# Patient Record
Sex: FEMALE | Race: WHITE | Employment: FULL TIME | URBAN - METROPOLITAN AREA
[De-identification: names, ages, dates, MRNs, and addresses within clinical notes are randomized per-mention and may not be internally consistent; named-entity substitution may affect disease eponyms.]

---

## 2017-01-20 ENCOUNTER — OFFICE VISIT (OUTPATIENT)
Dept: CARDIOLOGY CLINIC | Age: 62
End: 2017-01-20

## 2017-01-20 VITALS
HEART RATE: 60 BPM | SYSTOLIC BLOOD PRESSURE: 116 MMHG | WEIGHT: 169.4 LBS | DIASTOLIC BLOOD PRESSURE: 66 MMHG | BODY MASS INDEX: 34.21 KG/M2

## 2017-01-20 DIAGNOSIS — I10 ESSENTIAL HYPERTENSION, BENIGN: ICD-10-CM

## 2017-01-20 DIAGNOSIS — I42.9 CARDIOMYOPATHY (HCC): Primary | ICD-10-CM

## 2017-01-20 PROCEDURE — 99214 OFFICE O/P EST MOD 30 MIN: CPT | Performed by: INTERNAL MEDICINE

## 2017-07-06 RX ORDER — ATORVASTATIN CALCIUM 10 MG/1
TABLET, FILM COATED ORAL
Qty: 30 TABLET | Refills: 4 | Status: SHIPPED | OUTPATIENT
Start: 2017-07-06 | End: 2018-01-05 | Stop reason: SDUPTHER

## 2017-07-19 ENCOUNTER — OFFICE VISIT (OUTPATIENT)
Dept: CARDIOLOGY CLINIC | Age: 62
End: 2017-07-19

## 2017-07-19 VITALS
HEART RATE: 72 BPM | BODY MASS INDEX: 34.26 KG/M2 | SYSTOLIC BLOOD PRESSURE: 118 MMHG | WEIGHT: 169.6 LBS | DIASTOLIC BLOOD PRESSURE: 66 MMHG

## 2017-07-19 DIAGNOSIS — I73.9 CLAUDICATION (HCC): Primary | ICD-10-CM

## 2017-07-19 DIAGNOSIS — I42.8 NON-ISCHEMIC CARDIOMYOPATHY (HCC): ICD-10-CM

## 2017-07-19 DIAGNOSIS — R00.2 HEART PALPITATIONS: ICD-10-CM

## 2017-07-19 DIAGNOSIS — I10 ESSENTIAL HYPERTENSION, BENIGN: ICD-10-CM

## 2017-07-19 PROCEDURE — 99214 OFFICE O/P EST MOD 30 MIN: CPT | Performed by: NURSE PRACTITIONER

## 2017-07-28 ENCOUNTER — HOSPITAL ENCOUNTER (OUTPATIENT)
Dept: VASCULAR LAB | Age: 62
Discharge: HOME | End: 2017-07-28
Attending: NURSE PRACTITIONER | Admitting: NURSE PRACTITIONER

## 2017-07-28 DIAGNOSIS — I73.9 CLAUDICATION OF BOTH LOWER EXTREMITIES (HCC): Primary | ICD-10-CM

## 2017-08-02 ENCOUNTER — TELEPHONE (OUTPATIENT)
Dept: CARDIOLOGY CLINIC | Age: 62
End: 2017-08-02

## 2017-08-11 ENCOUNTER — TELEPHONE (OUTPATIENT)
Dept: CARDIOLOGY CLINIC | Age: 62
End: 2017-08-11

## 2017-10-21 ENCOUNTER — HOSPITAL ENCOUNTER (OUTPATIENT)
Dept: MAMMOGRAPHY | Age: 62
Discharge: HOME | End: 2017-10-21
Attending: FAMILY MEDICINE | Admitting: FAMILY MEDICINE

## 2017-10-21 DIAGNOSIS — Z12.31 VISIT FOR SCREENING MAMMOGRAM: ICD-10-CM

## 2017-11-16 ENCOUNTER — TELEPHONE (OUTPATIENT)
Dept: CARDIOLOGY CLINIC | Age: 62
End: 2017-11-16

## 2017-11-16 NOTE — TELEPHONE ENCOUNTER
Spoke to pt, she stated that she is having chest pain and feels nauseous. Pt denies SOB but does feel back pain. Instructed pt to go to the ER. Pt stated she would rather try OTC medication. Informed pt that OTC antiacid per Chayito Horton NP would not have an effect on her medications. Pt verbalized understanding on all instructions.

## 2018-01-05 RX ORDER — ATORVASTATIN CALCIUM 10 MG/1
TABLET, FILM COATED ORAL
Qty: 30 TABLET | Refills: 5 | Status: SHIPPED | OUTPATIENT
Start: 2018-01-05 | End: 2018-09-16 | Stop reason: SDUPTHER

## 2018-01-05 NOTE — TELEPHONE ENCOUNTER
Requested Prescriptions     Pending Prescriptions Disp Refills    atorvastatin (LIPITOR) 10 MG tablet [Pharmacy Med Name: ATORVASTATIN 10 MG TABLET] 30 tablet 5     Sig: TAKE ONE TABLET BY MOUTH DAILY         Last ov:7/19/17    Next ov:1/24/18    Last fill:7/6/17

## 2018-01-22 ENCOUNTER — TELEPHONE (OUTPATIENT)
Dept: CARDIOLOGY CLINIC | Age: 63
End: 2018-01-22

## 2018-01-25 ENCOUNTER — OFFICE VISIT (OUTPATIENT)
Dept: CARDIOLOGY CLINIC | Age: 63
End: 2018-01-25

## 2018-01-25 VITALS
SYSTOLIC BLOOD PRESSURE: 120 MMHG | DIASTOLIC BLOOD PRESSURE: 82 MMHG | WEIGHT: 169.4 LBS | HEART RATE: 56 BPM | BODY MASS INDEX: 34.21 KG/M2

## 2018-01-25 DIAGNOSIS — I73.9 CLAUDICATION OF BOTH LOWER EXTREMITIES (HCC): ICD-10-CM

## 2018-01-25 DIAGNOSIS — I10 ESSENTIAL HYPERTENSION, BENIGN: ICD-10-CM

## 2018-01-25 DIAGNOSIS — I42.8 NON-ISCHEMIC CARDIOMYOPATHY (HCC): Primary | ICD-10-CM

## 2018-01-25 PROCEDURE — 99214 OFFICE O/P EST MOD 30 MIN: CPT | Performed by: INTERNAL MEDICINE

## 2018-01-25 RX ORDER — PHENOL 1.4 %
1 AEROSOL, SPRAY (ML) MUCOUS MEMBRANE DAILY
COMMUNITY

## 2018-01-27 LAB
A/G RATIO: 1.9 (ref 1.1–2.2)
ALBUMIN SERPL-MCNC: 4.1 G/DL (ref 3.4–5)
ALP BLD-CCNC: 75 U/L (ref 40–129)
ALT SERPL-CCNC: 21 U/L (ref 10–40)
ANION GAP SERPL CALCULATED.3IONS-SCNC: 11 MMOL/L (ref 3–16)
AST SERPL-CCNC: 21 U/L (ref 15–37)
BASOPHILS ABSOLUTE: 0 K/UL (ref 0–0.2)
BASOPHILS RELATIVE PERCENT: 0.4 %
BILIRUB SERPL-MCNC: 0.7 MG/DL (ref 0–1)
BUN BLDV-MCNC: 23 MG/DL (ref 7–20)
CALCIUM SERPL-MCNC: 9.4 MG/DL (ref 8.3–10.6)
CHLORIDE BLD-SCNC: 100 MMOL/L (ref 99–110)
CHOLESTEROL, TOTAL: 162 MG/DL (ref 0–199)
CO2: 29 MMOL/L (ref 21–32)
CREAT SERPL-MCNC: 0.7 MG/DL (ref 0.6–1.2)
EOSINOPHILS ABSOLUTE: 0.1 K/UL (ref 0–0.6)
EOSINOPHILS RELATIVE PERCENT: 2 %
GFR AFRICAN AMERICAN: >60
GFR NON-AFRICAN AMERICAN: >60
GLOBULIN: 2.2 G/DL
GLUCOSE BLD-MCNC: 84 MG/DL (ref 70–99)
HCT VFR BLD CALC: 39.8 % (ref 36–48)
HDLC SERPL-MCNC: 62 MG/DL (ref 40–60)
HEMOGLOBIN: 13.7 G/DL (ref 12–16)
LDL CHOLESTEROL CALCULATED: 80 MG/DL
LYMPHOCYTES ABSOLUTE: 2 K/UL (ref 1–5.1)
LYMPHOCYTES RELATIVE PERCENT: 30.5 %
MCH RBC QN AUTO: 31.8 PG (ref 26–34)
MCHC RBC AUTO-ENTMCNC: 34.3 G/DL (ref 31–36)
MCV RBC AUTO: 92.7 FL (ref 80–100)
MONOCYTES ABSOLUTE: 0.5 K/UL (ref 0–1.3)
MONOCYTES RELATIVE PERCENT: 7.3 %
NEUTROPHILS ABSOLUTE: 3.8 K/UL (ref 1.7–7.7)
NEUTROPHILS RELATIVE PERCENT: 59.8 %
PDW BLD-RTO: 12.8 % (ref 12.4–15.4)
PLATELET # BLD: 196 K/UL (ref 135–450)
PMV BLD AUTO: 9.4 FL (ref 5–10.5)
POTASSIUM SERPL-SCNC: 4.5 MMOL/L (ref 3.5–5.1)
RBC # BLD: 4.3 M/UL (ref 4–5.2)
SODIUM BLD-SCNC: 140 MMOL/L (ref 136–145)
TOTAL PROTEIN: 6.3 G/DL (ref 6.4–8.2)
TRIGL SERPL-MCNC: 100 MG/DL (ref 0–150)
VLDLC SERPL CALC-MCNC: 20 MG/DL
WBC # BLD: 6.4 K/UL (ref 4–11)

## 2018-01-30 ENCOUNTER — TELEPHONE (OUTPATIENT)
Dept: CARDIOLOGY CLINIC | Age: 63
End: 2018-01-30

## 2018-02-01 ENCOUNTER — TELEPHONE (OUTPATIENT)
Dept: CARDIOLOGY CLINIC | Age: 63
End: 2018-02-01

## 2018-02-21 ENCOUNTER — HOSPITAL ENCOUNTER (OUTPATIENT)
Dept: NON INVASIVE DIAGNOSTICS | Age: 63
Discharge: HOME | End: 2018-01-31
Admitting: INTERNAL MEDICINE

## 2018-02-21 DIAGNOSIS — I42.8 OTHER CARDIOMYOPATHIES (CODE): ICD-10-CM

## 2018-02-21 DIAGNOSIS — I73.9 CLAUDICATION (HCC): Primary | ICD-10-CM

## 2018-03-08 ENCOUNTER — TELEPHONE (OUTPATIENT)
Dept: CARDIOLOGY CLINIC | Age: 63
End: 2018-03-08

## 2018-03-08 NOTE — TELEPHONE ENCOUNTER
[facility] scheduling calling to sched a ECHO and a Stress test for this pt    Appears to have missed several past tests    And OV on 3/1    Does she need to be seen again before those orders can be put in Epic?     Please call pt to notify

## 2018-03-22 NOTE — TELEPHONE ENCOUNTER
Requested Prescriptions     Pending Prescriptions Disp Refills    metoprolol tartrate (LOPRESSOR) 25 MG tablet [Pharmacy Med Name: METOPROLOL TARTRATE 25 MG TAB] 45 tablet 5     Sig: TAKE ONE TABLET BY MOUTH EVERY MORNING AND TAKE ONE-HALF TABLET BY MOUTH EVERY EVENING         Last ov:1/25/18    Next ov:none scheduled    Last fill:10/27/17

## 2018-05-18 ENCOUNTER — TELEPHONE (OUTPATIENT)
Dept: CARDIOLOGY CLINIC | Age: 63
End: 2018-05-18

## 2018-05-19 ENCOUNTER — HOSPITAL ENCOUNTER (OUTPATIENT)
Dept: NON INVASIVE DIAGNOSTICS | Age: 63
Discharge: HOME | End: 2018-05-19
Attending: INTERNAL MEDICINE | Admitting: INTERNAL MEDICINE

## 2018-05-19 DIAGNOSIS — I10 ESSENTIAL HYPERTENSION: ICD-10-CM

## 2018-05-19 DIAGNOSIS — I42.8 OTHER CARDIOMYOPATHIES (CODE): ICD-10-CM

## 2018-05-19 DIAGNOSIS — I42.8 NON-ISCHEMIC CARDIOMYOPATHY (HCC): ICD-10-CM

## 2018-05-19 LAB
LV EF: 75 %
LVEF MODALITY: NORMAL

## 2018-05-19 RX ORDER — SODIUM CHLORIDE 0.9 % (FLUSH) 0.9 %
10 SYRINGE (ML) INJECTION PRN
Status: DISCONTINUED | OUTPATIENT
Start: 2018-05-19 | End: 2018-05-20 | Stop reason: HOSPADM

## 2018-05-19 RX ADMIN — Medication 10 ML: at 08:59

## 2018-05-19 RX ADMIN — Medication 10 ML: at 11:49

## 2018-06-25 ENCOUNTER — OFFICE VISIT (OUTPATIENT)
Dept: INTERNAL MEDICINE | Age: 63
End: 2018-06-25

## 2018-06-25 VITALS
HEIGHT: 59 IN | SYSTOLIC BLOOD PRESSURE: 112 MMHG | WEIGHT: 171.4 LBS | OXYGEN SATURATION: 95 % | HEART RATE: 65 BPM | BODY MASS INDEX: 34.56 KG/M2 | TEMPERATURE: 99 F | RESPIRATION RATE: 16 BRPM | DIASTOLIC BLOOD PRESSURE: 74 MMHG

## 2018-06-25 DIAGNOSIS — I10 ESSENTIAL HYPERTENSION, BENIGN: ICD-10-CM

## 2018-06-25 DIAGNOSIS — F34.1 DYSTHYMIA: ICD-10-CM

## 2018-06-25 DIAGNOSIS — Z23 NEED FOR PROPHYLACTIC VACCINATION AGAINST STREPTOCOCCUS PNEUMONIAE (PNEUMOCOCCUS): ICD-10-CM

## 2018-06-25 DIAGNOSIS — Z00.00 ANNUAL PHYSICAL EXAM: Primary | ICD-10-CM

## 2018-06-25 PROCEDURE — 93000 ELECTROCARDIOGRAM COMPLETE: CPT | Performed by: NURSE PRACTITIONER

## 2018-06-25 PROCEDURE — 90670 PCV13 VACCINE IM: CPT | Performed by: NURSE PRACTITIONER

## 2018-06-25 PROCEDURE — 99387 INIT PM E/M NEW PAT 65+ YRS: CPT | Performed by: NURSE PRACTITIONER

## 2018-06-25 PROCEDURE — 90471 IMMUNIZATION ADMIN: CPT | Performed by: NURSE PRACTITIONER

## 2018-06-25 ASSESSMENT — ENCOUNTER SYMPTOMS
EYE PAIN: 0
SINUS PAIN: 0
HEMOPTYSIS: 0
DIARRHEA: 0
WHEEZING: 0
EYE REDNESS: 0
SORE THROAT: 0
ORTHOPNEA: 0
DOUBLE VISION: 0
STRIDOR: 0
NAUSEA: 0
BLURRED VISION: 0
VOMITING: 0
ABDOMINAL PAIN: 0
SPUTUM PRODUCTION: 0
BACK PAIN: 0
BLOOD IN STOOL: 0
HEARTBURN: 0
CONSTIPATION: 0
PHOTOPHOBIA: 0
SHORTNESS OF BREATH: 0
EYE DISCHARGE: 0
COUGH: 0

## 2018-06-25 ASSESSMENT — PATIENT HEALTH QUESTIONNAIRE - PHQ9
SUM OF ALL RESPONSES TO PHQ9 QUESTIONS 1 & 2: 0
1. LITTLE INTEREST OR PLEASURE IN DOING THINGS: 0
SUM OF ALL RESPONSES TO PHQ QUESTIONS 1-9: 0
2. FEELING DOWN, DEPRESSED OR HOPELESS: 0

## 2018-06-26 DIAGNOSIS — Z00.00 ANNUAL PHYSICAL EXAM: ICD-10-CM

## 2018-06-26 LAB
ANION GAP SERPL CALCULATED.3IONS-SCNC: 10 MMOL/L (ref 3–16)
BUN BLDV-MCNC: 23 MG/DL (ref 7–20)
CALCIUM SERPL-MCNC: 9.5 MG/DL (ref 8.3–10.6)
CHLORIDE BLD-SCNC: 104 MMOL/L (ref 99–110)
CHOLESTEROL, FASTING: 150 MG/DL (ref 0–199)
CO2: 29 MMOL/L (ref 21–32)
CREAT SERPL-MCNC: 0.7 MG/DL (ref 0.6–1.2)
ESTIMATED AVERAGE GLUCOSE: 114 MG/DL
GFR AFRICAN AMERICAN: >60
GFR NON-AFRICAN AMERICAN: >60
GLUCOSE BLD-MCNC: 94 MG/DL (ref 70–99)
HBA1C MFR BLD: 5.6 %
HCT VFR BLD CALC: 37.6 % (ref 36–48)
HDLC SERPL-MCNC: 58 MG/DL (ref 40–60)
HEMOGLOBIN: 13 G/DL (ref 12–16)
HEPATITIS C ANTIBODY INTERPRETATION: NORMAL
LDL CHOLESTEROL CALCULATED: 68 MG/DL
MCH RBC QN AUTO: 30.9 PG (ref 26–34)
MCHC RBC AUTO-ENTMCNC: 34.5 G/DL (ref 31–36)
MCV RBC AUTO: 89.6 FL (ref 80–100)
PDW BLD-RTO: 13.1 % (ref 12.4–15.4)
PLATELET # BLD: 175 K/UL (ref 135–450)
PMV BLD AUTO: 9.4 FL (ref 5–10.5)
POTASSIUM SERPL-SCNC: 5 MMOL/L (ref 3.5–5.1)
RBC # BLD: 4.19 M/UL (ref 4–5.2)
SODIUM BLD-SCNC: 143 MMOL/L (ref 136–145)
T4 FREE: 1.1 NG/DL (ref 0.9–1.8)
TRIGLYCERIDE, FASTING: 118 MG/DL (ref 0–150)
TSH REFLEX: 1.72 UIU/ML (ref 0.27–4.2)
VLDLC SERPL CALC-MCNC: 24 MG/DL
WBC # BLD: 7.4 K/UL (ref 4–11)

## 2018-06-27 LAB
HIV AG/AB: NORMAL
HIV ANTIGEN: NORMAL
HIV-1 ANTIBODY: NORMAL
HIV-2 AB: NORMAL

## 2018-06-28 LAB — VITAMIN D 1,25-DIHYDROXY: 36.3 PG/ML (ref 19.9–79.3)

## 2018-07-31 DIAGNOSIS — F32.A DEPRESSION, UNSPECIFIED DEPRESSION TYPE: ICD-10-CM

## 2018-07-31 RX ORDER — CITALOPRAM 20 MG/1
20 TABLET ORAL DAILY
Qty: 30 TABLET | Refills: 0 | Status: SHIPPED | OUTPATIENT
Start: 2018-07-31 | End: 2019-09-03 | Stop reason: SDUPTHER

## 2018-09-10 ENCOUNTER — OFFICE VISIT (OUTPATIENT)
Dept: INTERNAL MEDICINE | Age: 63
End: 2018-09-10

## 2018-09-10 VITALS
BODY MASS INDEX: 34.68 KG/M2 | HEART RATE: 75 BPM | OXYGEN SATURATION: 98 % | DIASTOLIC BLOOD PRESSURE: 60 MMHG | WEIGHT: 172 LBS | HEIGHT: 59 IN | SYSTOLIC BLOOD PRESSURE: 100 MMHG

## 2018-09-10 DIAGNOSIS — Z12.11 SCREENING FOR COLON CANCER: ICD-10-CM

## 2018-09-10 DIAGNOSIS — K21.9 GASTROESOPHAGEAL REFLUX DISEASE WITHOUT ESOPHAGITIS: ICD-10-CM

## 2018-09-10 DIAGNOSIS — J01.10 ACUTE NON-RECURRENT FRONTAL SINUSITIS: Primary | ICD-10-CM

## 2018-09-10 PROCEDURE — 99213 OFFICE O/P EST LOW 20 MIN: CPT | Performed by: NURSE PRACTITIONER

## 2018-09-10 ASSESSMENT — ENCOUNTER SYMPTOMS
EYE REDNESS: 0
SORE THROAT: 0
WHEEZING: 0
BLOOD IN STOOL: 0
EYE ITCHING: 0
RHINORRHEA: 1
BACK PAIN: 0
CHEST TIGHTNESS: 0
SINUS PAIN: 1
SHORTNESS OF BREATH: 0
NAUSEA: 0
ABDOMINAL PAIN: 0
CONSTIPATION: 0
SINUS PRESSURE: 1
VOMITING: 0
COUGH: 1
COLOR CHANGE: 0
DIARRHEA: 0

## 2018-09-10 ASSESSMENT — PATIENT HEALTH QUESTIONNAIRE - PHQ9
SUM OF ALL RESPONSES TO PHQ9 QUESTIONS 1 & 2: 0
2. FEELING DOWN, DEPRESSED OR HOPELESS: 0
SUM OF ALL RESPONSES TO PHQ QUESTIONS 1-9: 0
1. LITTLE INTEREST OR PLEASURE IN DOING THINGS: 0
SUM OF ALL RESPONSES TO PHQ QUESTIONS 1-9: 0

## 2018-09-10 NOTE — PROGRESS NOTES
9/10/2018     Tripp Velasquez (:  1955) is a 70 y.o. female, here for evaluation of the following medical concerns:    HPI  Patient comes to the office complaining of cough, congestion, drainage that has been present for the last several days. Her cough is productive of yellow sputum. Patient does not have associated fever or chills. Patient denies gastrointestinal symptoms related to Her present condition. Cough has made her throat hurt. She states that she has a lot of nasal congestion. She stayed home from work today. She has not been taking anything for symptoms. Symptoms started Saturday and are getting worse. Review of Systems   Constitutional: Positive for diaphoresis and fatigue. Negative for chills and fever. HENT: Positive for postnasal drip, rhinorrhea, sinus pain and sinus pressure. Negative for congestion, ear pain, nosebleeds, sneezing and sore throat. Eyes: Negative for redness and itching. Respiratory: Positive for cough. Negative for chest tightness, shortness of breath and wheezing. Cardiovascular: Negative for chest pain and palpitations. Gastrointestinal: Negative for abdominal pain, blood in stool, constipation, diarrhea, nausea and vomiting. Heartburn     Endocrine: Negative for cold intolerance and heat intolerance. Genitourinary: Negative for difficulty urinating, dysuria, flank pain, frequency, hematuria and urgency. Musculoskeletal: Negative for arthralgias, back pain, joint swelling and myalgias. Skin: Negative for color change, pallor, rash and wound. Allergic/Immunologic: Negative for environmental allergies and food allergies. Neurological: Negative for dizziness, seizures, syncope, weakness, light-headedness, numbness and headaches. Hematological: Negative for adenopathy. Does not bruise/bleed easily. Psychiatric/Behavioral: Negative for confusion, sleep disturbance and suicidal ideas.  The patient is not nervous/anxious and is not Ear: Hearing, tympanic membrane, external ear and ear canal normal.   Left Ear: Hearing, tympanic membrane, external ear and ear canal normal.   Nose: No mucosal edema or rhinorrhea. Right sinus exhibits no maxillary sinus tenderness and no frontal sinus tenderness. Left sinus exhibits no maxillary sinus tenderness and no frontal sinus tenderness. Mouth/Throat: No oropharyngeal exudate, posterior oropharyngeal edema, posterior oropharyngeal erythema or tonsillar abscesses. Cardiovascular: Normal rate, regular rhythm and normal heart sounds. Pulmonary/Chest: Effort normal and breath sounds normal.   Lymphadenopathy:        Head (right side): No submental, no submandibular, no tonsillar, no preauricular, no posterior auricular and no occipital adenopathy present. Head (left side): No submental, no submandibular, no tonsillar, no preauricular, no posterior auricular and no occipital adenopathy present. She has no cervical adenopathy. Skin: Skin is warm and dry. ASSESSMENT/PLAN:  1. Acute non-recurrent frontal sinusitis  - Continue with symptomatic management. Recommend increased water intake as well as saline irrigation, mucolytics, and antihistamines as needed. Advised to call back if no improvement over the next 4-7 days. - Antibiotic not indicated, discussed rationale of not giving antibiotics due to, but not limited to, side effects and build up of resistance. Advised if fever develops, symptoms worsen, or fail to improve with symptom management to return to office. 2. Gastroesophageal reflux disease without esophagitis  - prilosec otc  - tums as needed    3. Screening for colon cancer  - Elvira Macedo MD (Colonoscopy)      No Follow-up on file. An electronic signature was used to authenticate this note.     --HEATHER Anderson - CNP on 9/10/2018 at 3:17 PM

## 2018-09-11 ENCOUNTER — TELEPHONE (OUTPATIENT)
Dept: INTERNAL MEDICINE | Age: 63
End: 2018-09-11

## 2018-09-17 RX ORDER — ATORVASTATIN CALCIUM 10 MG/1
TABLET, FILM COATED ORAL
Qty: 90 TABLET | Refills: 0 | Status: SHIPPED | OUTPATIENT
Start: 2018-09-17 | End: 2019-02-08 | Stop reason: SDUPTHER

## 2018-09-17 NOTE — TELEPHONE ENCOUNTER
Requested Prescriptions     Pending Prescriptions Disp Refills    atorvastatin (LIPITOR) 10 MG tablet [Pharmacy Med Name: ATORVASTATIN 10 MG TABLET] 90 tablet 0     Sig: TAKE ONE TABLET BY MOUTH DAILY           Last ov:1/25/18    Next ov:none scheduled    Last fill:1/5/18

## 2019-01-02 ENCOUNTER — HOSPITAL ENCOUNTER (EMERGENCY)
Age: 64
Discharge: HOME | End: 2019-01-02
Payer: COMMERCIAL

## 2019-01-02 ENCOUNTER — APPOINTMENT (OUTPATIENT)
Dept: CT IMAGING | Age: 64
End: 2019-01-02
Payer: COMMERCIAL

## 2019-01-02 VITALS
BODY MASS INDEX: 32.25 KG/M2 | OXYGEN SATURATION: 98 % | RESPIRATION RATE: 16 BRPM | TEMPERATURE: 98.4 F | HEIGHT: 59 IN | HEART RATE: 70 BPM | WEIGHT: 160 LBS | SYSTOLIC BLOOD PRESSURE: 170 MMHG | DIASTOLIC BLOOD PRESSURE: 92 MMHG

## 2019-01-02 DIAGNOSIS — S00.83XA CONTUSION OF FACE, INITIAL ENCOUNTER: ICD-10-CM
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
## 2019-01-02 DIAGNOSIS — S60.229A CONTUSION OF HAND, UNSPECIFIED LATERALITY, INITIAL ENCOUNTER: ICD-10-CM

## 2019-01-02 DIAGNOSIS — S09.90XA INJURY OF HEAD, INITIAL ENCOUNTER: ICD-10-CM

## 2019-01-02 PROCEDURE — 99284 EMERGENCY DEPT VISIT MOD MDM: CPT

## 2019-01-02 PROCEDURE — 6370000000 HC RX 637 (ALT 250 FOR IP): Performed by: PHYSICIAN ASSISTANT

## 2019-01-02 PROCEDURE — 70450 CT HEAD/BRAIN W/O DYE: CPT

## 2019-01-02 RX ORDER — IBUPROFEN 800 MG/1
800 TABLET ORAL ONCE
Status: COMPLETED | OUTPATIENT
Start: 2019-01-02 | End: 2019-01-02

## 2019-01-02 RX ORDER — IBUPROFEN 800 MG/1
800 TABLET ORAL EVERY 8 HOURS PRN
Qty: 20 TABLET | Refills: 0 | Status: SHIPPED | OUTPATIENT
Start: 2019-01-02 | End: 2022-05-09

## 2019-01-02 RX ADMIN — IBUPROFEN 800 MG: 800 TABLET, FILM COATED ORAL at 20:31

## 2019-01-02 ASSESSMENT — PAIN DESCRIPTION - FREQUENCY: FREQUENCY: CONTINUOUS

## 2019-01-02 ASSESSMENT — PAIN DESCRIPTION - ONSET: ONSET: GRADUAL

## 2019-01-02 ASSESSMENT — PAIN SCALES - GENERAL
PAINLEVEL_OUTOF10: 7
PAINLEVEL_OUTOF10: 7

## 2019-01-02 ASSESSMENT — PAIN DESCRIPTION - ORIENTATION: ORIENTATION: RIGHT;LEFT

## 2019-01-02 ASSESSMENT — PAIN DESCRIPTION - PAIN TYPE: TYPE: ACUTE PAIN

## 2019-01-02 ASSESSMENT — PAIN DESCRIPTION - DESCRIPTORS: DESCRIPTORS: CONSTANT

## 2019-01-02 ASSESSMENT — PAIN DESCRIPTION - PROGRESSION: CLINICAL_PROGRESSION: GRADUALLY WORSENING

## 2019-01-03 ENCOUNTER — TELEPHONE (OUTPATIENT)
Dept: INTERNAL MEDICINE CLINIC | Age: 64
End: 2019-01-03

## 2019-01-04 ENCOUNTER — OFFICE VISIT (OUTPATIENT)
Dept: INTERNAL MEDICINE CLINIC | Age: 64
End: 2019-01-04
Payer: COMMERCIAL

## 2019-01-04 VITALS
SYSTOLIC BLOOD PRESSURE: 120 MMHG | HEART RATE: 61 BPM | HEIGHT: 59 IN | DIASTOLIC BLOOD PRESSURE: 76 MMHG | BODY MASS INDEX: 35.68 KG/M2 | WEIGHT: 177 LBS | OXYGEN SATURATION: 96 %
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
## 2019-01-04 DIAGNOSIS — S09.90XD TRAUMATIC INJURY OF HEAD, SUBSEQUENT ENCOUNTER: ICD-10-CM

## 2019-01-04 PROBLEM — S09.90XA HEAD TRAUMA: Status: ACTIVE | Noted: 2019-01-04

## 2019-01-04 PROCEDURE — 99214 OFFICE O/P EST MOD 30 MIN: CPT | Performed by: NURSE PRACTITIONER

## 2019-01-04 ASSESSMENT — ENCOUNTER SYMPTOMS
SHORTNESS OF BREATH: 0
WHEEZING: 0
SINUS PRESSURE: 0
BACK PAIN: 0
ABDOMINAL PAIN: 0
RHINORRHEA: 0
DIARRHEA: 0
EYE REDNESS: 0
VOMITING: 0
CONSTIPATION: 0
SORE THROAT: 0
COUGH: 0
EYE ITCHING: 0
CHEST TIGHTNESS: 0
BLOOD IN STOOL: 0
COLOR CHANGE: 0
NAUSEA: 0

## 2019-01-04 ASSESSMENT — PATIENT HEALTH QUESTIONNAIRE - PHQ9
1. LITTLE INTEREST OR PLEASURE IN DOING THINGS: 0
2. FEELING DOWN, DEPRESSED OR HOPELESS: 0
SUM OF ALL RESPONSES TO PHQ QUESTIONS 1-9: 0
SUM OF ALL RESPONSES TO PHQ QUESTIONS 1-9: 0
SUM OF ALL RESPONSES TO PHQ9 QUESTIONS 1 & 2: 0

## 2019-01-16 ENCOUNTER — TELEPHONE (OUTPATIENT)
Dept: INTERNAL MEDICINE CLINIC | Age: 64
End: 2019-01-16

## 2019-02-11 RX ORDER — ATORVASTATIN CALCIUM 10 MG/1
TABLET, FILM COATED ORAL
Qty: 7 TABLET | Refills: 0 | Status: SHIPPED | OUTPATIENT
Start: 2019-02-11 | End: 2019-03-06 | Stop reason: SDUPTHER

## 2019-02-19 RX ORDER — ATORVASTATIN CALCIUM 10 MG/1
TABLET, FILM COATED ORAL
Qty: 30 TABLET | Refills: 0 | Status: SHIPPED | OUTPATIENT
Start: 2019-02-19 | End: 2019-03-06 | Stop reason: SDUPTHER

## 2019-03-06 ENCOUNTER — OFFICE VISIT (OUTPATIENT)
Dept: CARDIOLOGY CLINIC | Age: 64
End: 2019-03-06
Payer: COMMERCIAL

## 2019-03-06 VITALS
DIASTOLIC BLOOD PRESSURE: 70 MMHG | HEIGHT: 59 IN | OXYGEN SATURATION: 97 % | HEART RATE: 61 BPM | SYSTOLIC BLOOD PRESSURE: 104 MMHG | WEIGHT: 178.2 LBS | BODY MASS INDEX: 35.92 KG/M2

## 2019-03-06 DIAGNOSIS — E78.2 MIXED HYPERLIPIDEMIA: ICD-10-CM

## 2019-03-06 DIAGNOSIS — I10 ESSENTIAL HYPERTENSION, BENIGN: ICD-10-CM

## 2019-03-06 DIAGNOSIS — I42.8 NON-ISCHEMIC CARDIOMYOPATHY (HCC): Primary | ICD-10-CM

## 2019-03-06 PROCEDURE — 99214 OFFICE O/P EST MOD 30 MIN: CPT | Performed by: NURSE PRACTITIONER

## 2019-03-06 RX ORDER — ATORVASTATIN CALCIUM 10 MG/1
TABLET, FILM COATED ORAL
Qty: 30 TABLET | Refills: 5 | Status: SHIPPED | OUTPATIENT
Start: 2019-03-06 | End: 2019-11-12 | Stop reason: SDUPTHER

## 2019-04-29 ENCOUNTER — TELEPHONE (OUTPATIENT)
Dept: INTERNAL MEDICINE CLINIC | Age: 64
End: 2019-04-29

## 2019-04-29 NOTE — TELEPHONE ENCOUNTER
Pt is asking for a handicap placard prescription  Hers expires in May  She would like to be contacted once this is done

## 2019-04-29 NOTE — LETTER
[facility] [address]  [address] [facility] [address]  Phone: [phone]  Fax: [phone]    HEATHER Tubbs CNP        April 29, 2019     Patient: Flory Lemus   YOB: 1955   Date of Visit: 4/29/2019       To Whom It May Concern: It is my medical opinion that Tanja Kevin requires a disability parking placard for the following reasons:  She cannot walk 200 feet without stopping to rest.  Duration of need: 6 years    If you have any questions or concerns, please don't hesitate to call.     Sincerely,        HEATHER Tubbs CNP

## 2019-05-01 ENCOUNTER — TELEPHONE (OUTPATIENT)
Dept: INTERNAL MEDICINE CLINIC | Age: 64
End: 2019-05-01

## 2019-05-06 ENCOUNTER — TELEPHONE (OUTPATIENT)
Dept: CARDIOLOGY CLINIC | Age: 64
End: 2019-05-06

## 2019-05-06 NOTE — TELEPHONE ENCOUNTER
Pt is looking for an anti inflammatory medication that is compatible with her cardiac meds for her plantars fascitis. Pt stated its so severe in the morning she can barely stand. Also, pt asking if yawning is a side effect from her antidepressant medication. Please call and advise. MARGARITA on [phone] as pt is currently at work.

## 2019-05-06 NOTE — TELEPHONE ENCOUNTER
Pt returned BANDAR Keane's call. This recorder advised pt of CG recommendations. Pt verbalized understanding and will f/u w/PCP.

## 2019-05-06 NOTE — TELEPHONE ENCOUNTER
She should follow up with her PCP. She should avoid NSAIDS but tylenol is ok. She should ask her PCP about the antidepressant.

## 2019-05-20 ENCOUNTER — HOSPITAL ENCOUNTER (OUTPATIENT)
Dept: MAMMOGRAPHY | Age: 64
Discharge: HOME | End: 2019-05-20
Payer: COMMERCIAL

## 2019-05-20 ENCOUNTER — OFFICE VISIT (OUTPATIENT)
Dept: INTERNAL MEDICINE CLINIC | Age: 64
End: 2019-05-20
Payer: COMMERCIAL

## 2019-05-20 VITALS
BODY MASS INDEX: 35.14 KG/M2 | HEART RATE: 67 BPM | WEIGHT: 174 LBS | DIASTOLIC BLOOD PRESSURE: 74 MMHG | SYSTOLIC BLOOD PRESSURE: 116 MMHG | OXYGEN SATURATION: 95 %

## 2019-05-20 DIAGNOSIS — M72.2 PLANTAR FASCIITIS: ICD-10-CM

## 2019-05-20 DIAGNOSIS — Z12.31 VISIT FOR SCREENING MAMMOGRAM: ICD-10-CM

## 2019-05-20 DIAGNOSIS — K63.5 POLYP OF COLON, UNSPECIFIED PART OF COLON, UNSPECIFIED TYPE: ICD-10-CM

## 2019-05-20 DIAGNOSIS — Z91.81 AT HIGH RISK FOR FALLS: ICD-10-CM

## 2019-05-20 DIAGNOSIS — Z12.11 COLON CANCER SCREENING: ICD-10-CM

## 2019-05-20 DIAGNOSIS — Z00.00 ROUTINE GENERAL MEDICAL EXAMINATION AT A HEALTH CARE FACILITY: Primary | ICD-10-CM
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
## 2019-05-20 LAB
CONTROL: NORMAL
HEMOCCULT STL QL: NORMAL

## 2019-05-20 PROCEDURE — 99397 PER PM REEVAL EST PAT 65+ YR: CPT | Performed by: NURSE PRACTITIONER

## 2019-05-20 PROCEDURE — 77067 SCR MAMMO BI INCL CAD: CPT

## 2019-05-20 PROCEDURE — 93000 ELECTROCARDIOGRAM COMPLETE: CPT | Performed by: NURSE PRACTITIONER

## 2019-05-20 PROCEDURE — 82274 ASSAY TEST FOR BLOOD FECAL: CPT | Performed by: NURSE PRACTITIONER

## 2019-05-20 ASSESSMENT — ENCOUNTER SYMPTOMS
CHEST TIGHTNESS: 0
EYE ITCHING: 0
SINUS PRESSURE: 0
COUGH: 0
BLOOD IN STOOL: 0
COLOR CHANGE: 0
EYE REDNESS: 0
SHORTNESS OF BREATH: 0
DIARRHEA: 0
NAUSEA: 0
RHINORRHEA: 0
SORE THROAT: 0
VOMITING: 0
WHEEZING: 0
CONSTIPATION: 0
ABDOMINAL PAIN: 0
BACK PAIN: 0

## 2019-05-20 ASSESSMENT — PATIENT HEALTH QUESTIONNAIRE - PHQ9
SUM OF ALL RESPONSES TO PHQ QUESTIONS 1-9: 0
1. LITTLE INTEREST OR PLEASURE IN DOING THINGS: 0
SUM OF ALL RESPONSES TO PHQ9 QUESTIONS 1 & 2: 0
SUM OF ALL RESPONSES TO PHQ QUESTIONS 1-9: 0
2. FEELING DOWN, DEPRESSED OR HOPELESS: 0

## 2019-05-20 NOTE — ASSESSMENT & PLAN NOTE
Multiple varicose veins and spider veins   Will refer to [facility] [place]   Suspect this is th cause of her pain

## 2019-05-21 ENCOUNTER — TELEPHONE (OUTPATIENT)
Dept: INTERNAL MEDICINE CLINIC | Age: 64
End: 2019-05-21

## 2019-05-21 DIAGNOSIS — Z00.00 ROUTINE GENERAL MEDICAL EXAMINATION AT A HEALTH CARE FACILITY: ICD-10-CM

## 2019-05-21 LAB
A/G RATIO: 1.8 (ref 1.1–2.2)
ALBUMIN SERPL-MCNC: 4.4 G/DL (ref 3.4–5)
ALP BLD-CCNC: 94 U/L (ref 40–129)
ALT SERPL-CCNC: 22 U/L (ref 10–40)
ANION GAP SERPL CALCULATED.3IONS-SCNC: 11 MMOL/L (ref 3–16)
AST SERPL-CCNC: 21 U/L (ref 15–37)
BILIRUB SERPL-MCNC: 0.4 MG/DL (ref 0–1)
BUN BLDV-MCNC: 18 MG/DL (ref 7–20)
CALCIUM SERPL-MCNC: 9.6 MG/DL (ref 8.3–10.6)
CHLORIDE BLD-SCNC: 103 MMOL/L (ref 99–110)
CHOLESTEROL, FASTING: 199 MG/DL (ref 0–199)
CO2: 27 MMOL/L (ref 21–32)
CREAT SERPL-MCNC: 0.8 MG/DL (ref 0.6–1.2)
GFR AFRICAN AMERICAN: >60
GFR NON-AFRICAN AMERICAN: >60
GLOBULIN: 2.5 G/DL
GLUCOSE BLD-MCNC: 100 MG/DL (ref 70–99)
HCT VFR BLD CALC: 41.1 % (ref 36–48)
HDLC SERPL-MCNC: 54 MG/DL (ref 40–60)
HEMOGLOBIN: 13.8 G/DL (ref 12–16)
LDL CHOLESTEROL CALCULATED: 108 MG/DL
MCH RBC QN AUTO: 30.5 PG (ref 26–34)
MCHC RBC AUTO-ENTMCNC: 33.5 G/DL (ref 31–36)
MCV RBC AUTO: 91.1 FL (ref 80–100)
PDW BLD-RTO: 13.1 % (ref 12.4–15.4)
PLATELET # BLD: 233 K/UL (ref 135–450)
PMV BLD AUTO: 9 FL (ref 5–10.5)
POTASSIUM SERPL-SCNC: 4.8 MMOL/L (ref 3.5–5.1)
RBC # BLD: 4.52 M/UL (ref 4–5.2)
SODIUM BLD-SCNC: 141 MMOL/L (ref 136–145)
TOTAL PROTEIN: 6.9 G/DL (ref 6.4–8.2)
TRIGLYCERIDE, FASTING: 184 MG/DL (ref 0–150)
TSH REFLEX: 1.47 UIU/ML (ref 0.27–4.2)
VITAMIN D 25-HYDROXY: 28.6 NG/ML
VLDLC SERPL CALC-MCNC: 37 MG/DL
WBC # BLD: 6.8 K/UL (ref 4–11)

## 2019-05-21 NOTE — TELEPHONE ENCOUNTER
An xray is not indicated for initial low back pain. Sciatic issues would not show on xray anyway. Recommendation is stretches, anti inflammatories, and heat. It will take time, but almost all back pain does improve. If she starts to have the inability to control bowels or bladder, or has unsteady gait or inability to walk, then can proceed with emergent imaging.

## 2019-05-22 LAB
ESTIMATED AVERAGE GLUCOSE: 119.8 MG/DL
HBA1C MFR BLD: 5.8 %

## 2019-06-19 PROBLEM — Z00.00 ROUTINE GENERAL MEDICAL EXAMINATION AT A HEALTH CARE FACILITY: Status: RESOLVED | Noted: 2019-05-20 | Resolved: 2019-06-19

## 2019-07-03 ENCOUNTER — HOSPITAL ENCOUNTER (OUTPATIENT)
Age: 64
Discharge: HOME | End: 2019-07-03
Payer: COMMERCIAL

## 2019-07-03 ENCOUNTER — TELEPHONE (OUTPATIENT)
Dept: INTERNAL MEDICINE CLINIC | Age: 64
End: 2019-07-03

## 2019-07-03 ENCOUNTER — OFFICE VISIT (OUTPATIENT)
Dept: INTERNAL MEDICINE CLINIC | Age: 64
End: 2019-07-03
Payer: COMMERCIAL

## 2019-07-03 ENCOUNTER — HOSPITAL ENCOUNTER (OUTPATIENT)
Dept: GENERAL RADIOLOGY | Age: 64
Discharge: HOME | End: 2019-07-03
Payer: COMMERCIAL

## 2019-07-03 VITALS
BODY MASS INDEX: 35.75 KG/M2 | SYSTOLIC BLOOD PRESSURE: 132 MMHG | DIASTOLIC BLOOD PRESSURE: 80 MMHG | WEIGHT: 177 LBS | HEART RATE: 64 BPM | OXYGEN SATURATION: 98 %

## 2019-07-03 DIAGNOSIS — M25.512 ACUTE PAIN OF LEFT SHOULDER: ICD-10-CM
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
## 2019-07-03 DIAGNOSIS — I10 ESSENTIAL HYPERTENSION, BENIGN: Primary | ICD-10-CM

## 2019-07-03 DIAGNOSIS — R29.6 FREQUENT FALLS: Primary | ICD-10-CM

## 2019-07-03 DIAGNOSIS — R29.6 FREQUENT FALLS: ICD-10-CM

## 2019-07-03 LAB
A/G RATIO: 1.5 (ref 1.1–2.2)
ALBUMIN SERPL-MCNC: 4.1 G/DL (ref 3.4–5)
ALP BLD-CCNC: 83 U/L (ref 40–129)
ALT SERPL-CCNC: 30 U/L (ref 10–40)
ANION GAP SERPL CALCULATED.3IONS-SCNC: 13 MMOL/L (ref 3–16)
AST SERPL-CCNC: 25 U/L (ref 15–37)
BILIRUB SERPL-MCNC: 0.4 MG/DL (ref 0–1)
BUN BLDV-MCNC: 25 MG/DL (ref 7–20)
CALCIUM SERPL-MCNC: 9.7 MG/DL (ref 8.3–10.6)
CHLORIDE BLD-SCNC: 102 MMOL/L (ref 99–110)
CO2: 26 MMOL/L (ref 21–32)
CREAT SERPL-MCNC: 0.7 MG/DL (ref 0.6–1.2)
GFR AFRICAN AMERICAN: >60
GFR NON-AFRICAN AMERICAN: >60
GLOBULIN: 2.8 G/DL
GLUCOSE BLD-MCNC: 58 MG/DL (ref 70–99)
POTASSIUM SERPL-SCNC: 4.1 MMOL/L (ref 3.5–5.1)
SODIUM BLD-SCNC: 141 MMOL/L (ref 136–145)
TOTAL PROTEIN: 6.9 G/DL (ref 6.4–8.2)

## 2019-07-03 PROCEDURE — 73030 X-RAY EXAM OF SHOULDER: CPT

## 2019-07-03 PROCEDURE — 99214 OFFICE O/P EST MOD 30 MIN: CPT | Performed by: NURSE PRACTITIONER

## 2019-07-03 ASSESSMENT — ENCOUNTER SYMPTOMS
SORE THROAT: 0
COLOR CHANGE: 1
SINUS PAIN: 0
CONSTIPATION: 0
EYE DISCHARGE: 0
EYE REDNESS: 0
BLOOD IN STOOL: 0
PHOTOPHOBIA: 0
COUGH: 0
EYE PAIN: 0
STRIDOR: 0
SHORTNESS OF BREATH: 0
VOMITING: 0
NAUSEA: 0
ABDOMINAL PAIN: 0
BACK PAIN: 0
WHEEZING: 0
DIARRHEA: 0

## 2019-07-03 ASSESSMENT — PATIENT HEALTH QUESTIONNAIRE - PHQ9
SUM OF ALL RESPONSES TO PHQ QUESTIONS 1-9: 0
SUM OF ALL RESPONSES TO PHQ9 QUESTIONS 1 & 2: 0
SUM OF ALL RESPONSES TO PHQ QUESTIONS 1-9: 0
2. FEELING DOWN, DEPRESSED OR HOPELESS: 0
1. LITTLE INTEREST OR PLEASURE IN DOING THINGS: 0

## 2019-07-03 NOTE — ASSESSMENT & PLAN NOTE
Frequent falls   At this time check mri of brain   PT ordered as well   Recommend getting rid of cats due to fall hazard, she does not want to do this.

## 2019-07-03 NOTE — PROGRESS NOTES
Subjective:      Patient ID: Celestina Heller is a 75 y.o. female. Chief Complaint   Patient presents with   Princess Velarde Fall     fell down some steps and hit her head on a brick wall, she fell on June 29th. she is also having right leg pain. She fell again today and injured her left arm       HPI   Jorge Luis Cisse is in the office today after repetitive falls. She states that a few weeks ago she tripped the steps over her daughter's cat, falling 3 steps and hitting her head on a brick wall. She states that she did not have any LOC, no dizziness. She does have pain to area of impact. She fell again today, landing on her left side, point of impact left upper extremity. She states that several weeks ago she also feel spontaneously at work, unwitnessed with no injury. She reports multiple falls before then as well. She states that her legs just feel week. Review of Systems   Constitutional: Negative for chills, diaphoresis and fever. HENT: Negative for congestion, ear discharge, ear pain, hearing loss, nosebleeds, sinus pain, sore throat and tinnitus. Eyes: Negative for photophobia, pain, discharge and redness. Respiratory: Negative for cough, shortness of breath, wheezing and stridor. Cardiovascular: Negative for chest pain, palpitations and leg swelling. Gastrointestinal: Negative for abdominal pain, blood in stool, constipation, diarrhea, nausea and vomiting. Endocrine: Negative for polydipsia. Genitourinary: Negative for dysuria, flank pain, frequency, hematuria and urgency. Musculoskeletal: Positive for arthralgias (left shoulder s/p fall). Negative for back pain, myalgias and neck pain. Skin: Positive for color change (bruising to left upper extremity ). Negative for rash. Allergic/Immunologic: Negative for environmental allergies. Neurological: Positive for headaches. Negative for dizziness, tremors, seizures and weakness. Hematological: Does not bruise/bleed easily.    Psychiatric/Behavioral: Negative

## 2019-07-03 NOTE — TELEPHONE ENCOUNTER
Pt is scheduled for brain MRI on July 16th. [facility] central scheduling calling because pt must have creatinine done prior to this test and results must be in system before the date of the test so it can be done.

## 2019-08-16 ENCOUNTER — TELEPHONE (OUTPATIENT)
Dept: SURGERY | Age: 64
End: 2019-08-16

## 2019-08-27 ENCOUNTER — APPOINTMENT (RX ONLY)
Dept: URBAN - METROPOLITAN AREA CLINIC 170 | Facility: CLINIC | Age: 64
Setting detail: DERMATOLOGY
End: 2019-08-27

## 2019-08-27 DIAGNOSIS — D22 MELANOCYTIC NEVI: ICD-10-CM

## 2019-08-27 DIAGNOSIS — L91.8 OTHER HYPERTROPHIC DISORDERS OF THE SKIN: ICD-10-CM

## 2019-08-27 DIAGNOSIS — L81.4 OTHER MELANIN HYPERPIGMENTATION: ICD-10-CM

## 2019-08-27 DIAGNOSIS — D18.0 HEMANGIOMA: ICD-10-CM

## 2019-08-27 DIAGNOSIS — L82.1 OTHER SEBORRHEIC KERATOSIS: ICD-10-CM

## 2019-08-27 PROBLEM — D22.5 MELANOCYTIC NEVI OF TRUNK: Status: ACTIVE | Noted: 2019-08-27

## 2019-08-27 PROBLEM — D18.01 HEMANGIOMA OF SKIN AND SUBCUTANEOUS TISSUE: Status: ACTIVE | Noted: 2019-08-27

## 2019-08-27 PROBLEM — M12.9 ARTHROPATHY, UNSPECIFIED: Status: ACTIVE | Noted: 2019-08-27

## 2019-08-27 PROBLEM — F32.9 MAJOR DEPRESSIVE DISORDER, SINGLE EPISODE, UNSPECIFIED: Status: ACTIVE | Noted: 2019-08-27

## 2019-08-27 PROBLEM — I10 ESSENTIAL (PRIMARY) HYPERTENSION: Status: ACTIVE | Noted: 2019-08-27

## 2019-08-27 PROCEDURE — ? INVENTORY

## 2019-08-27 PROCEDURE — ? COUNSELING

## 2019-08-27 PROCEDURE — 99202 OFFICE O/P NEW SF 15 MIN: CPT

## 2019-08-27 PROCEDURE — ? SUNSCREEN RECOMMENDATIONS

## 2019-08-27 ASSESSMENT — LOCATION SIMPLE DESCRIPTION DERM
LOCATION SIMPLE: RIGHT SHOULDER
LOCATION SIMPLE: LEFT ANTERIOR NECK
LOCATION SIMPLE: RIGHT LOWER BACK
LOCATION SIMPLE: ABDOMEN
LOCATION SIMPLE: UPPER BACK
LOCATION SIMPLE: RIGHT AXILLARY VAULT
LOCATION SIMPLE: RIGHT ANTERIOR NECK

## 2019-08-27 ASSESSMENT — LOCATION DETAILED DESCRIPTION DERM
LOCATION DETAILED: RIGHT CLAVICULAR NECK
LOCATION DETAILED: EPIGASTRIC SKIN
LOCATION DETAILED: INFERIOR THORACIC SPINE
LOCATION DETAILED: LEFT INFERIOR LATERAL NECK
LOCATION DETAILED: RIGHT INFERIOR MEDIAL MIDBACK
LOCATION DETAILED: RIGHT AXILLARY VAULT
LOCATION DETAILED: RIGHT ANTERIOR SHOULDER

## 2019-08-27 ASSESSMENT — LOCATION ZONE DERM
LOCATION ZONE: TRUNK
LOCATION ZONE: NECK
LOCATION ZONE: ARM
LOCATION ZONE: AXILLAE

## 2019-09-03 ENCOUNTER — TELEPHONE (OUTPATIENT)
Dept: INTERNAL MEDICINE CLINIC | Age: 64
End: 2019-09-03

## 2019-09-03 DIAGNOSIS — F32.A DEPRESSION, UNSPECIFIED DEPRESSION TYPE: ICD-10-CM

## 2019-09-03 RX ORDER — CITALOPRAM 20 MG/1
20 TABLET ORAL DAILY
Qty: 30 TABLET | Refills: 0 | Status: SHIPPED | OUTPATIENT
Start: 2019-09-03 | End: 2019-09-05 | Stop reason: SDUPTHER

## 2019-09-05 DIAGNOSIS — F32.A DEPRESSION, UNSPECIFIED DEPRESSION TYPE: ICD-10-CM

## 2019-09-05 RX ORDER — CITALOPRAM 20 MG/1
20 TABLET ORAL DAILY
Qty: 30 TABLET | Refills: 0 | Status: SHIPPED | OUTPATIENT
Start: 2019-09-05 | End: 2020-06-08

## 2019-10-22 ENCOUNTER — TELEPHONE (OUTPATIENT)
Dept: INTERNAL MEDICINE CLINIC | Age: 64
End: 2019-10-22

## 2019-10-22 RX ORDER — LISINOPRIL 2.5 MG/1
TABLET ORAL
Qty: 30 TABLET | Refills: 0 | Status: SHIPPED | OUTPATIENT
Start: 2019-10-22 | End: 2019-10-23 | Stop reason: SDUPTHER

## 2019-10-23 ENCOUNTER — TELEPHONE (OUTPATIENT)
Dept: INTERNAL MEDICINE CLINIC | Age: 64
End: 2019-10-23

## 2019-10-23 RX ORDER — LISINOPRIL 2.5 MG/1
TABLET ORAL
Qty: 30 TABLET | Refills: 0 | Status: SHIPPED | OUTPATIENT
Start: 2019-10-23 | End: 2019-11-18 | Stop reason: SDUPTHER

## 2019-10-24 ENCOUNTER — OFFICE VISIT (OUTPATIENT)
Dept: INTERNAL MEDICINE CLINIC | Age: 64
End: 2019-10-24
Payer: COMMERCIAL

## 2019-10-24 ENCOUNTER — HOSPITAL ENCOUNTER (OUTPATIENT)
Dept: GENERAL RADIOLOGY | Age: 64
Discharge: HOME | End: 2019-10-24
Payer: COMMERCIAL

## 2019-10-24 ENCOUNTER — HOSPITAL ENCOUNTER (OUTPATIENT)
Age: 64
Discharge: HOME | End: 2019-10-24
Payer: COMMERCIAL

## 2019-10-24 VITALS
HEART RATE: 70 BPM | DIASTOLIC BLOOD PRESSURE: 72 MMHG | BODY MASS INDEX: 35 KG/M2 | OXYGEN SATURATION: 98 % | SYSTOLIC BLOOD PRESSURE: 134 MMHG | WEIGHT: 173.6 LBS | HEIGHT: 59 IN

## 2019-10-24 DIAGNOSIS — G89.29 CHRONIC MIDLINE LOW BACK PAIN WITHOUT SCIATICA: ICD-10-CM

## 2019-10-24 DIAGNOSIS — M51.36 DDD (DEGENERATIVE DISC DISEASE), LUMBAR: ICD-10-CM

## 2019-10-24 DIAGNOSIS — M54.50 CHRONIC MIDLINE LOW BACK PAIN WITHOUT SCIATICA: ICD-10-CM

## 2019-10-24 DIAGNOSIS — Z78.0 ENCOUNTER FOR OSTEOPOROSIS SCREENING IN ASYMPTOMATIC POSTMENOPAUSAL PATIENT: ICD-10-CM

## 2019-10-24 DIAGNOSIS — M54.50 CHRONIC MIDLINE LOW BACK PAIN WITHOUT SCIATICA: Primary | ICD-10-CM

## 2019-10-24 DIAGNOSIS — G89.29 CHRONIC MIDLINE LOW BACK PAIN WITHOUT SCIATICA: Primary | ICD-10-CM

## 2019-10-24 DIAGNOSIS — I83.813 VARICOSE VEINS OF BOTH LOWER EXTREMITIES WITH PAIN: ICD-10-CM

## 2019-10-24 DIAGNOSIS — Z13.820 ENCOUNTER FOR OSTEOPOROSIS SCREENING IN ASYMPTOMATIC POSTMENOPAUSAL PATIENT: ICD-10-CM

## 2019-10-24 PROCEDURE — 90732 PPSV23 VACC 2 YRS+ SUBQ/IM: CPT | Performed by: NURSE PRACTITIONER

## 2019-10-24 PROCEDURE — 72100 X-RAY EXAM L-S SPINE 2/3 VWS: CPT

## 2019-10-24 PROCEDURE — 90471 IMMUNIZATION ADMIN: CPT | Performed by: NURSE PRACTITIONER

## 2019-10-24 PROCEDURE — 99214 OFFICE O/P EST MOD 30 MIN: CPT | Performed by: NURSE PRACTITIONER

## 2019-10-24 RX ORDER — METHYLPREDNISOLONE 4 MG/1
TABLET ORAL
Qty: 1 KIT | Refills: 0 | Status: SHIPPED | OUTPATIENT
Start: 2019-10-24 | End: 2020-08-10 | Stop reason: ALTCHOICE

## 2019-10-24 RX ORDER — BACLOFEN 10 MG/1
10 TABLET ORAL 3 TIMES DAILY
Qty: 21 TABLET | Refills: 0 | Status: SHIPPED | OUTPATIENT
Start: 2019-10-24 | End: 2019-10-27 | Stop reason: SDUPTHER

## 2019-10-24 ASSESSMENT — ENCOUNTER SYMPTOMS
EYE REDNESS: 0
ABDOMINAL PAIN: 0
COLOR CHANGE: 0
EYE ITCHING: 0
CONSTIPATION: 0
SHORTNESS OF BREATH: 0
CHEST TIGHTNESS: 0
DIARRHEA: 0
BLOOD IN STOOL: 0
WHEEZING: 0
SINUS PRESSURE: 0
SORE THROAT: 0
BACK PAIN: 1
VOMITING: 0
NAUSEA: 0
RHINORRHEA: 0
COUGH: 0

## 2019-10-24 ASSESSMENT — PATIENT HEALTH QUESTIONNAIRE - PHQ9
DEPRESSION UNABLE TO ASSESS: FUNCTIONAL CAPACITY MOTIVATION LIMITS ACCURACY
2. FEELING DOWN, DEPRESSED OR HOPELESS: 0
SUM OF ALL RESPONSES TO PHQ QUESTIONS 1-9: 0
1. LITTLE INTEREST OR PLEASURE IN DOING THINGS: 0
SUM OF ALL RESPONSES TO PHQ9 QUESTIONS 1 & 2: 0
SUM OF ALL RESPONSES TO PHQ QUESTIONS 1-9: 0

## 2019-10-28 RX ORDER — BACLOFEN 10 MG/1
TABLET ORAL
Qty: 21 TABLET | Refills: 0 | Status: SHIPPED | OUTPATIENT
Start: 2019-10-28 | End: 2022-05-09

## 2019-11-10 DIAGNOSIS — F32.A DEPRESSION, UNSPECIFIED DEPRESSION TYPE: ICD-10-CM

## 2019-11-11 RX ORDER — CITALOPRAM 20 MG/1
TABLET ORAL
Qty: 30 TABLET | Refills: 0 | Status: SHIPPED | OUTPATIENT
Start: 2019-11-11 | End: 2019-12-23

## 2019-11-18 RX ORDER — LISINOPRIL 2.5 MG/1
TABLET ORAL
Qty: 30 TABLET | Refills: 0 | Status: SHIPPED | OUTPATIENT
Start: 2019-11-18 | End: 2019-12-27

## 2019-11-21 ENCOUNTER — TELEPHONE (OUTPATIENT)
Dept: INTERNAL MEDICINE CLINIC | Age: 64
End: 2019-11-21

## 2019-11-25 RX ORDER — LISINOPRIL 2.5 MG/1
TABLET ORAL
Qty: 30 TABLET | Refills: 0 | Status: SHIPPED | OUTPATIENT
Start: 2019-11-25 | End: 2020-06-24

## 2019-12-06 ENCOUNTER — OFFICE VISIT (OUTPATIENT)
Dept: CARDIOLOGY CLINIC | Age: 64
End: 2019-12-06
Payer: COMMERCIAL

## 2019-12-06 VITALS
HEART RATE: 58 BPM | SYSTOLIC BLOOD PRESSURE: 122 MMHG | BODY MASS INDEX: 34.9 KG/M2 | WEIGHT: 172.8 LBS | DIASTOLIC BLOOD PRESSURE: 80 MMHG

## 2019-12-06 DIAGNOSIS — R00.2 PALPITATIONS: ICD-10-CM

## 2019-12-06 DIAGNOSIS — I10 ESSENTIAL HYPERTENSION, BENIGN: Primary | ICD-10-CM

## 2019-12-06 DIAGNOSIS — I42.8 NON-ISCHEMIC CARDIOMYOPATHY (HCC): ICD-10-CM

## 2019-12-06 PROCEDURE — 99213 OFFICE O/P EST LOW 20 MIN: CPT | Performed by: INTERNAL MEDICINE

## 2019-12-06 RX ORDER — RANITIDINE 150 MG/1
150 TABLET ORAL 2 TIMES DAILY
Qty: 180 TABLET | Refills: 3 | Status: SHIPPED | OUTPATIENT
Start: 2019-12-06 | End: 2020-08-10 | Stop reason: ALTCHOICE

## 2019-12-20 DIAGNOSIS — F32.A DEPRESSION, UNSPECIFIED DEPRESSION TYPE: ICD-10-CM

## 2019-12-23 RX ORDER — CITALOPRAM 20 MG/1
TABLET ORAL
Qty: 30 TABLET | Refills: 0 | Status: SHIPPED | OUTPATIENT
Start: 2019-12-23 | End: 2020-02-03

## 2019-12-27 RX ORDER — LISINOPRIL 2.5 MG/1
TABLET ORAL
Qty: 30 TABLET | Refills: 0 | Status: SHIPPED | OUTPATIENT
Start: 2019-12-27 | End: 2020-02-24

## 2020-02-03 RX ORDER — CITALOPRAM 20 MG/1
TABLET ORAL
Qty: 30 TABLET | Refills: 0 | Status: SHIPPED | OUTPATIENT
Start: 2020-02-03 | End: 2020-03-20

## 2020-02-24 RX ORDER — LISINOPRIL 2.5 MG/1
TABLET ORAL
Qty: 30 TABLET | Refills: 0 | Status: SHIPPED | OUTPATIENT
Start: 2020-02-24 | End: 2020-02-28

## 2020-02-28 RX ORDER — LISINOPRIL 2.5 MG/1
TABLET ORAL
Qty: 30 TABLET | Refills: 0 | Status: SHIPPED | OUTPATIENT
Start: 2020-02-28 | End: 2020-03-24

## 2020-03-20 RX ORDER — CITALOPRAM 20 MG/1
TABLET ORAL
Qty: 30 TABLET | Refills: 0 | Status: SHIPPED | OUTPATIENT
Start: 2020-03-20 | End: 2020-04-27

## 2020-03-24 RX ORDER — LISINOPRIL 2.5 MG/1
TABLET ORAL
Qty: 30 TABLET | Refills: 0 | Status: SHIPPED | OUTPATIENT
Start: 2020-03-24 | End: 2020-04-27

## 2020-04-27 RX ORDER — CITALOPRAM 20 MG/1
TABLET ORAL
Qty: 30 TABLET | Refills: 0 | Status: SHIPPED | OUTPATIENT
Start: 2020-04-27 | End: 2020-06-08

## 2020-04-27 RX ORDER — LISINOPRIL 2.5 MG/1
TABLET ORAL
Qty: 30 TABLET | Refills: 0 | Status: SHIPPED | OUTPATIENT
Start: 2020-04-27 | End: 2020-06-24

## 2020-05-04 ENCOUNTER — TELEPHONE (OUTPATIENT)
Dept: CARDIOLOGY CLINIC | Age: 65
End: 2020-05-04

## 2020-05-19 ENCOUNTER — TELEPHONE (OUTPATIENT)
Dept: INTERNAL MEDICINE CLINIC | Age: 65
End: 2020-05-19

## 2020-05-19 ENCOUNTER — VIRTUAL VISIT (OUTPATIENT)
Dept: FAMILY MEDICINE CLINIC | Age: 65
End: 2020-05-19
Payer: COMMERCIAL

## 2020-05-19 PROBLEM — M15.9 PRIMARY OSTEOARTHRITIS INVOLVING MULTIPLE JOINTS: Status: ACTIVE | Noted: 2020-05-19

## 2020-05-19 PROBLEM — M15.0 PRIMARY OSTEOARTHRITIS INVOLVING MULTIPLE JOINTS: Status: ACTIVE | Noted: 2020-05-19

## 2020-05-19 PROCEDURE — 99442 PR PHYS/QHP TELEPHONE EVALUATION 11-20 MIN: CPT | Performed by: NURSE PRACTITIONER

## 2020-05-19 NOTE — ASSESSMENT & PLAN NOTE
Obtain xray of right hip   Continue tylenol prn   Encouraged weight loss   Suggested ibuprofen for 3 days for shoulder pain/ inflammation   Call if symptoms worsen or fail to improve

## 2020-05-19 NOTE — PROGRESS NOTES
Lele Lisa is a 75 y.o. female evaluated via telephone on 5/19/2020. Consent:  She and/or health care decision maker is aware that that she may receive a bill for this telephone service, depending on her insurance coverage, and has provided verbal consent to proceed: Yes      Documentation:  I communicated with the patient and/or health care decision maker about right shoulder and hip pain. Details of this discussion including any medical advice provided: See below    HPI-   reports right shoulder pain that started yesterday. She has had chronic shoulder pain for some time but feels she overused it while working. She works as a  at a packaging plant. She noticed some improvement with tylenol but she did miss work today. Has full ROM. She also report right hip pain is worsening lately. She hears it \"pop\" a lot and has been having trouble going up and down stairs. She denies injury to the hip. Gets some improvement with tylenol. Pain is slowly worsening. Primary osteoarthritis involving multiple joints  Obtain xray of right hip   Continue tylenol prn   Encouraged weight loss   Suggested ibuprofen for 3 days for shoulder pain/ inflammation   Call if symptoms worsen or fail to improve          I affirm this is a Patient Initiated Episode with a Patient who has not had a related appointment within my department in the past 7 days or scheduled within the next 24 hours.     Patient identification was verified at the start of the visit: Yes    Total Time: minutes: 11-20 minutes    Note: not billable if this call serves to triage the patient into an appointment for the relevant concern      Candida Noel

## 2020-05-19 NOTE — LETTER
[facility] [address]  [address] [place] [address]  Phone: [phone]  Fax: [phone] CNP        05/20/20    Patient: Kavin Lpoez   YOB: 1955   Date of Visit: 5/19/20       To Whom it May Concern:    Kavin Lopez was seen in my clinic on 5/19/20. She should be excused from Work on 5/19/2020 and 05/20/20. If you have any questions or concerns, please don't hesitate to call.     Sincerely,         HEATHER Almanza - CNP

## 2020-05-19 NOTE — TELEPHONE ENCOUNTER
Pt calling because she had a phone visit with Jocelynn Barrett today and states she asked her for a work note but did not have the fax number at the time. .. Please fax work letter to [facility] employer at fax number [phone], attn: Dagoberto Pollock.

## 2020-05-22 ENCOUNTER — TELEPHONE (OUTPATIENT)
Dept: INTERNAL MEDICINE CLINIC | Age: 65
End: 2020-05-22

## 2020-05-22 DIAGNOSIS — M25.511 ACUTE PAIN OF RIGHT SHOULDER: Primary | ICD-10-CM

## 2020-06-08 RX ORDER — CITALOPRAM 20 MG/1
TABLET ORAL
Qty: 30 TABLET | Refills: 0 | Status: SHIPPED | OUTPATIENT
Start: 2020-06-08 | End: 2020-07-20

## 2020-06-24 RX ORDER — LISINOPRIL 2.5 MG/1
TABLET ORAL
Qty: 30 TABLET | Refills: 0 | Status: SHIPPED | OUTPATIENT
Start: 2020-06-24 | End: 2020-06-29

## 2020-06-29 ENCOUNTER — TELEPHONE (OUTPATIENT)
Dept: INTERNAL MEDICINE CLINIC | Age: 65
End: 2020-06-29

## 2020-06-29 RX ORDER — LISINOPRIL 2.5 MG/1
TABLET ORAL
Qty: 30 TABLET | Refills: 0 | Status: SHIPPED | OUTPATIENT
Start: 2020-06-29 | End: 2020-08-24

## 2020-07-01 ENCOUNTER — TELEPHONE (OUTPATIENT)
Dept: INTERNAL MEDICINE CLINIC | Age: 65
End: 2020-07-01

## 2020-07-01 NOTE — TELEPHONE ENCOUNTER
Pt would like to know who you recommend for dermatology - can you please place a referral.  Pt would like to have a yearly skin check.

## 2020-07-01 NOTE — TELEPHONE ENCOUNTER
[facility] - Alexander Mariscal, DO  [address] [facility], [address], [address] [facility]  Ph: [phone]    She could see anyone in the practice. Advise her that it will take some time to get in, just make an appointment and then once she is an established patient it is easier to get in.

## 2020-07-06 ASSESSMENT — PATIENT HEALTH QUESTIONNAIRE - PHQ9
SUM OF ALL RESPONSES TO PHQ QUESTIONS 1-9: 0
SUM OF ALL RESPONSES TO PHQ9 QUESTIONS 1 & 2: 0
SUM OF ALL RESPONSES TO PHQ QUESTIONS 1-9: 0
1. LITTLE INTEREST OR PLEASURE IN DOING THINGS: 0
2. FEELING DOWN, DEPRESSED OR HOPELESS: 0

## 2020-07-20 RX ORDER — CITALOPRAM 20 MG/1
TABLET ORAL
Qty: 30 TABLET | Refills: 0 | Status: SHIPPED | OUTPATIENT
Start: 2020-07-20 | End: 2020-08-10 | Stop reason: SDUPTHER

## 2020-08-05 ENCOUNTER — TELEPHONE (OUTPATIENT)
Dept: INTERNAL MEDICINE CLINIC | Age: 65
End: 2020-08-05

## 2020-08-05 NOTE — TELEPHONE ENCOUNTER
She needs to be seen for fatigue before FMLA can be filled out. Usually fatigue alone is not a diagnosis for FMLA and the cause needs to be identified.

## 2020-08-10 ENCOUNTER — OFFICE VISIT (OUTPATIENT)
Dept: INTERNAL MEDICINE CLINIC | Age: 65
End: 2020-08-10
Payer: COMMERCIAL

## 2020-08-10 VITALS
DIASTOLIC BLOOD PRESSURE: 82 MMHG | HEIGHT: 59 IN | TEMPERATURE: 97.7 F | WEIGHT: 175.2 LBS | HEART RATE: 85 BPM | OXYGEN SATURATION: 98 % | SYSTOLIC BLOOD PRESSURE: 127 MMHG | RESPIRATION RATE: 18 BRPM | BODY MASS INDEX: 35.32 KG/M2

## 2020-08-10 DIAGNOSIS — I10 ESSENTIAL HYPERTENSION, BENIGN: ICD-10-CM

## 2020-08-10 LAB
HCT VFR BLD CALC: 39.7 % (ref 36–48)
HEMOGLOBIN: 13.4 G/DL (ref 12–16)
MCH RBC QN AUTO: 30.9 PG (ref 26–34)
MCHC RBC AUTO-ENTMCNC: 33.8 G/DL (ref 31–36)
MCV RBC AUTO: 91.6 FL (ref 80–100)
PDW BLD-RTO: 13.3 % (ref 12.4–15.4)
PLATELET # BLD: 211 K/UL (ref 135–450)
PMV BLD AUTO: 8.8 FL (ref 5–10.5)
RBC # BLD: 4.33 M/UL (ref 4–5.2)
WBC # BLD: 8.7 K/UL (ref 4–11)

## 2020-08-10 PROCEDURE — 99214 OFFICE O/P EST MOD 30 MIN: CPT | Performed by: NURSE PRACTITIONER

## 2020-08-10 RX ORDER — PANTOPRAZOLE SODIUM 20 MG/1
20 TABLET, DELAYED RELEASE ORAL DAILY
Qty: 30 TABLET | Refills: 3 | Status: SHIPPED | OUTPATIENT
Start: 2020-08-10 | End: 2022-05-09 | Stop reason: ALTCHOICE

## 2020-08-10 RX ORDER — CITALOPRAM 40 MG/1
TABLET ORAL
Qty: 30 TABLET | Refills: 3 | Status: SHIPPED | OUTPATIENT
Start: 2020-08-10 | End: 2020-12-31

## 2020-08-10 NOTE — PROGRESS NOTES
Subjective:      Patient ID: Marnie Mckeon is a 77 y.o. female. Chief Complaint   Patient presents with    Leg Pain    Lower Back Pain    Hip Pain    Fatigue    Depression        HPI  Veronica Albrecht is in the office today with a several complaints   She states that she has been having increased pain in her lower back and legs. 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 She states that she is not taking anything for the pain. She works through it. She states that the pain sometimes radiates into her hip as well. She reports that she has been late to work a lot recently and this has actually reflected on her review this year and she was only given a 1% raise. She states that she never feels rested and is sleepy a lot during the day. She was told about sleep apnea in the past,  But never did follow up. All of this has made her depression worse and she feels that her current dose of celexa is not working. Past Medical History:   Diagnosis Date    Cardiomyopathy [facility]) 1/7/2014    Dx @ [facility].     Chronic back pain     Colon polyp     DDD (degenerative disc disease), lumbar 3/30/2015    Degenerative arthritis of cervical spine     Depression 12/16/2014    Essential hypertension, benign     Hyperlipidemia     Osteopenia     Varicose vein      Past Surgical History:   Procedure Laterality Date    COLONOSCOPY       Family History   Problem Relation Age of Onset    Colon Cancer Brother     Coronary Art Dis Other     Hypertension Other     Cancer Father         oral cancer    Other Other         PVOD     Social History     Socioeconomic History    Marital status:      Spouse name: Not on file    Number of children: Not on file    Years of education: Not on file    Highest education level: Not on file   Occupational History    Not on file   Social Needs    Financial resource strain: Not on file    Food insecurity     Worry: Not on file     Inability: Not on file    Transportation needs     Medical: Not on file     Non-medical: Not on file   Tobacco Use    Smoking status: Former Smoker     Packs/day: 0.50     Years: 41.00     Pack years: 20.50     Types: Cigarettes     Start date: 2013     Last attempt to quit: 2013     Years since quittin.9    Smokeless tobacco: Never Used   Substance and Sexual Activity    Alcohol use: No    Drug use: No    Sexual activity: Not Currently     Partners: Male     Birth control/protection: Condom   Lifestyle    Physical activity     Days per week: Not on file     Minutes per session: Not on file    Stress: Not on file   Relationships    Social connections     Talks on phone: Not on file     Gets together: Not on file     Attends Mandaeism service: Not on file     Active member of club or organization: Not on file     Attends meetings of clubs or organizations: Not on file     Relationship status: Not on file    Intimate partner violence     Fear of current or ex partner: Not on file     Emotionally abused: Not on file     Physically abused: Not on file     Forced sexual activity: Not on file   Other Topics Concern    Not on file   Social History Narrative    Not on file       Review of Systems   Constitutional: Negative for chills, fatigue and fever. HENT: Negative for congestion, ear pain, postnasal drip, rhinorrhea, sinus pressure, sneezing and sore throat. Eyes: Negative for redness and itching. Respiratory: Negative for cough, chest tightness, shortness of breath and wheezing. Cardiovascular: Negative for chest pain and palpitations. Gastrointestinal: Negative for abdominal pain, blood in stool, constipation, diarrhea, nausea and vomiting. Endocrine: Negative for cold intolerance and heat intolerance. Genitourinary: Negative for difficulty urinating, dysuria, flank pain, frequency, hematuria and urgency. Musculoskeletal: Positive for back pain.  Negative for arthralgias, joint swelling and myalgias. Skin: Negative for color change, pallor, rash and wound. Allergic/Immunologic: Negative for environmental allergies and food allergies. Neurological: Negative for dizziness, seizures, syncope, weakness, light-headedness, numbness and headaches. Hematological: Negative for adenopathy. Does not bruise/bleed easily. Psychiatric/Behavioral: Negative for confusion, sleep disturbance (day time sleepiness) and suicidal ideas. The patient is not nervous/anxious and is not hyperactive. Depression         Objective:     Vitals:    08/10/20 1628   BP: 127/82   Site: Right Upper Arm   Position: Sitting   Cuff Size: Medium Adult   Pulse: 85   Resp: 18   Temp: 97.7 °F (36.5 °C)   TempSrc: Temporal   SpO2: 98%   Weight: 175 lb 3.2 oz (79.5 kg)   Height: 4' 11\" (1.499 m)     Physical Exam  HENT:      Head: Normocephalic and atraumatic. Right Ear: External ear normal.      Left Ear: External ear normal.      Nose: Nose normal.   Neck:      Musculoskeletal: Normal range of motion and neck supple. Vascular: No JVD. Cardiovascular:      Rate and Rhythm: Normal rate and regular rhythm. Heart sounds: Normal heart sounds. No murmur. No friction rub. No gallop. Pulmonary:      Effort: Pulmonary effort is normal. No respiratory distress. Breath sounds: Normal breath sounds. No wheezing. Chest:      Chest wall: No tenderness. Abdominal:      General: Bowel sounds are normal. There is no distension. Palpations: Abdomen is soft. There is no mass. Tenderness: There is no abdominal tenderness. There is no guarding or rebound. Musculoskeletal:         General: No deformity. Lumbar back: She exhibits decreased range of motion, tenderness and pain (radiation down bilatera lower extremities as well as the right hip). She exhibits no bony tenderness, no swelling and no edema. Skin:     General: Skin is warm and dry. Findings: No erythema or rash.    Neurological: Mental Status: She is alert and oriented to person, place, and time. Psychiatric:         Attention and Perception: Attention and perception normal.         Mood and Affect: Mood is depressed (tearfull). Judgment: Judgment normal.       Current Outpatient Medications   Medication Sig Dispense Refill    pantoprazole (PROTONIX) 20 MG tablet Take 1 tablet by mouth daily 30 tablet 3    citalopram (CELEXA) 40 MG tablet TAKE ONE TABLET BY MOUTH DAILY 30 tablet 3    atorvastatin (LIPITOR) 10 MG tablet TAKE ONE TABLET BY MOUTH DAILY 90 tablet 1    lisinopril (PRINIVIL;ZESTRIL) 2.5 MG tablet TAKE ONE TABLET BY MOUTH DAILY 30 tablet 0    metoprolol tartrate (LOPRESSOR) 25 MG tablet 1 tab in am, and 1/2 tab in pm 135 tablet 3    baclofen (LIORESAL) 10 MG tablet TAKE ONE TABLET BY MOUTH THREE TIMES A DAY 21 tablet 0    ibuprofen (IBU) 800 MG tablet Take 1 tablet by mouth every 8 hours as needed for Pain 20 tablet 0    ibuprofen (ADVIL;MOTRIN) 100 MG/5ML suspension Take 4 tsp (20 mL po) q8h prn laryngitis and sore throat 300 mL 0    aspirin 81 MG chewable tablet Take 1 tablet by mouth daily. 30 tablet 2    Chlorphen-Phenyleph-APAP (CORICIDIN D COLD/FLU/SINUS) 2-5-325 MG TABS Take 2 tablets by mouth every 6 hours 168 tablet 0    calcium carbonate 600 MG TABS tablet Take 1 tablet by mouth daily       No current facility-administered medications for this visit. PHQ Scores 7/6/2020 10/24/2019 7/3/2019 5/20/2019 1/4/2019 9/10/2018 6/25/2018   PHQ2 Score 0 0 0 0 0 0 0   PHQ9 Score 0 0 0 0 0 0 0           :     1. Essential hypertension, benign    2. Depression, unspecified depression type    3. Snoring    4. DDD (degenerative disc disease), lumbar    5.  Ischemic cardiomyopathy      Plan:     Problem List     Essential hypertension, benign - Primary     Stable, controlled  No changes  Continue current treatment plan             Relevant Orders    [address] [facility]    CBC (Completed) Comprehensive Metabolic Panel (Completed)    TSH with Reflex (Completed)    IRON AND TIBC (Completed)    FERRITIN (Completed)    Cardiomyopathy (HCC)     Stable, controlled  No changes  Continue current treatment plan            Depression     Increase celexa to 60 mg   Follow  Up 1 month with judd sanchez   Referral to Dr. Juan Francisco Mari          Relevant Medications    citalopram (CELEXA) 40 MG tablet    DDD (degenerative disc disease), lumbar     She needs referral for PT   She was advised to do this in the past but did not follow through due to cost  Referral to [facility] and spine, evaluate for PT appropriateness          Relevant Medications    aspirin 81 MG chewable tablet    ibuprofen (ADVIL;MOTRIN) 100 MG/5ML suspension    ibuprofen (IBU) 800 MG tablet    baclofen (LIORESAL) 10 MG tablet             Discussed use, benefit, and side effects of all prescribed medications. Barriers to medication compliance addressed. All patient questions answered. Pt voiced understanding. All patientquestions answered. Pt voiced understanding.
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
## 2020-08-11 PROBLEM — S09.90XA HEAD TRAUMA: Status: RESOLVED | Noted: 2019-01-04 | Resolved: 2020-08-11

## 2020-08-11 PROBLEM — M25.512 ACUTE PAIN OF LEFT SHOULDER: Status: RESOLVED | Noted: 2019-07-03 | Resolved: 2020-08-11

## 2020-08-11 PROBLEM — M72.2 PLANTAR FASCIITIS: Status: RESOLVED | Noted: 2019-05-20 | Resolved: 2020-08-11

## 2020-08-11 LAB
A/G RATIO: 1.7 (ref 1.1–2.2)
ALBUMIN SERPL-MCNC: 4.4 G/DL (ref 3.4–5)
ALP BLD-CCNC: 91 U/L (ref 40–129)
ALT SERPL-CCNC: 18 U/L (ref 10–40)
ANION GAP SERPL CALCULATED.3IONS-SCNC: 10 MMOL/L (ref 3–16)
AST SERPL-CCNC: 20 U/L (ref 15–37)
BILIRUB SERPL-MCNC: 0.4 MG/DL (ref 0–1)
BUN BLDV-MCNC: 24 MG/DL (ref 7–20)
CALCIUM SERPL-MCNC: 10 MG/DL (ref 8.3–10.6)
CHLORIDE BLD-SCNC: 102 MMOL/L (ref 99–110)
CO2: 28 MMOL/L (ref 21–32)
CREAT SERPL-MCNC: 0.8 MG/DL (ref 0.6–1.2)
FERRITIN: 135.8 NG/ML (ref 15–150)
GFR AFRICAN AMERICAN: >60
GFR NON-AFRICAN AMERICAN: >60
GLOBULIN: 2.6 G/DL
GLUCOSE BLD-MCNC: 95 MG/DL (ref 70–99)
IRON SATURATION: 18 % (ref 15–50)
IRON: 60 UG/DL (ref 37–145)
POTASSIUM SERPL-SCNC: 4.9 MMOL/L (ref 3.5–5.1)
SODIUM BLD-SCNC: 140 MMOL/L (ref 136–145)
TOTAL IRON BINDING CAPACITY: 327 UG/DL (ref 260–445)
TOTAL PROTEIN: 7 G/DL (ref 6.4–8.2)
TSH REFLEX: 1.69 UIU/ML (ref 0.27–4.2)

## 2020-08-11 ASSESSMENT — PATIENT HEALTH QUESTIONNAIRE - PHQ9
SUM OF ALL RESPONSES TO PHQ9 QUESTIONS 1 & 2: 2
SUM OF ALL RESPONSES TO PHQ QUESTIONS 1-9: 2
2. FEELING DOWN, DEPRESSED OR HOPELESS: 2
SUM OF ALL RESPONSES TO PHQ QUESTIONS 1-9: 2
1. LITTLE INTEREST OR PLEASURE IN DOING THINGS: 0

## 2020-08-11 ASSESSMENT — ENCOUNTER SYMPTOMS
COLOR CHANGE: 0
VOMITING: 0
ABDOMINAL PAIN: 0
BLOOD IN STOOL: 0
DIARRHEA: 0
COUGH: 0
SINUS PRESSURE: 0
EYE REDNESS: 0
EYE ITCHING: 0
BACK PAIN: 1
WHEEZING: 0
CONSTIPATION: 0
SHORTNESS OF BREATH: 0
CHEST TIGHTNESS: 0
RHINORRHEA: 0
NAUSEA: 0
SORE THROAT: 0

## 2020-08-18 ENCOUNTER — TELEPHONE (OUTPATIENT)
Dept: INTERNAL MEDICINE CLINIC | Age: 65
End: 2020-08-18

## 2020-08-18 NOTE — TELEPHONE ENCOUNTER
----- Message from Sheldon Dotson sent at 8/18/2020 12:52 PM EDT -----  Subject: Message to Provider    QUESTIONS  Information for Provider? patient wants a call back with the referral   doctor information patient is at work says you can leave it on voice mail  ---------------------------------------------------------------------------  --------------  [address]  What is the best way for the office to contact you? OK to leave message on   voicemail  Preferred Call Back Phone Number? [phone]  ---------------------------------------------------------------------------  --------------  SCRIPT ANSWERS  Relationship to Patient?  Self

## 2020-08-24 RX ORDER — LISINOPRIL 2.5 MG/1
TABLET ORAL
Qty: 30 TABLET | Refills: 0 | Status: SHIPPED | OUTPATIENT
Start: 2020-08-24 | End: 2020-09-22

## 2020-08-26 ENCOUNTER — TELEPHONE (OUTPATIENT)
Dept: INTERNAL MEDICINE CLINIC | Age: 65
End: 2020-08-26

## 2020-08-26 NOTE — TELEPHONE ENCOUNTER
Pt calling to check status of FMLA form she says she brought into office at last visit earlier this month.

## 2020-09-09 ENCOUNTER — TELEPHONE (OUTPATIENT)
Dept: INTERNAL MEDICINE CLINIC | Age: 65
End: 2020-09-09

## 2020-09-09 NOTE — TELEPHONE ENCOUNTER
----- Message from [facility] sent at 9/9/2020 10:11 AM EDT -----  Subject: Message to Provider    QUESTIONS  Information for Provider? patient is wanting to know her results regarding   her pap smear   please advise   ---------------------------------------------------------------------------  --------------  CALL BACK INFO  What is the best way for the office to contact you? OK to leave message on   voicemail  Do not leave any message   patient will call back for answer  Preferred Call Back Phone Number? [phone]  ---------------------------------------------------------------------------  --------------  SCRIPT ANSWERS  Relationship to Patient?  Self

## 2020-09-22 RX ORDER — LISINOPRIL 2.5 MG/1
TABLET ORAL
Qty: 30 TABLET | Refills: 0 | Status: SHIPPED | OUTPATIENT
Start: 2020-09-22 | End: 2020-10-23

## 2020-09-25 ENCOUNTER — TELEPHONE (OUTPATIENT)
Dept: INTERNAL MEDICINE CLINIC | Age: 65
End: 2020-09-25

## 2020-09-25 NOTE — TELEPHONE ENCOUNTER
lmom no answer schedule vv Tuesday
chills   cough   shortness of breath   or difficulty breathing   or new loss of taste or smell? No  (Service Expert  click yes below to proceed with [employer] As Usual   Scheduling)?  Yes

## 2020-10-09 ENCOUNTER — HOSPITAL ENCOUNTER (OUTPATIENT)
Dept: GENERAL RADIOLOGY | Age: 65
Discharge: HOME | End: 2020-10-09
Payer: COMMERCIAL

## 2020-10-09 ENCOUNTER — APPOINTMENT (OUTPATIENT)
Dept: MAMMOGRAPHY | Age: 65
End: 2020-10-09
Payer: COMMERCIAL

## 2020-10-09 PROCEDURE — 77080 DXA BONE DENSITY AXIAL: CPT

## 2020-10-23 RX ORDER — LISINOPRIL 2.5 MG/1
TABLET ORAL
Qty: 30 TABLET | Refills: 0 | Status: SHIPPED | OUTPATIENT
Start: 2020-10-23 | End: 2020-11-02

## 2020-11-02 RX ORDER — LISINOPRIL 2.5 MG/1
TABLET ORAL
Qty: 30 TABLET | Refills: 0 | Status: SHIPPED | OUTPATIENT
Start: 2020-11-02 | End: 2020-12-31

## 2020-11-23 ENCOUNTER — NURSE TRIAGE (OUTPATIENT)
Dept: OTHER | Facility: CLINIC | Age: 65
End: 2020-11-23

## 2020-11-23 NOTE — TELEPHONE ENCOUNTER
Reason for Disposition   Patient wants to be seen    Answer Assessment - Initial Assessment Questions  1. MECHANISM: \"How did the injury happen? \" For falls, ask: \"What height did you fall from? \" and \"What surface did you fall against?\"       Catalina Cooleyker on right side and hit head   2. ONSET: \"When did the injury happen? \" (Minutes or hours ago)       1 week ago  3. NEUROLOGIC SYMPTOMS: \"Was there any loss of consciousness? \" \"Are there any other neurological symptoms? \"       no  4. MENTAL STATUS: \"Does the person know who he is, who you are, and where he is? \"       no  5. LOCATON: \"What part of the head was hit? \"       Right side of head   6. SCALP APPEARANCE: \"What does the scalp look like? Is it bleeding now? \" If so, ask: \"Is it difficult to stop? \"       Had a knot on right knee   7. SIZE: For cuts, bruises, or swelling, ask: \"How large is it? \" (e.g., inches or centimeters)       Right knee is   8. PAIN: \"Is there any pain? \" If so, ask: \"How bad is it? \"  (e.g., Scale 1-10; or mild, moderate, severe)      8/10 if in use   9. TETANUS: For any breaks in the skin, ask: \"When was the last tetanus booster? \"      *no  10. OTHER SYMPTOMS: \"Do you have any other symptoms? \" (e.g., neck pain, vomiting)        no  11. PREGNANCY: \"Is there any chance you are pregnant? \" \"When was your last menstrual period? \"        no    Protocols used: HEAD INJURY-ADULT-[place]    Patient called pre-service center [facility]) [place] with red flag complaint. Brief description of triage: pt reports having fallen and hit her head 1 week ago - no neurological defects noted - pt complaint of right knee pain that is 8/10 and is worried about. Triage indicates for patient to see pcp today     Care advice provided, patient verbalizes understanding; denies any other questions or concerns; instructed to call back for any new or worsening symptoms. Writer provided warm transfer to [facility] for appointment scheduling.     Attention Provider: Thank you for allowing me to participate in the care of your patient. The patient was connected to triage in response to information provided to the ECC. Please do not respond through this encounter as the response is not directed to a shared pool.

## 2020-11-24 ENCOUNTER — HOSPITAL ENCOUNTER (OUTPATIENT)
Dept: GENERAL RADIOLOGY | Age: 65
Discharge: HOME | End: 2020-11-24
Payer: COMMERCIAL

## 2020-11-24 ENCOUNTER — HOSPITAL ENCOUNTER (OUTPATIENT)
Age: 65
Discharge: HOME | End: 2020-11-24
Payer: COMMERCIAL

## 2020-11-24 ENCOUNTER — VIRTUAL VISIT (OUTPATIENT)
Dept: INTERNAL MEDICINE CLINIC | Age: 65
End: 2020-11-24
Payer: COMMERCIAL

## 2020-11-24 ENCOUNTER — NURSE TRIAGE (OUTPATIENT)
Dept: OTHER | Facility: CLINIC | Age: 65
End: 2020-11-24
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
## 2020-11-24 PROCEDURE — 73562 X-RAY EXAM OF KNEE 3: CPT

## 2020-11-24 PROCEDURE — 99442 PR PHYS/QHP TELEPHONE EVALUATION 11-20 MIN: CPT | Performed by: NURSE PRACTITIONER

## 2020-11-24 PROCEDURE — 73552 X-RAY EXAM OF FEMUR 2/>: CPT

## 2020-11-24 PROCEDURE — 73030 X-RAY EXAM OF SHOULDER: CPT

## 2020-11-24 PROCEDURE — 73502 X-RAY EXAM HIP UNI 2-3 VIEWS: CPT

## 2020-11-24 ASSESSMENT — ENCOUNTER SYMPTOMS
SINUS PRESSURE: 0
ABDOMINAL PAIN: 0
SHORTNESS OF BREATH: 0
SORE THROAT: 0
RHINORRHEA: 0
CHEST TIGHTNESS: 0
EYE ITCHING: 0
WHEEZING: 0
BLOOD IN STOOL: 0
COUGH: 0
COLOR CHANGE: 0
BACK PAIN: 0
DIARRHEA: 0
NAUSEA: 0
CONSTIPATION: 0
VOMITING: 0
EYE REDNESS: 0

## 2020-11-24 ASSESSMENT — PATIENT HEALTH QUESTIONNAIRE - PHQ9
SUM OF ALL RESPONSES TO PHQ9 QUESTIONS 1 & 2: 0
SUM OF ALL RESPONSES TO PHQ QUESTIONS 1-9: 0
1. LITTLE INTEREST OR PLEASURE IN DOING THINGS: 0
SUM OF ALL RESPONSES TO PHQ QUESTIONS 1-9: 0
SUM OF ALL RESPONSES TO PHQ QUESTIONS 1-9: 0
2. FEELING DOWN, DEPRESSED OR HOPELESS: 0

## 2020-11-24 NOTE — TELEPHONE ENCOUNTER
Call dropped during transfer from [facility]. Number had been verified by [facility]. Kelly Roche called patient at verified number and left message for her to call her PCPs office. Kelly Roche also called 3 other numbers listed on account without success. Reason for Disposition   Message left on unidentified voice mail. Phone number verified.     Protocols used: NO CONTACT OR DUPLICATE CONTACT CALL-ADULT-[place]

## 2020-11-24 NOTE — PROGRESS NOTES
2020    TELEHEALTH EVALUATION -- Audio/Visual (During MRPRK-08 public health emergency)    HPI:    Liliana Dowell (:  1955) has requested an audio/video evaluation for the following concern(s):    No chief complaint on file. Tomás Lynn reports that she fell about 2 weeks ago landing on her right side hip. She states that she had a huge bruise on the hip. She states that she hit her head on the side of the tub. She states that she did not LOC, no vision change. She states that she now has rippling sensation. When she tries to put her right leg forward. Pain is 8/9 out of 10 when she raises her leg and forward. She also has varicose veins that she is needling to get repaired and this is making pain worse. She fatigue when she ambulates far [facility] [place] wants to do RFA, but she cannot afford this at at time because her insurance will not cover it. She is not on any blood thinners, and she has not taken anything for the pain. She is unsure of any possible fracture, but the pain is rather severe. Review of Systems   Constitutional: Negative for chills, fatigue and fever. HENT: Negative for congestion, ear pain, postnasal drip, rhinorrhea, sinus pressure, sneezing and sore throat. Eyes: Negative for redness and itching. Respiratory: Negative for cough, chest tightness, shortness of breath and wheezing. Cardiovascular: Negative for chest pain and palpitations. Gastrointestinal: Negative for abdominal pain, blood in stool, constipation, diarrhea, nausea and vomiting. Endocrine: Negative for cold intolerance and heat intolerance. Genitourinary: Negative for difficulty urinating, dysuria, flank pain, frequency, hematuria and urgency. Musculoskeletal: Positive for arthralgias (right shoulder, hip, femur, right knee pain). Negative for back pain, joint swelling and myalgias. Skin: Negative for color change, pallor, rash and wound.    Allergic/Immunologic: Negative for environmental allergies and food allergies. Neurological: Negative for dizziness, seizures, syncope, weakness, light-headedness, numbness and headaches. Hematological: Negative for adenopathy. Does not bruise/bleed easily. Psychiatric/Behavioral: Negative for confusion, sleep disturbance and suicidal ideas. The patient is not nervous/anxious and is not hyperactive. Prior to Visit Medications    Medication Sig Taking? Authorizing Provider   lisinopril (PRINIVIL;ZESTRIL) 2.5 MG tablet TAKE ONE TABLET BY MOUTH DAILY  HEATHER Shultz CNP   pantoprazole (PROTONIX) 20 MG tablet Take 1 tablet by mouth daily  HEATHER Shultz CNP   citalopram (CELEXA) 40 MG tablet TAKE ONE TABLET BY MOUTH DAILY  HEATHER Shultz CNP   atorvastatin (LIPITOR) 10 MG tablet TAKE ONE TABLET BY MOUTH DAILY  HEATHER Vo CNP   metoprolol tartrate (LOPRESSOR) 25 MG tablet 1 tab in am, and 1/2 tab in pm  Olaf Capellan MD   baclofen (LIORESAL) 10 MG tablet TAKE ONE TABLET BY MOUTH THREE TIMES A DAY  HEATHER Preciado CNP   ibuprofen (IBU) 800 MG tablet Take 1 tablet by mouth every 8 hours as needed for Pain  Srini Jernigan PA-C   Chlorphen-Phenyleph-APAP (CORICIDIN D COLD/FLU/SINUS) 2-5-325 MG TABS Take 2 tablets by mouth every 6 hours  HEATHER Shultz CNP   calcium carbonate 600 MG TABS tablet Take 1 tablet by mouth daily  Historical Provider, MD   ibuprofen (ADVIL;MOTRIN) 100 MG/5ML suspension Take 4 tsp (20 mL po) q8h prn laryngitis and sore throat  Dontrell Edouard DO   aspirin 81 MG chewable tablet Take 1 tablet by mouth daily. Ashok Knight       Past Medical History:   Diagnosis Date    Cardiomyopathy [facility]) 1/7/2014    Dx @ [place].     Chronic back pain     Colon polyp     DDD (degenerative disc disease), lumbar 3/30/2015    Degenerative arthritis of cervical spine     Depression 12/16/2014    Essential hypertension, benign     Hyperlipidemia     Osteopenia     Varicose vein       Past Surgical History:   Procedure Laterality Date    COLONOSCOPY        Relationships   Social connections    Talks on phone: Not on file    Gets together: Not on file    Attends Mosque service: Not on file    Active member of club or organization: Not on file    Attends meetings of clubs or organizations: Not on file    Relationship status: Not on file     Family History   Problem Relation Age of Onset    Colon Cancer Brother     Coronary Art Dis Other     Hypertension Other     Cancer Father         oral cancer    Other Other         PVOD         No flowsheet data found. PHYSICAL EXAMINATION:  Physical Exam- telephone    ASSESSMENT/PLAN:  Problem List     Fall - Primary     Fall 2 weeks ago  Check imaging, there is concern for fracture  She is unable to get imaging done today because of work and making sure she gets holiday pay  She is not on any blood thinners  She did hit head, but no LOC and no confusion for visual changes. Relevant Orders    XR FEMUR RIGHT (MIN 2 VIEWS)    XR HIP RIGHT (2-3 VIEWS)    XR KNEE RIGHT (3 VIEWS)    XR SHOULDER RIGHT (MIN 2 VIEWS)            No follow-ups on file. Mumtaz Giron is a 77 y.o. female being evaluated by a Virtual Visit (video visit) encounter to address concerns as mentioned above. A caregiver was present when appropriate. Due to this being a TeleHealth encounter (During EKBGX-78 public health emergency), evaluation of the following organ systems was limited: Vitals/Constitutional/EENT/Resp/CV/GI//MS/Neuro/Skin/Heme-Lymph-Imm.   Pursuant to the emergency declaration under the [address] [place] [address] authority and the [employer] General Act, this Virtual Visit was conducted with patient's (and/or legal guardian's) consent, to reduce the patient's risk of exposure to COVID-19 and provide necessary medical care. The patient (and/or legal guardian) has also been advised to contact this office for worsening conditions or problems, and seek emergency medical treatment and/or call 911 if deemed necessary. Patient identification was verified at the start of the visit: Yes    Total time spent on this encounter: 20 minutes    Services were provided through a video synchronous discussion virtually to substitute for in-person clinic visit. Patient and provider were located at their individual homes. --HEATHER Turcios CNP on 11/24/2020 at 3:13 PM    An electronic signature was used to authenticate this note.

## 2020-11-24 NOTE — LETTER
[address]  [address]  [address] [facility] [place] [facility] [address]  Phone: [phone]  Fax: [phone]    Sheron President, HEATHER Navarro CNP        November 24, 2020     Patient: Ashlie Mccoy   YOB: 1955   Date of Visit: 11/24/2020       To Whom It May Concern: It is my medical opinion that Eleonora Perez needs to leave early from work on 11/25/2020 to get xrays. .    If you have any questions or concerns, please don't hesitate to call.     Sincerely,          Sheron President, HEATHER - CNP

## 2020-11-24 NOTE — ASSESSMENT & PLAN NOTE
Fall 2 weeks ago  Check imaging, there is concern for fracture  She is unable to get imaging done today because of work and making sure she gets holiday pay  She is not on any blood thinners  She did hit head, but no LOC and no confusion for visual changes.

## 2020-11-25 ENCOUNTER — TELEPHONE (OUTPATIENT)
Dept: INTERNAL MEDICINE CLINIC | Age: 65
End: 2020-11-25

## 2020-11-25 NOTE — TELEPHONE ENCOUNTER
Pt given shoulder x ray results  She takes extra strength Tylenol, not nsaids  She would like to be contacted regarding her leg x rays  Is there any arthritis?

## 2020-11-25 NOTE — TELEPHONE ENCOUNTER
Pt called and advised   Patient is wondering why her knee still hurts and why it hurts to touch it and on her thigh hurts to touch to. Pt was Mary to call her directly.

## 2020-11-25 NOTE — TELEPHONE ENCOUNTER
Pt called and advised   Patient wants to know where is the arthritis ?  Is it in her shoulder,knee,hip,leg

## 2020-11-25 NOTE — TELEPHONE ENCOUNTER
The images did not show the arthritis in her joints. There was evidence of osteopenia on her last dexa scan which would put her as preosteoporosis, but no osteoarthritis. This does not mean entirely that there is not some wear and tear to the joints though.  She had images I the past that showed arthritis in her spine

## 2020-12-01 ENCOUNTER — TELEPHONE (OUTPATIENT)
Dept: INTERNAL MEDICINE CLINIC | Age: 65
End: 2020-12-01

## 2020-12-01 NOTE — TELEPHONE ENCOUNTER
Desiree need xray orders for the pt's rt shoulder, rt knee, rt femur, and  rt hip and pelvis  The orders in Epic were transcribed from verbal orders The new orders need the dx, date(no later than 11/24), time, and signature

## 2020-12-14 ENCOUNTER — OFFICE VISIT (OUTPATIENT)
Dept: ORTHOPEDIC SURGERY | Age: 65
End: 2020-12-14
Payer: COMMERCIAL

## 2020-12-14 VITALS — BODY MASS INDEX: 35.28 KG/M2 | HEIGHT: 59 IN | WEIGHT: 175 LBS

## 2020-12-14 PROCEDURE — 99202 OFFICE O/P NEW SF 15 MIN: CPT | Performed by: PHYSICIAN ASSISTANT

## 2020-12-14 NOTE — PROGRESS NOTES
Subjective:      Patient ID: Kenia Mares is a 77 y.o. female. Chief Complaint   Patient presents with    Pain     Right thigh - tripped over rug and landed on right side. HPI:   She is here in the [address]   for an initial evaluation of a new problem. Lateral hip pain. She states back in November she fell landing on her right hip after tripping over a rug. She is had persistent right lateral hip pain since her injury. Pain Scale 7/10 VAS. Location of pain right lateral hip. Pain is worse with activity. Pain improves with rest.   Previous treatments have included ice and Tylenol with no relief. She has a history of degenerative disc disease and a disc herniation. Review Of Systems:   A 14 point review of systems and history form completed by the patient has been reviewed. This scanned in the media tab of the patient's chart under today's date. As outlined in the HPI. Negative for fever or chills. Positive for joint pain, swelling and stiffness. Negative for numbness or tingling. Past Medical History:   Diagnosis Date    Cardiomyopathy [facility]) 1/7/2014    Dx @ [place].     Chronic back pain     Colon polyp     DDD (degenerative disc disease), lumbar 3/30/2015    Degenerative arthritis of cervical spine     Depression 12/16/2014    Essential hypertension, benign     Hyperlipidemia     Osteopenia     Varicose vein        Family History   Problem Relation Age of Onset    Colon Cancer Brother     Coronary Art Dis Other     Hypertension Other     Cancer Father         oral cancer    Other Other         PVOD       Past Surgical History:   Procedure Laterality Date    COLONOSCOPY         Social History     Occupational History    Not on file   Tobacco Use    Smoking status: Former Smoker     Packs/day: 0.50     Years: 41.00     Pack years: 20.50     Types: Cigarettes     Start date: 8/12/2013     Quit date: 8/25/2013     Years (Nml 120-135 degrees)  -Extension 10                  (Nml 20-30 degrees)  -Abduction 30                  (Nml 40-50 degrees)  -Internal rotation 20         (Nml 30 degrees)  -External rotation 40        (Nml 50 degrees)    FADIR test (labral tear or NATHALIA) negative. LAKSHMI test ( aka Trevor\"s test) negative. Stinchfeld resisted hip flexion test negative. Yuki's test positive. Log roll test negative. Gait ( Nml, Antalgic, Trendelenberg)-mildly antalgic. Examination of the lower extremities are intact with sensation to light touch. Motor testing  5/5 in all major motor groups of the lower extremities. Negative Jain's Sign. SLR negative. Examination of the lower extremities shows intact perfusion to all extremities. No cyanosis. Digits are warm to touch, capillary refill is less than 2 seconds. There is no edema noted. Examination of the skin over both lower extremities reveals: The skin to be intact without lacerations or abrasions. No significant erythema. No rashes or skin lesions. X Rays: performed in the office today:   AP Pelvis and Frog Leg Lateral  Right Hip:    The alignment is anatomic. There are radiographic findings to suggest minimal degenerative changes of the right hip. There are no radiographic findings to suggest fracture or dislocation. Additional Tests reviewed: none  Additional Outside Records reviewed: none    Diagnosis:       ICD-10-CM    1. Right hip pain  M25.551 XR HIP RIGHT (2-3 VIEWS)     OSR PT - [facility]   2. Contusion of right hip, initial encounter  S70.01XA OSR PT - [facility]        Assessment and Plan:       Right hip contusion. Injury occurred 11/10/2020. The natural history of the patient's diagnosis as well as the treatment options were discussed in full and questions were answered. Risks and benefits of the treatment options also reviewed in detail.      Rest, Ice, Compression and Elevation  OTC

## 2020-12-14 NOTE — LETTER
[id number] [facility]  [address] [place] [facility] [address]  Phone: [phone]  Fax: [place], [place]        December 14, 2020     Patient: Keerthi Cobb   YOB: 1955   Date of Visit: 12/14/2020       To Whom it May Concern:    Vaibhav Diamond was seen in my clinic on 12/14/2020. She may return to work on 12/15/20. Please allow patient to leave to attend physical therapy. .    If you have any questions or concerns, please don't hesitate to call.     Sincerely,         SAIMA Jay

## 2020-12-14 NOTE — LETTER
[address] [facility]  [address] [facility] [address]  Phone: [phone]  Fax: [place], [address]        December 14, 2020     Patient: David Mendosa   YOB: 1955   Date of Visit: 12/14/2020       To Whom it May Concern:    Consuelo Najera was seen in my clinic on 12/14/2020. She may return to work on 12/15/20. If you have any questions or concerns, please don't hesitate to call.     Sincerely,         SAIMA Knutson

## 2020-12-16 ENCOUNTER — TELEPHONE (OUTPATIENT)
Dept: ORTHOPEDIC SURGERY | Age: 65
End: 2020-12-16

## 2020-12-16 NOTE — TELEPHONE ENCOUNTER
Prescription Refill     Medication Name:  Pain Meds when she went to the [facility]    Pharmacy: [facility] [phone]    Patient Contact Number:  [phone]

## 2020-12-17 NOTE — TELEPHONE ENCOUNTER
I did not prescribed medications at the [facility].. Tried to call patient. No answer. I did not leave a message.
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
## 2020-12-31 RX ORDER — CITALOPRAM 40 MG/1
TABLET ORAL
Qty: 30 TABLET | Refills: 2 | Status: SHIPPED | OUTPATIENT
Start: 2020-12-31 | End: 2021-01-12

## 2020-12-31 RX ORDER — LISINOPRIL 2.5 MG/1
TABLET ORAL
Qty: 30 TABLET | Refills: 0 | Status: SHIPPED | OUTPATIENT
Start: 2020-12-31 | End: 2021-01-12

## 2021-01-12 DIAGNOSIS — F32.A DEPRESSION, UNSPECIFIED DEPRESSION TYPE: ICD-10-CM

## 2021-01-12 RX ORDER — CITALOPRAM 40 MG/1
TABLET ORAL
Qty: 30 TABLET | Refills: 2 | Status: SHIPPED | OUTPATIENT
Start: 2021-01-12 | End: 2021-01-27

## 2021-01-12 RX ORDER — LISINOPRIL 2.5 MG/1
TABLET ORAL
Qty: 30 TABLET | Refills: 0 | Status: SHIPPED | OUTPATIENT
Start: 2021-01-12 | End: 2021-03-10

## 2021-01-27 ENCOUNTER — TELEPHONE (OUTPATIENT)
Dept: INTERNAL MEDICINE CLINIC | Age: 66
End: 2021-01-27

## 2021-01-27 ENCOUNTER — VIRTUAL VISIT (OUTPATIENT)
Dept: INTERNAL MEDICINE CLINIC | Age: 66
End: 2021-01-27
Payer: COMMERCIAL

## 2021-01-27 ENCOUNTER — OFFICE VISIT (OUTPATIENT)
Dept: PRIMARY CARE CLINIC | Age: 66
End: 2021-01-27
Payer: COMMERCIAL

## 2021-01-27 DIAGNOSIS — M15.9 PRIMARY OSTEOARTHRITIS INVOLVING MULTIPLE JOINTS: ICD-10-CM

## 2021-01-27 DIAGNOSIS — F34.1 DYSTHYMIA: ICD-10-CM

## 2021-01-27 DIAGNOSIS — R19.7 DIARRHEA, UNSPECIFIED TYPE: ICD-10-CM

## 2021-01-27 DIAGNOSIS — R53.82 CHRONIC FATIGUE: ICD-10-CM

## 2021-01-27 DIAGNOSIS — Z20.822 SUSPECTED COVID-19 VIRUS INFECTION: Primary | ICD-10-CM

## 2021-01-27 PROCEDURE — 99211 OFF/OP EST MAY X REQ PHY/QHP: CPT | Performed by: NURSE PRACTITIONER

## 2021-01-27 PROCEDURE — 99214 OFFICE O/P EST MOD 30 MIN: CPT | Performed by: NURSE PRACTITIONER

## 2021-01-27 RX ORDER — SERTRALINE HYDROCHLORIDE 25 MG/1
25 TABLET, FILM COATED ORAL DAILY
Qty: 30 TABLET | Refills: 3 | Status: SHIPPED | OUTPATIENT
Start: 2021-01-27 | End: 2021-05-14 | Stop reason: SDUPTHER

## 2021-01-27 ASSESSMENT — ENCOUNTER SYMPTOMS
ABDOMINAL PAIN: 1
SORE THROAT: 0
BLOOD IN STOOL: 0
EYES NEGATIVE: 1
SINUS PRESSURE: 0
SHORTNESS OF BREATH: 0
VOMITING: 0
DIARRHEA: 1
COUGH: 0
NAUSEA: 1
SINUS PAIN: 0

## 2021-01-27 ASSESSMENT — PATIENT HEALTH QUESTIONNAIRE - PHQ9
SUM OF ALL RESPONSES TO PHQ QUESTIONS 1-9: 0
1. LITTLE INTEREST OR PLEASURE IN DOING THINGS: 0
2. FEELING DOWN, DEPRESSED OR HOPELESS: 0

## 2021-01-27 NOTE — ASSESSMENT & PLAN NOTE
Instructed to take Immodium OTC for diarrhea  Due for Colonoscopy - patient will call Dr. Emilie Langston to schedule  Patient is scheduling a COVID test  Encouraged increase in fluid intake  Call if symptoms worsen or fail to improve

## 2021-01-27 NOTE — ASSESSMENT & PLAN NOTE
Xray of right hip negative  Following with Dr. Willa Preston at [facility], suggested RICE and Ibuprofen as needed. PT prescription given, patient is trying to find a place that takes her insurance.

## 2021-01-27 NOTE — PROGRESS NOTES
Pratima Suarez received a viral test for COVID-19. They were educated on isolation and quarantine as appropriate. For any symptoms, they were directed to seek care from their PCP, given contact information to establish with a doctor, directed to an urgent care or the emergency room.

## 2021-01-27 NOTE — TELEPHONE ENCOUNTER
Pt calling about in-home sleep study - is she supposed to pick something up for the study or will materials be sent to her?

## 2021-01-27 NOTE — PROGRESS NOTES
Michael Herrera (:  1955) is a 77 y.o. female,Established patient, here for evaluation of the following chief complaint(s): Depression and Diarrhea      ASSESSMENT/PLAN:  1. Dysthymia  Assessment & Plan:  No improvement with Celexa  Will start Zoloft 25mg daily  Call if symptoms worsen  2. Diarrhea, unspecified type  Assessment & Plan:  Instructed to take Immodium OTC for diarrhea  Due for Colonoscopy - patient will call Dr. Saundra Whitney to schedule  Patient is scheduling a COVID test  Encouraged increase in fluid intake  Call if symptoms worsen or fail to improve  3. Chronic fatigue  Assessment & Plan:  Labs done in 2020, stable  Will order Sleep Study to be done  4. Primary osteoarthritis involving multiple joints  Assessment & Plan:  Xray of right hip negative  Following with Dr. Melissa Delgado at [facility], suggested RICE and Ibuprofen as needed. PT prescription given, patient is trying to find a place that takes her insurance. No follow-ups on file. SUBJECTIVE/OBJECTIVE:  CARL    Vladislav Soto is following up today via VV for depression and diarrhea. She was started on Celexa on 21 and she hasn't seen any improvement in her symptoms. She has been feeling fatigued with little energy. She states she usually keeps her house clean, but hasn't had any interest in cleaning at all. She also states that she has been having diarrhea for the past month. She is having 3-4 bowel movements a day, mostly liquid. She has abdominal cramping right before bowel movement. She is experiencing some nausea. Her appetite is ok, trying to eat and drink as much as she can. She had to leave work yesterday because she wasn't able to make it to the bathroom in time. She denies any blood in her stools. She denies any fevers or chills. She has not tried taking any medication OTC for the diarrhea. She hasn't been around anyone who has been sick lately.   She would like to get a COVID test.    Review of Systems Constitutional: Positive for fatigue. Negative for chills and fever. HENT: Negative for congestion, sinus pressure, sinus pain and sore throat. Eyes: Negative. Respiratory: Negative for cough and shortness of breath. Cardiovascular: Negative. Gastrointestinal: Positive for abdominal pain (cramping), diarrhea and nausea. Negative for blood in stool and vomiting. Endocrine: Negative for polyphagia and polyuria. Genitourinary: Negative for difficulty urinating, pelvic pain and urgency. Musculoskeletal: Positive for arthralgias (right hip pain). Negative for neck pain and neck stiffness. Skin: Negative. Neurological: Negative for dizziness, weakness, light-headedness and headaches. Hematological: Negative. Psychiatric/Behavioral: Negative. No flowsheet data found.      Physical Exam    [INSTRUCTIONS:  \"[x]\" Indicates a positive item  \"[]\" Indicates a negative item  -- DELETE ALL ITEMS NOT EXAMINED]    Constitutional: [x] Appears well-developed and well-nourished [x] No apparent distress      [] Abnormal -     Mental status: [x] Alert and awake  [x] Oriented to person/place/time [x] Able to follow commands    [] Abnormal -     Eyes:   EOM    [x]  Normal    [] Abnormal -   Sclera  [x]  Normal    [] Abnormal -          Discharge [x]  None visible   [] Abnormal -     HENT: [x] Normocephalic, atraumatic  [] Abnormal -   [x] Mouth/Throat: Mucous membranes are moist    External Ears [x] Normal  [] Abnormal -    Neck: [x] No visualized mass [] Abnormal -     Pulmonary/Chest: [x] Respiratory effort normal   [x] No visualized signs of difficulty breathing or respiratory distress        [] Abnormal -      Musculoskeletal:   [x] Normal gait with no signs of ataxia         [x] Normal range of motion of neck        [] Abnormal -     Neurological:        [x] No Facial Asymmetry (Cranial nerve 7 motor function) (limited exam due to video visit)          [x] No gaze palsy        [] Abnormal - Skin:        [x] No significant exanthematous lesions or discoloration noted on facial skin         [] Abnormal -            Psychiatric:       [x] Normal Affect [] Abnormal -        [x] No Hallucinations    Other pertinent observable physical exam findings:-        Unique Saucedo is a 77 y.o. female being evaluated by a Virtual Visit (video visit) encounter to address concerns as mentioned above. A caregiver was present when appropriate. Due to this being a TeleHealth encounter (During [place] [address] public [facility] emergency), evaluation of the following organ systems was limited: Vitals/Constitutional/EENT/Resp/CV/GI//MS/Neuro/Skin/Heme-Lymph-Imm. Pursuant to the emergency declaration under the [address] [place] [address], [address] [place] [address] authority and the [employer] Act, this Virtual Visit was conducted with patient's (and/or legal guardian's) consent, to reduce the patient's risk of exposure to COVID-19 and provide necessary medical care. The patient (and/or legal guardian) has also been advised to contact this office for worsening conditions or problems, and seek emergency medical treatment and/or call 911 if deemed necessary. Patient identification was verified at the start of the visit: Yes    Services were provided through a video synchronous discussion virtually to substitute for in-person clinic visit. Patient was located at home and provider was located in office or at home. An electronic signature was used to authenticate this note.     --HEATHER Sutherland - CNP

## 2021-01-28 LAB — SARS-COV-2, NAA: NOT DETECTED

## 2021-01-29 ENCOUNTER — APPOINTMENT (RX ONLY)
Dept: URBAN - METROPOLITAN AREA CLINIC 170 | Facility: CLINIC | Age: 66
Setting detail: DERMATOLOGY
End: 2021-01-29

## 2021-01-29 DIAGNOSIS — L82.0 INFLAMED SEBORRHEIC KERATOSIS: ICD-10-CM

## 2021-01-29 DIAGNOSIS — L81.4 OTHER MELANIN HYPERPIGMENTATION: ICD-10-CM | Status: STABLE

## 2021-01-29 DIAGNOSIS — D22 MELANOCYTIC NEVI: ICD-10-CM | Status: STABLE

## 2021-01-29 DIAGNOSIS — D18.0 HEMANGIOMA: ICD-10-CM | Status: STABLE

## 2021-01-29 DIAGNOSIS — L82.1 OTHER SEBORRHEIC KERATOSIS: ICD-10-CM | Status: STABLE

## 2021-01-29 DIAGNOSIS — L91.8 OTHER HYPERTROPHIC DISORDERS OF THE SKIN: ICD-10-CM

## 2021-01-29 PROBLEM — D18.01 HEMANGIOMA OF SKIN AND SUBCUTANEOUS TISSUE: Status: ACTIVE | Noted: 2021-01-29

## 2021-01-29 PROBLEM — D22.5 MELANOCYTIC NEVI OF TRUNK: Status: ACTIVE | Noted: 2021-01-29

## 2021-01-29 PROCEDURE — ? ADDITIONAL NOTES

## 2021-01-29 PROCEDURE — 99213 OFFICE O/P EST LOW 20 MIN: CPT | Mod: 25

## 2021-01-29 PROCEDURE — ? COUNSELING

## 2021-01-29 PROCEDURE — 17110 DESTRUCTION B9 LES UP TO 14: CPT

## 2021-01-29 PROCEDURE — ? FULL BODY SKIN EXAM

## 2021-01-29 PROCEDURE — ? TREATMENT REGIMEN

## 2021-01-29 PROCEDURE — ? LIQUID NITROGEN

## 2021-01-29 PROCEDURE — ? SUNSCREEN RECOMMENDATIONS

## 2021-01-29 ASSESSMENT — LOCATION DETAILED DESCRIPTION DERM
LOCATION DETAILED: INFERIOR THORACIC SPINE
LOCATION DETAILED: RIGHT INFERIOR LATERAL NECK
LOCATION DETAILED: RIGHT RIB CAGE
LOCATION DETAILED: EPIGASTRIC SKIN
LOCATION DETAILED: RIGHT ANTERIOR SHOULDER
LOCATION DETAILED: LEFT INFERIOR LATERAL NECK
LOCATION DETAILED: PERIUMBILICAL SKIN

## 2021-01-29 ASSESSMENT — LOCATION ZONE DERM
LOCATION ZONE: NECK
LOCATION ZONE: ARM
LOCATION ZONE: TRUNK

## 2021-01-29 ASSESSMENT — LOCATION SIMPLE DESCRIPTION DERM
LOCATION SIMPLE: LEFT ANTERIOR NECK
LOCATION SIMPLE: ABDOMEN
LOCATION SIMPLE: UPPER BACK
LOCATION SIMPLE: RIGHT ANTERIOR NECK
LOCATION SIMPLE: RIGHT SHOULDER

## 2021-01-29 NOTE — PROCEDURE: LIQUID NITROGEN
Include Z78.9 (Other Specified Conditions Influencing Health Status) As An Associated Diagnosis?: No
Detail Level: Detailed
Consent: The patient's consent was obtained including but not limited to risks of crusting, scabbing, blistering, scarring, darker or lighter pigmentary change, recurrence, incomplete removal and infection.
Medical Necessity Information: It is in your best interest to select a reason for this procedure from the list below. All of these items fulfill various CMS LCD requirements except the new and changing color options.
Number Of Freeze-Thaw Cycles: 2 freeze-thaw cycles
Medical Necessity Clause: This procedure was medically necessary because the lesions that were treated were:
Render Post-Care Instructions In Note?: yes
Post-Care Instructions: I reviewed with the patient in detail post-instructions. Patient is to wear sunprotection, and avoid picking at any of the treated lesions. Pt may apply Vaseline to crusted or scabbing areas.

## 2021-01-29 NOTE — HPI: EVALUATION OF SKIN LESION(S)
What Type Of Note Output Would You Prefer (Optional)?: Bullet Format
Hpi Title: Evaluation of Skin Lesions
How Severe Are Your Spot(S)?: mild
Have Your Spot(S) Been Treated In The Past?: has not been treated
Additional History: Patient states she has skin tags that she wants them removed

## 2021-02-01 ENCOUNTER — TELEPHONE (OUTPATIENT)
Dept: INTERNAL MEDICINE CLINIC | Age: 66
End: 2021-02-01

## 2021-02-01 ENCOUNTER — OFFICE VISIT (OUTPATIENT)
Dept: ORTHOPEDIC SURGERY | Age: 66
End: 2021-02-01
Payer: COMMERCIAL

## 2021-02-01 VITALS — WEIGHT: 175.04 LBS | BODY MASS INDEX: 35.29 KG/M2 | HEIGHT: 59 IN | TEMPERATURE: 97.2 F

## 2021-02-01 DIAGNOSIS — M79.605 LUMBAR PAIN WITH RADIATION DOWN BOTH LEGS: ICD-10-CM

## 2021-02-01 DIAGNOSIS — M54.50 LUMBAR PAIN WITH RADIATION DOWN BOTH LEGS: ICD-10-CM

## 2021-02-01 DIAGNOSIS — M51.36 DDD (DEGENERATIVE DISC DISEASE), LUMBAR: ICD-10-CM

## 2021-02-01 DIAGNOSIS — M43.10 DEGENERATIVE SPONDYLOLISTHESIS: ICD-10-CM

## 2021-02-01 DIAGNOSIS — Q76.49 CONGENITAL SPINAL STENOSIS OF LUMBAR REGION: ICD-10-CM

## 2021-02-01 DIAGNOSIS — M79.604 LUMBAR PAIN WITH RADIATION DOWN BOTH LEGS: ICD-10-CM

## 2021-02-01 DIAGNOSIS — M48.061 DEGENERATIVE LUMBAR SPINAL STENOSIS: ICD-10-CM

## 2021-02-01 PROCEDURE — 99203 OFFICE O/P NEW LOW 30 MIN: CPT | Performed by: INTERNAL MEDICINE

## 2021-02-01 RX ORDER — METHYLPREDNISOLONE 4 MG/1
TABLET ORAL
Qty: 1 KIT | Refills: 0 | Status: SHIPPED | OUTPATIENT
Start: 2021-02-01 | End: 2022-04-20 | Stop reason: SDUPTHER

## 2021-02-01 RX ORDER — TRAMADOL HYDROCHLORIDE 50 MG/1
50 TABLET ORAL EVERY 6 HOURS PRN
Qty: 20 TABLET | Refills: 0 | Status: SHIPPED | OUTPATIENT
Start: 2021-02-01 | End: 2021-02-08

## 2021-02-01 RX ORDER — CYCLOBENZAPRINE HCL 10 MG
10 TABLET ORAL NIGHTLY PRN
Qty: 30 TABLET | Refills: 1 | Status: SHIPPED | OUTPATIENT
Start: 2021-02-01 | End: 2022-05-09

## 2021-02-01 NOTE — TELEPHONE ENCOUNTER
----- Message from Tano Quesada sent at 2/1/2021  7:24 AM EST -----  Subject: Message to Provider    QUESTIONS  Information for Provider? Patient left work early on Tues   hasn't been in since; requesting a doctor's note to present to work. Still having diarrhea symptom. Would ideally like that faxed over to   employer + note of neg COVID test. Work Fax? [phone]  ---------------------------------------------------------------------------  --------------  Anjelica OSMAN  What is the best way for the office to contact you? OK to leave message on   voicemail  Preferred Call Back Phone Number? [phone]  ---------------------------------------------------------------------------  --------------  SCRIPT ANSWERS  Relationship to Patient?  Self

## 2021-02-01 NOTE — PROGRESS NOTES
Chief Complaint:   Chief Complaint   Patient presents with    Lower Back Pain     pain for yrs, \"tired of hurting\", LBP into post B legs, h/o osteopenia, DDD, HNP          History of Present Illness:       Patient is a 77 y.o. female presents with the above complaint. The symptoms began 1 yearsago and started without an injury. The patient describes a aching, burning pain that does radiate. The symptoms are constant  and are are worsening since the onset. She has a physically demanding job as a     Previous work-up has included MRI evaluation 2015 and there was evidence of degenerative anterolisthesis L4/L5 and mild to moderate central canal spinal stenosis at that time. The symptoms of back pain  do show a neurogenic claudication pattern and is worsened by standing and walking and improved with sitting. There is  weakness but no progressive weakness of the lower extremities that has developed. The patient denies new onset bowel or bladder dysfunction. There is no history of previous spinal trauma. Pain localizes to the lumbar region-lumbosacral band distribution    Pain levels: 9     There is bilateral lower limb pain, lower limb pain typically aching in quality more notable in the anterior thighs. The patient has no history or autoimmune disease, inflammatory arthropathy or crystal arthropathy. Past Medical History:        Past Medical History:   Diagnosis Date    Cardiomyopathy ([address] 1/7/2014    Dx @ [address]     Chronic back pain     Colon polyp     DDD (degenerative disc disease), lumbar 3/30/2015    Degenerative arthritis of cervical spine     Depression 12/16/2014    Essential hypertension, benign     Hyperlipidemia     Osteopenia     Varicose vein          Past Surgical History:   Procedure Laterality Date    COLONOSCOPY           Present Medications:         Current Outpatient Medications   Medication Sig Dispense Refill    methylPREDNISolone (MEDROL, GENEVA,) 4 MG tablet By mouth. 1 kit 0    traMADol (ULTRAM) 50 MG tablet Take 1 tablet by mouth every 6 hours as needed for Pain for up to 7 days. 20 tablet 0    cyclobenzaprine (FLEXERIL) 10 MG tablet Take 1 tablet by mouth nightly as needed for Muscle spasms 30 tablet 1    sertraline (ZOLOFT) 25 MG tablet Take 1 tablet by mouth daily 30 tablet 3    lisinopril (PRINIVIL;ZESTRIL) 2.5 MG tablet TAKE ONE TABLET BY MOUTH DAILY 30 tablet 0    pantoprazole (PROTONIX) 20 MG tablet Take 1 tablet by mouth daily 30 tablet 3    atorvastatin (LIPITOR) 10 MG tablet TAKE ONE TABLET BY MOUTH DAILY 90 tablet 1    metoprolol tartrate (LOPRESSOR) 25 MG tablet 1 tab in am, and 1/2 tab in pm 135 tablet 3    baclofen (LIORESAL) 10 MG tablet TAKE ONE TABLET BY MOUTH THREE TIMES A DAY 21 tablet 0    ibuprofen (IBU) 800 MG tablet Take 1 tablet by mouth every 8 hours as needed for Pain 20 tablet 0    Chlorphen-Phenyleph-APAP (CORICIDIN D COLD/FLU/SINUS) 2-5-325 MG TABS Take 2 tablets by mouth every 6 hours 168 tablet 0    calcium carbonate 600 MG TABS tablet Take 1 tablet by mouth daily      ibuprofen (ADVIL;MOTRIN) 100 MG/5ML suspension Take 4 tsp (20 mL po) q8h prn laryngitis and sore throat 300 mL 0    aspirin 81 MG chewable tablet Take 1 tablet by mouth daily. 30 tablet 2     No current facility-administered medications for this visit.           Allergies:      No Known Allergies     Social History:         Social History     Socioeconomic History    Marital status:      Spouse name: Not on file    Number of children: Not on file    Years of education: Not on file    Highest education level: Not on file   Occupational History    Not on file   Social Needs    Financial resource strain: Not on file    Food insecurity     Worry: Not on file     Inability: Not on file    Transportation needs     Medical: Not on file     Non-medical: Not on file   Tobacco Use    Smoking status: Former Smoker     Packs/day: 0.50 extremity      Special Tests: Negative SLR      Skin: There are no rashes, ulcerations or lesions. Gait: Nonantalgic      Reflex intact lower     Additional Comments:        Additional Examinations:           Right Lower Extremity: Examination of the right lower extremity does not show any tenderness, deformity or injury. Range of motion is unremarkable. There is no gross instability. There are no rashes, ulcerations or lesions. Strength and tone are normal.  Left Lower Extremity: Examination of the left lower extremity does not show any tenderness, deformity or injury. Range of motion is unremarkable. There is no gross instability. There are no rashes, ulcerations or lesions. Strength and tone are normal.   Neurolgic -Light touch sensation and manual muscle testing normal L2-S1. No fasiculations. Pattella tendon and Achilles tendon reflexes +2 bilaterally. Seated SLR negative        Office Imaging Results/Procedures PerformedToday:      Radiology:      X-rays obtained and reviewed in office:   Views 3 views   Location lumbar spine   Impression there is degenerative tearing spondylolisthesis L4 and L5 moderate facet arthropathy L4/L5 greater than L3/L4. There is moderate posterior intervertebral space narrowing at L1/L2. Vertebral body heights are well-maintained. There is calcification of anterior chest wall. Hyperlordosis of the lumbar spine. Normal alignment on the AP projection       Office Procedures:     Orders Placed This Encounter   Procedures    XR LUMBAR SPINE (2-3 VIEWS)     Standing Status:   Future     Number of Occurrences:   1     Standing Expiration Date:   2/1/2022     Order Specific Question:   Reason for exam:     Answer:   pain    MRI LUMBAR SPINE WO CONTRAST     Standing Status:   Future     Standing Expiration Date:   2/1/2022     Scheduling Instructions:      Playground Sessions       Contact patient to schedule      Advised patient to schedule and F/U in office in 2 days. Order Specific Question:   Reason for exam:     Answer:   R/O Spinal stenosis           Other Outside Imaging and Testing Personally Reviewed:    Xr Lumbar Spine (2-3 Views)    Result Date: 2021  Radiology exam is complete. No Radiologist dictation. Please follow up with ordering provider. MRI report was reviewed: Impression: \"Scattered levels of degenerative disease and facet arthropathy as above in a patient with underlying scoliosis. There are multiple levels of nerve root abutment. Changes are most significant for the followin mild to moderate central canal stenosis at L4-L5 and L5-S1. Spinal canal is developmentally small secondary to short pedicles. Small disc herniation at L5-S1 synovial cyst projects into the spinal canal of the facet joint on the left at this level. There is abutment of the descending S1 nerve roots left greater than right at this level. Abutment of the exiting nerve roots on the left at L3/L4 and L4/L5        Assessment   Impression: . Encounter Diagnoses   Name Primary?  Degenerative lumbar spinal stenosis     Degenerative spondylolisthesis     DDD (degenerative disc disease), lumbar     Lumbar pain with radiation down both legs     Congenital spinal stenosis of lumbar region               Plan:       MRI evaluation lumbar spine evaluate severity of spinal stenosis suspected clinically  Medical pain management-Medrol Dosepak as needed use of Ultram minimize use of narcotics Flexeril nightly as needed  Activity modification-spinal stenosis protocol  Consider her candidate for lumbar epidural injection the results of MRI      The nature of the finding, probable diagnosis and likely treatment was thoroughly discussed with the patient. The options, risks, complications, alternative treatment as well as some of the differential diagnosis was discussed. The patient was thoroughly informed and all questions were answered.  the patient indicated understanding and

## 2021-02-01 NOTE — TELEPHONE ENCOUNTER
----- Message from Nelia Vo sent at 2/1/2021  7:25 AM EST -----  Subject: Message to Provider    QUESTIONS  Information for Provider? Patient wanted to let know wasn't able to get   meds but has Q about why symptoms even w/ neg COVID. Please reach out when   possible.  ---------------------------------------------------------------------------  --------------  CALL BACK INFO  What is the best way for the office to contact you? OK to leave message on   voicemail  Preferred Call Back Phone Number? [phone]  ---------------------------------------------------------------------------  --------------  SCRIPT ANSWERS  Relationship to Patient?  Self

## 2021-02-01 NOTE — TELEPHONE ENCOUNTER
----- Message from Tasneem Marcus sent at 1/29/2021  4:41 PM EST -----  Subject: Message to Provider    QUESTIONS  Information for Provider? Patient would like the office to send( fax) her   Covid results to her work so she can return back . ATT? Mike Regalado   [phone]  ---------------------------------------------------------------------------  --------------  Norm Clamp INFO  What is the best way for the office to contact you? OK to leave message on   voicemail  Preferred Call Back Phone Number? [phone]  ---------------------------------------------------------------------------  --------------  SCRIPT ANSWERS  Relationship to Patient?  Self

## 2021-02-04 ENCOUNTER — TELEPHONE (OUTPATIENT)
Dept: INTERNAL MEDICINE CLINIC | Age: 66
End: 2021-02-04

## 2021-02-04 NOTE — TELEPHONE ENCOUNTER
----- Message from Farrukh Downey sent at 2/4/2021  7:52 AM EST -----  Subject: Message to Provider    QUESTIONS  Information for Provider? Patient has been out of work since last Tuesday   needs medical excuse faxed to her job?  Kayla Ricci  ---------------------------------------------------------------------------  --------------  Saloni Loandesk INFO  What is the best way for the office to contact you? OK to leave message on   voicemail  Preferred Call Back Phone Number? [phone]  ---------------------------------------------------------------------------  --------------  SCRIPT ANSWERS  Relationship to Patient?  Self

## 2021-02-08 ENCOUNTER — TELEPHONE (OUTPATIENT)
Dept: INTERNAL MEDICINE CLINIC | Age: 66
End: 2021-02-08

## 2021-02-08 NOTE — TELEPHONE ENCOUNTER
----- Message from Zoya Rebollar sent at 2/8/2021  9:02 AM EST -----  Subject: Message to Provider    QUESTIONS  Information for Provider? Pt checking on status of her working excuse   being faxed to  [employer].  ---------------------------------------------------------------------------  --------------  Vj OSMAN  What is the best way for the office to contact you? OK to leave message on   voicemail  Preferred Call Back Phone Number? [phone]  ---------------------------------------------------------------------------  --------------  SCRIPT ANSWERS  Relationship to Patient?  Self

## 2021-03-01 ENCOUNTER — TELEPHONE (OUTPATIENT)
Dept: ADMINISTRATIVE | Age: 66
End: 2021-03-01

## 2021-03-04 ENCOUNTER — IMMUNIZATION (OUTPATIENT)
Dept: PRIMARY CARE CLINIC | Age: 66
End: 2021-03-04
Payer: COMMERCIAL

## 2021-03-04 PROCEDURE — 0001A COVID-19, PFIZER VACCINE 30MCG/0.3ML DOSE: CPT | Performed by: FAMILY MEDICINE

## 2021-03-04 PROCEDURE — 91300 COVID-19, PFIZER VACCINE 30MCG/0.3ML DOSE: CPT | Performed by: FAMILY MEDICINE

## 2021-03-10 RX ORDER — LISINOPRIL 2.5 MG/1
TABLET ORAL
Qty: 30 TABLET | Refills: 0 | Status: SHIPPED | OUTPATIENT
Start: 2021-03-10 | End: 2021-04-12

## 2021-03-16 NOTE — TELEPHONE ENCOUNTER
Requested Prescriptions     Pending Prescriptions Disp Refills    metoprolol tartrate (LOPRESSOR) 25 MG tablet [Pharmacy Med Name: METOPROLOL TARTRATE 25 MG TAB] 135 tablet 1     Sig: TAKE ONE TABLET BY MOUTH EVERY MORNING AND TAKE ONE-HALF TABLET BY MOUTH EVERY EVENING                Last Office Visit: 6/8/2020     Next Office Visit: 3/29/2021

## 2021-03-25 ENCOUNTER — IMMUNIZATION (OUTPATIENT)
Dept: PRIMARY CARE CLINIC | Age: 66
End: 2021-03-25
Payer: COMMERCIAL

## 2021-03-25 PROCEDURE — 0002A COVID-19, PFIZER VACCINE 30MCG/0.3ML DOSE: CPT | Performed by: FAMILY MEDICINE

## 2021-03-25 PROCEDURE — 91300 COVID-19, PFIZER VACCINE 30MCG/0.3ML DOSE: CPT | Performed by: FAMILY MEDICINE

## 2021-03-29 ENCOUNTER — OFFICE VISIT (OUTPATIENT)
Dept: CARDIOLOGY CLINIC | Age: 66
End: 2021-03-29
Payer: COMMERCIAL

## 2021-03-29 VITALS
DIASTOLIC BLOOD PRESSURE: 80 MMHG | WEIGHT: 174.6 LBS | BODY MASS INDEX: 35.25 KG/M2 | SYSTOLIC BLOOD PRESSURE: 124 MMHG | HEART RATE: 67 BPM | TEMPERATURE: 97.3 F

## 2021-03-29 DIAGNOSIS — E78.5 HYPERLIPIDEMIA, UNSPECIFIED HYPERLIPIDEMIA TYPE: ICD-10-CM

## 2021-03-29 DIAGNOSIS — I10 HTN (HYPERTENSION), BENIGN: ICD-10-CM

## 2021-03-29 DIAGNOSIS — I25.10 CORONARY ARTERY DISEASE INVOLVING NATIVE CORONARY ARTERY OF NATIVE HEART WITHOUT ANGINA PECTORIS: ICD-10-CM

## 2021-03-29 DIAGNOSIS — I42.0 DILATED CARDIOMYOPATHY (HCC): Primary | ICD-10-CM

## 2021-03-29 PROCEDURE — 99213 OFFICE O/P EST LOW 20 MIN: CPT | Performed by: INTERNAL MEDICINE

## 2021-03-29 NOTE — PROGRESS NOTES
Subjective:      Patient ID: Deanne John is a 77 y.o. female. CC:  Palpitations. Chest pain,     HPI Franco Thompson had dizziness and palpitations that are now resolved with additional toprol 12.5 in the evening and continuing the 25 mg in the am.    Has heart burn.   ECG was normal.      No Known Allergies     Social History     Socioeconomic History    Marital status:      Spouse name: Not on file    Number of children: Not on file    Years of education: Not on file    Highest education level: Not on file   Occupational History    Not on file   Social Needs    Financial resource strain: Not on file    Food insecurity     Worry: Not on file     Inability: Not on file    Transportation needs     Medical: Not on file     Non-medical: Not on file   Tobacco Use    Smoking status: Former Smoker     Packs/day: 0.50     Years: 41.00     Pack years: 20.50     Types: Cigarettes     Start date: 2013     Quit date: 2013     Years since quittin.5    Smokeless tobacco: Never Used   Substance and Sexual Activity    Alcohol use: No    Drug use: No    Sexual activity: Not Currently     Partners: Male     Birth control/protection: Condom   Lifestyle    Physical activity     Days per week: Not on file     Minutes per session: Not on file    Stress: Not on file   Relationships    Social connections     Talks on phone: Not on file     Gets together: Not on file     Attends [employer] service: Not on file     Active member of club or organization: Not on file     Attends meetings of clubs or organizations: Not on file     Relationship status: Not on file    Intimate partner violence     Fear of current or ex partner: Not on file     Emotionally abused: Not on file     Physically abused: Not on file     Forced sexual activity: Not on file   Other Topics Concern    Not on file   Social History Narrative    Not on file        Patient has a family history includes Cancer in her father; Colon Cancer in her brother; Coronary Art Dis in an other family member; Hypertension in an other family member; Other in an other family member. Patient  has a past medical history of Cardiomyopathy ([address] Chronic back pain, Colon polyp, DDD (degenerative disc disease), lumbar, Degenerative arthritis of cervical spine, Depression, Essential hypertension, benign, Hyperlipidemia, Osteopenia, and Varicose vein. Current Outpatient Medications   Medication Sig Dispense Refill    atorvastatin (LIPITOR) 10 MG tablet TAKE ONE TABLET BY MOUTH DAILY 30 tablet 3    metoprolol tartrate (LOPRESSOR) 25 MG tablet TAKE ONE TABLET BY MOUTH EVERY MORNING AND TAKE ONE-HALF TABLET BY MOUTH EVERY EVENING 135 tablet 1    lisinopril (PRINIVIL;ZESTRIL) 2.5 MG tablet TAKE ONE TABLET BY MOUTH DAILY 30 tablet 0    methylPREDNISolone (MEDROL, GENEVA,) 4 MG tablet By mouth. 1 kit 0    cyclobenzaprine (FLEXERIL) 10 MG tablet Take 1 tablet by mouth nightly as needed for Muscle spasms 30 tablet 1    sertraline (ZOLOFT) 25 MG tablet Take 1 tablet by mouth daily 30 tablet 3    pantoprazole (PROTONIX) 20 MG tablet Take 1 tablet by mouth daily 30 tablet 3    baclofen (LIORESAL) 10 MG tablet TAKE ONE TABLET BY MOUTH THREE TIMES A DAY 21 tablet 0    ibuprofen (IBU) 800 MG tablet Take 1 tablet by mouth every 8 hours as needed for Pain 20 tablet 0    Chlorphen-Phenyleph-APAP (CORICIDIN D COLD/FLU/SINUS) 2-5-325 MG TABS Take 2 tablets by mouth every 6 hours 168 tablet 0    calcium carbonate 600 MG TABS tablet Take 1 tablet by mouth daily      ibuprofen (ADVIL;MOTRIN) 100 MG/5ML suspension Take 4 tsp (20 mL po) q8h prn laryngitis and sore throat 300 mL 0    aspirin 81 MG chewable tablet Take 1 tablet by mouth daily. 30 tablet 2     No current facility-administered medications for this visit. Vitals  Weight: 174 lb 9.6 oz (79.2 kg)  Blood Pressure: BP: (124)/(80)    Pulse: 67       Review of Systems   Constitutional: Negative. HENT: Negative. Eyes: Negative. Respiratory: Negative. Cardiovascular: Negative. Gastrointestinal: Negative. Endocrine: Negative. Genitourinary: Negative. Musculoskeletal: Negative. Skin: Negative. Allergic/Immunologic: Negative. Neurological: Negative. Hematological: Negative. Psychiatric/Behavioral: Negative. Objective:   Physical Exam   Nursing note and vitals reviewed. Constitutional: She is oriented to person, place, and time. She appears well-developed and well-nourished. HENT:   Head: Normocephalic and atraumatic. Eyes: Conjunctivae are normal. Pupils are equal, round, and reactive to light. Neck: Normal range of motion. Neck supple. No JVD present. No tracheal deviation present. No thyromegaly present. Cardiovascular: Normal rate, regular rhythm, normal heart sounds and intact distal pulses. Exam reveals no gallop and no friction rub. No murmur heard. Pulmonary/Chest: Effort normal. No respiratory distress. She has no wheezes. She has no rales. She exhibits no tenderness. Abdominal: Soft. Bowel sounds are normal. She exhibits no distension and no mass. There is no tenderness. There is no rebound and no guarding. Musculoskeletal: She exhibits no edema and no tenderness. Lymphadenopathy:     She has no cervical adenopathy. Neurological: She is alert and oriented to person, place, and time. No cranial nerve deficit. Coordination normal.   Skin: Skin is warm and dry. No rash noted. No erythema. No pallor. Psychiatric: She has a normal mood and affect.  Her behavior is normal. Judgment and thought content normal.       Assessment:      Patient Active Problem List   Diagnosis    Essential hypertension, benign    Osteopenia    Degenerative arthritis of cervical spine    Cardiomyopathy ([address]    Non-ischemic cardiomyopathy ([address]    Depression    DDD (degenerative disc disease), lumbar    Primary osteoarthritis involving multiple joints    Diarrhea    Chronic fatigue            Plan:      Dizziness and palpitations. Resolved as She is taking toprol xl 25 mg daily in am and 12.5 mg in the evening to treat palpitations. Chest and shoulder pain:  MPI was normal in 5/2018. history of takosubu CMP from 1 2014 that has since resolved. Claudication:  She quit smoking and has back pain with scoliosis and leg pain was getting sclerotherapy for LE varicosities. I recommended seeing a vascular surgeon.    Acid reflux: takes tums

## 2021-04-12 ENCOUNTER — TELEPHONE (OUTPATIENT)
Dept: ORTHOPEDIC SURGERY | Age: 66
End: 2021-04-12

## 2021-04-12 RX ORDER — LISINOPRIL 2.5 MG/1
TABLET ORAL
Qty: 30 TABLET | Refills: 0 | Status: SHIPPED | OUTPATIENT
Start: 2021-04-12 | End: 2021-05-12

## 2021-04-13 ENCOUNTER — TELEPHONE (OUTPATIENT)
Dept: ORTHOPEDIC SURGERY | Age: 66
End: 2021-04-13

## 2021-05-04 ENCOUNTER — TELEPHONE (OUTPATIENT)
Dept: INTERNAL MEDICINE CLINIC | Age: 66
End: 2021-05-04

## 2021-05-04 NOTE — TELEPHONE ENCOUNTER
----- Message from Katy Tomlinson sent at 5/4/2021 10:19 AM EDT -----  Subject: Message to Provider    QUESTIONS  Information for Provider? Daniela Ruffin needs her yearly physical for work by   06/01/2021 there were no available times would like to try to schedule   something. screened green  ---------------------------------------------------------------------------  --------------  CALL BACK INFO  What is the best way for the office to contact you? OK to leave message on   voicemail  Preferred Call Back Phone Number? [phone]  ---------------------------------------------------------------------------  --------------  SCRIPT ANSWERS  Relationship to Patient?  Self

## 2021-05-06 ENCOUNTER — TELEPHONE (OUTPATIENT)
Dept: ORTHOPEDIC SURGERY | Age: 66
End: 2021-05-06

## 2021-05-06 NOTE — TELEPHONE ENCOUNTER
PATIENT IS WANTING TO KNOW WHERE DOES SHE GO TO GET HER MRI. SHE ALSO WANTS TO KNOW IF ITS OK TO GO TO THE GYM. PLEASE CALL TO [facility] [phone].

## 2021-05-07 ENCOUNTER — TELEPHONE (OUTPATIENT)
Dept: ORTHOPEDIC SURGERY | Age: 66
End: 2021-05-07

## 2021-05-07 NOTE — TELEPHONE ENCOUNTER
LM for pt, Her MRI Ellena Rubinstein has  (was 21-3/3/21). I LM that we will need to check with the  to see if a new MRI can be ordered or if she needs to come in for OV for re-eval.  Also advised that she may go to the gym at her discretion, but to use pain as a guide. Modify exercises or do not do if they cause pt increased pain. Please advise if MRI can be re-ordered.

## 2021-05-07 NOTE — TELEPHONE ENCOUNTER
General Question     Subject: MRI ORDER  Patient and /or Facility Request: [facility]  Contact Number: [phone]  MRI ORDER  PATIENT WOULD LIKE TO KNOW IF IT CAN BE RE-WRITTEN OR DOES SHE NEED TO COME BACK INTO THE OFFICE

## 2021-05-11 ENCOUNTER — OFFICE VISIT (OUTPATIENT)
Dept: PRIMARY CARE CLINIC | Age: 66
End: 2021-05-11
Payer: COMMERCIAL

## 2021-05-11 DIAGNOSIS — Z01.818 PRE-OP EXAMINATION: Primary | ICD-10-CM

## 2021-05-11 PROCEDURE — 99421 OL DIG E/M SVC 5-10 MIN: CPT | Performed by: NURSE PRACTITIONER

## 2021-05-12 DIAGNOSIS — M48.061 DEGENERATIVE LUMBAR SPINAL STENOSIS: ICD-10-CM

## 2021-05-12 DIAGNOSIS — M79.604 LUMBAR PAIN WITH RADIATION DOWN BOTH LEGS: Primary | ICD-10-CM

## 2021-05-12 DIAGNOSIS — M79.605 LUMBAR PAIN WITH RADIATION DOWN BOTH LEGS: Primary | ICD-10-CM

## 2021-05-12 DIAGNOSIS — M43.10 DEGENERATIVE SPONDYLOLISTHESIS: ICD-10-CM

## 2021-05-12 DIAGNOSIS — M51.36 DDD (DEGENERATIVE DISC DISEASE), LUMBAR: ICD-10-CM

## 2021-05-12 DIAGNOSIS — M54.50 LUMBAR PAIN WITH RADIATION DOWN BOTH LEGS: Primary | ICD-10-CM

## 2021-05-12 DIAGNOSIS — Q76.49 CONGENITAL SPINAL STENOSIS OF LUMBAR REGION: ICD-10-CM

## 2021-05-12 LAB — SARS-COV-2: NOT DETECTED

## 2021-05-12 RX ORDER — LISINOPRIL 2.5 MG/1
TABLET ORAL
Qty: 30 TABLET | Refills: 0 | Status: SHIPPED | OUTPATIENT
Start: 2021-05-12 | End: 2021-06-08

## 2021-05-12 NOTE — TELEPHONE ENCOUNTER
Proceed with MRI and if approval grandted.   Otherwise will need to come in to office to update her clinical exam.thanks

## 2021-05-14 ENCOUNTER — OFFICE VISIT (OUTPATIENT)
Dept: INTERNAL MEDICINE CLINIC | Age: 66
End: 2021-05-14
Payer: COMMERCIAL

## 2021-05-14 VITALS
DIASTOLIC BLOOD PRESSURE: 68 MMHG | WEIGHT: 174 LBS | OXYGEN SATURATION: 98 % | HEIGHT: 62 IN | HEART RATE: 70 BPM | BODY MASS INDEX: 32.02 KG/M2 | SYSTOLIC BLOOD PRESSURE: 122 MMHG

## 2021-05-14 DIAGNOSIS — Z00.00 ROUTINE GENERAL MEDICAL EXAMINATION AT A HEALTH CARE FACILITY: Primary | ICD-10-CM

## 2021-05-14 DIAGNOSIS — R53.82 CHRONIC FATIGUE: ICD-10-CM

## 2021-05-14 DIAGNOSIS — F34.1 DYSTHYMIA: ICD-10-CM

## 2021-05-14 DIAGNOSIS — Z00.00 ROUTINE GENERAL MEDICAL EXAMINATION AT A HEALTH CARE FACILITY: ICD-10-CM

## 2021-05-14 LAB
A/G RATIO: 1.7 (ref 1.1–2.2)
ALBUMIN SERPL-MCNC: 4.2 G/DL (ref 3.4–5)
ALP BLD-CCNC: 88 U/L (ref 40–129)
ALT SERPL-CCNC: 22 U/L (ref 10–40)
ANION GAP SERPL CALCULATED.3IONS-SCNC: 11 MMOL/L (ref 3–16)
AST SERPL-CCNC: 25 U/L (ref 15–37)
BILIRUB SERPL-MCNC: 0.4 MG/DL (ref 0–1)
BUN BLDV-MCNC: 21 MG/DL (ref 7–20)
CALCIUM SERPL-MCNC: 9.4 MG/DL (ref 8.3–10.6)
CHLORIDE BLD-SCNC: 105 MMOL/L (ref 99–110)
CHOLESTEROL, FASTING: 229 MG/DL (ref 0–199)
CO2: 26 MMOL/L (ref 21–32)
CREAT SERPL-MCNC: 0.7 MG/DL (ref 0.6–1.2)
GFR AFRICAN AMERICAN: >60
GFR NON-AFRICAN AMERICAN: >60
GLOBULIN: 2.5 G/DL
GLUCOSE BLD-MCNC: 86 MG/DL (ref 70–99)
HCT VFR BLD CALC: 40.3 % (ref 36–48)
HDLC SERPL-MCNC: 57 MG/DL (ref 40–60)
HEMOGLOBIN: 13.8 G/DL (ref 12–16)
LDL CHOLESTEROL CALCULATED: 135 MG/DL
MCH RBC QN AUTO: 30.9 PG (ref 26–34)
MCHC RBC AUTO-ENTMCNC: 34.3 G/DL (ref 31–36)
MCV RBC AUTO: 90 FL (ref 80–100)
PDW BLD-RTO: 12.9 % (ref 12.4–15.4)
PLATELET # BLD: 203 K/UL (ref 135–450)
PMV BLD AUTO: 9 FL (ref 5–10.5)
POTASSIUM SERPL-SCNC: 4.7 MMOL/L (ref 3.5–5.1)
RBC # BLD: 4.48 M/UL (ref 4–5.2)
SODIUM BLD-SCNC: 142 MMOL/L (ref 136–145)
TOTAL PROTEIN: 6.7 G/DL (ref 6.4–8.2)
TRIGLYCERIDE, FASTING: 186 MG/DL (ref 0–150)
TSH REFLEX: 1.56 UIU/ML (ref 0.27–4.2)
VITAMIN D 25-HYDROXY: 28.6 NG/ML
VLDLC SERPL CALC-MCNC: 37 MG/DL
WBC # BLD: 6 K/UL (ref 4–11)

## 2021-05-14 PROCEDURE — 90471 IMMUNIZATION ADMIN: CPT | Performed by: NURSE PRACTITIONER

## 2021-05-14 PROCEDURE — 90750 HZV VACC RECOMBINANT IM: CPT | Performed by: NURSE PRACTITIONER

## 2021-05-14 PROCEDURE — 99214 OFFICE O/P EST MOD 30 MIN: CPT | Performed by: NURSE PRACTITIONER

## 2021-05-14 PROCEDURE — 99397 PER PM REEVAL EST PAT 65+ YR: CPT | Performed by: NURSE PRACTITIONER

## 2021-05-14 ASSESSMENT — PATIENT HEALTH QUESTIONNAIRE - PHQ9
2. FEELING DOWN, DEPRESSED OR HOPELESS: 0
1. LITTLE INTEREST OR PLEASURE IN DOING THINGS: 0
SUM OF ALL RESPONSES TO PHQ QUESTIONS 1-9: 0

## 2021-05-14 ASSESSMENT — ENCOUNTER SYMPTOMS
CHEST TIGHTNESS: 0
CONSTIPATION: 0
VOMITING: 0
RHINORRHEA: 0
BACK PAIN: 0
WHEEZING: 0
NAUSEA: 0
DIARRHEA: 0
BLOOD IN STOOL: 0
SINUS PRESSURE: 0
SHORTNESS OF BREATH: 0
SORE THROAT: 0
EYE REDNESS: 0
COUGH: 0
EYE ITCHING: 0
COLOR CHANGE: 0
ABDOMINAL PAIN: 0

## 2021-05-14 NOTE — PROGRESS NOTES
Subjective:     CC:  Annual Exam      HPI:  Stanley Acevedo is here for a comprehensive physical exam and to discuss her depression. She states she no longer wants to work does not want to get out of bed. Wants to lose weight but denies time to diet or exercise. She is not making any efforts towards diet or exercise. She wants to know how she can just simply lose weight. She is interested in seeing a psychologist.  She has never seen one before. Vitals:    21 0824   BP: 122/68   Pulse: 70   SpO2: 98%       Wt Readings from Last 3 Encounters:   21 174 lb (78.9 kg)   21 174 lb 9.6 oz (79.2 kg)   21 175 lb 0.7 oz (79.4 kg)       Past Medical History:   Diagnosis Date    Cardiomyopathy ([address] 2014    Dx @ [facility].     Chronic back pain     Colon polyp     DDD (degenerative disc disease), lumbar 3/30/2015    Degenerative arthritis of cervical spine     Depression 2014    Essential hypertension, benign     Hyperlipidemia     Osteopenia     Varicose vein        Past Surgical History:   Procedure Laterality Date    COLONOSCOPY         Family History   Problem Relation Age of Onset    Colon Cancer Brother     Coronary Art Dis Other     Hypertension Other     Cancer Father         oral cancer    Other Other         PVOD       Social History     Tobacco Use    Smoking status: Former Smoker     Packs/day: 0.50     Years: 41.00     Pack years: 20.50     Types: Cigarettes     Start date: 2013     Quit date: 2013     Years since quittin.7    Smokeless tobacco: Never Used   Substance Use Topics    Alcohol use: No    Drug use: No       Immunization History   Administered Date(s) Administered    COVID-19, Pfizer, PF, 30mcg/0.3mL 2021, 2021    Influenza, Quadv, IM, (6 mo and older Fluzone, Flulaval, Fluarix and 3 yrs and older Afluria) 2019    Pneumococcal Conjugate 13-valent ([id number]) 2018    Pneumococcal Polysaccharide ([id number]) 10/24/2019    Tdap (Boostrix, Adacel) 11/04/2016    Zoster Recombinant (Shingrix) 05/14/2021       Health Maintenance   Topic Date Due    Colon cancer screen colonoscopy  06/15/2018    A1C test (Diabetic or Prediabetic)  05/21/2020    Lipid screen  05/21/2020    Breast cancer screen  05/20/2021    Shingles Vaccine (2 of 2) 07/09/2021    Potassium monitoring  08/10/2021    Creatinine monitoring  08/10/2021    DTaP/Tdap/Td vaccine (2 - Td) 11/04/2026    DEXA (modify frequency per FRAX score)  Completed    Flu vaccine  Completed    Pneumococcal 65+ years Vaccine  Completed    COVID-19 Vaccine  Completed    Hepatitis C screen  Completed    Hepatitis A vaccine  Aged Out    Hepatitis B vaccine  Aged Out    Hib vaccine  Aged Out    Meningococcal (ACWY) vaccine  Aged Out       Review of Systems   Constitutional: Negative for chills, fatigue and fever. HENT: Negative for congestion, ear pain, postnasal drip, rhinorrhea, sinus pressure, sneezing and sore throat. Eyes: Negative for redness and itching. Respiratory: Negative for cough, chest tightness, shortness of breath and wheezing. Cardiovascular: Negative for chest pain and palpitations. Gastrointestinal: Negative for abdominal pain, blood in stool, constipation, diarrhea, nausea and vomiting. Endocrine: Negative for cold intolerance and heat intolerance. Genitourinary: Negative for difficulty urinating, dysuria, flank pain, frequency, hematuria and urgency. Musculoskeletal: Negative for arthralgias, back pain, joint swelling and myalgias. Skin: Negative for color change, pallor, rash and wound. Allergic/Immunologic: Negative for environmental allergies and food allergies. Neurological: Negative for dizziness, seizures, syncope, weakness, light-headedness, numbness and headaches. Hematological: Negative for adenopathy. Does not bruise/bleed easily.    Psychiatric/Behavioral: Negative for confusion, sleep disturbance office  HM reviewed  She is already scheduled for cscope and mammo  Shingles vaccine today    Chronic fatigue   Michelle Juárez suffers from some worsening depression that is making her more fatigued  We will increase her zoloft and also refer to Dr. Fili Forman  She has low desire for activities     Depression   Increase zoloft   Dr. Fili Forman referral                  Discussed over the counter medication with patient. Cami received counseling on the following healthybehaviors: nutrition, exercise, and medication adherence    Patient given educational materials on their chronic medical conditions    Discussed use, benefit, and side effects of prescribed medications. Barriersto medication compliance addressed. All patient questions answered. Patient voiced understanding. Medications reviewed and patient understands.   Questions answered

## 2021-05-14 NOTE — LETTER
[facility] [address]  [address] [facility] [address] [facility] [address]  Phone: [phone]  Fax: [phone]    Bevin Hashimoto, APRN - CNP        May 14, 2021     Patient: Savannah Soto   YOB: 1955   Date of Visit: 5/14/2021       To Whom It May Concern: It is my medical opinion that Vonnie Sheree was in office 5/14/21. If you have any questions or concerns, please don't hesitate to call.     Sincerely,        Bevin Hashimoto, APRN - CNP

## 2021-05-14 NOTE — ASSESSMENT & PLAN NOTE
Sammy Davis suffers from some worsening depression that is making her more fatigued  We will increase her zoloft and also refer to Dr. Cesilia Ortiz  She has low desire for activities

## 2021-05-14 NOTE — ASSESSMENT & PLAN NOTE
Well exam in office  HM reviewed  She is already scheduled for cscope and mammo  Shingles vaccine today

## 2021-05-15 LAB
ESTIMATED AVERAGE GLUCOSE: 111.2 MG/DL
HBA1C MFR BLD: 5.5 %

## 2021-05-26 ENCOUNTER — TELEPHONE (OUTPATIENT)
Dept: INTERNAL MEDICINE CLINIC | Age: 66
End: 2021-05-26

## 2021-05-26 NOTE — TELEPHONE ENCOUNTER
Pt's cholesterol came back high  What would Mary like to do about that?   She never heard from anyone in the office after her labs were drawn

## 2021-06-01 ENCOUNTER — TELEPHONE (OUTPATIENT)
Dept: ORTHOPEDIC SURGERY | Age: 66
End: 2021-06-01

## 2021-06-07 ENCOUNTER — TELEPHONE (OUTPATIENT)
Dept: INTERNAL MEDICINE CLINIC | Age: 66
End: 2021-06-07

## 2021-06-07 RX ORDER — ETODOLAC 400 MG/1
400 TABLET, FILM COATED ORAL 2 TIMES DAILY
Qty: 60 TABLET | Refills: 3 | Status: SHIPPED | OUTPATIENT
Start: 2021-06-07 | End: 2021-06-08 | Stop reason: SDUPTHER

## 2021-06-07 NOTE — TELEPHONE ENCOUNTER
Pt's sister also has spinal stenosis and told her about the medication she takes called etodolac 400 MG  Pt asking if she can get that prescribed as well

## 2021-06-08 ENCOUNTER — TELEPHONE (OUTPATIENT)
Dept: INTERNAL MEDICINE CLINIC | Age: 66
End: 2021-06-08

## 2021-06-08 RX ORDER — ETODOLAC 400 MG/1
400 TABLET, FILM COATED ORAL 2 TIMES DAILY
Qty: 60 TABLET | Refills: 3 | Status: SHIPPED | OUTPATIENT
Start: 2021-06-08 | End: 2022-05-09 | Stop reason: DRUGHIGH

## 2021-06-08 RX ORDER — LISINOPRIL 2.5 MG/1
TABLET ORAL
Qty: 30 TABLET | Refills: 0 | Status: SHIPPED | OUTPATIENT
Start: 2021-06-08 | End: 2021-07-06

## 2021-06-08 NOTE — TELEPHONE ENCOUNTER
etodolac (LODINE) 400 MG tablet   E-Prescribing Status: Transmission to pharmacy in progress (6/7/2021 12:55 PM EDT)  Can this be re-sent?

## 2021-06-10 ENCOUNTER — TELEPHONE (OUTPATIENT)
Dept: ORTHOPEDIC SURGERY | Age: 66
End: 2021-06-10

## 2021-06-10 NOTE — TELEPHONE ENCOUNTER
Appointment Request     Patient requesting earlier appointment: No  Appointment offered to patient: NO  Patient Contact Number: [phone]    PATIENT WOULD LIKE TO KNOW IF SHE CAN HAVE A VV APPT

## 2021-06-11 NOTE — TELEPHONE ENCOUNTER
Pt requesting VV. Called pt and LM to let her know that in person is preferred since she has not been in since February, but that I would request it. Asked pt to call back with any additional information as to her status, and to learn how to set up VV if allowed to proceed. Please advise.

## 2021-06-13 PROBLEM — Z00.00 ROUTINE GENERAL MEDICAL EXAMINATION AT A HEALTH CARE FACILITY: Status: RESOLVED | Noted: 2019-05-20 | Resolved: 2021-06-13

## 2021-06-19 ENCOUNTER — HOSPITAL ENCOUNTER (OUTPATIENT)
Dept: MAMMOGRAPHY | Age: 66
Discharge: HOME | End: 2021-06-19
Payer: COMMERCIAL

## 2021-06-19 VITALS — WEIGHT: 172 LBS | BODY MASS INDEX: 34.68 KG/M2 | HEIGHT: 59 IN

## 2021-06-19 DIAGNOSIS — Z12.31 VISIT FOR SCREENING MAMMOGRAM: ICD-10-CM

## 2021-06-19 PROCEDURE — 77063 BREAST TOMOSYNTHESIS BI: CPT

## 2021-06-22 NOTE — TELEPHONE ENCOUNTER
Email with VV info was sent 6/15/21 and pt called, LM to schedule.   Called pt again today to attempt to schedule and LM

## 2021-07-06 RX ORDER — LISINOPRIL 2.5 MG/1
TABLET ORAL
Qty: 30 TABLET | Refills: 0 | Status: SHIPPED | OUTPATIENT
Start: 2021-07-06 | End: 2021-08-18

## 2021-07-23 ENCOUNTER — TELEPHONE (OUTPATIENT)
Dept: INTERNAL MEDICINE CLINIC | Age: 66
End: 2021-07-23

## 2021-07-23 RX ORDER — HYDROXYZINE PAMOATE 25 MG/1
25 CAPSULE ORAL 3 TIMES DAILY PRN
Qty: 30 CAPSULE | Refills: 0 | Status: SHIPPED | OUTPATIENT
Start: 2021-07-23 | End: 2021-08-06

## 2021-07-23 RX ORDER — BUPROPION HYDROCHLORIDE 150 MG/1
150 TABLET ORAL EVERY MORNING
Qty: 30 TABLET | Refills: 3 | Status: SHIPPED | OUTPATIENT
Start: 2021-07-23 | End: 2021-11-23

## 2021-07-23 NOTE — TELEPHONE ENCOUNTER
Sohan called in & started crying before saying anything. I assured her everything was ok & how could I help her. She asked to speak with you. I told her I could send you a message & she said please do. She said she had a lot going on in her life & you would know what she was talking about. She asked if you could call her something in that would help, not to strong enough, but something to help please. She did not know the name of the meds. Thank you.

## 2021-08-18 RX ORDER — LISINOPRIL 2.5 MG/1
TABLET ORAL
Qty: 30 TABLET | Refills: 0 | Status: SHIPPED | OUTPATIENT
Start: 2021-08-18 | End: 2021-09-20

## 2021-08-30 NOTE — TELEPHONE ENCOUNTER
Requested Prescriptions     Pending Prescriptions Disp Refills    atorvastatin (LIPITOR) 10 MG tablet [Pharmacy Med Name: ATORVASTATIN 10 MG TABLET] 90 tablet 3     Sig: TAKE ONE TABLET BY MOUTH DAILY                  Last Office Visit: 3/29/2021     Next Office Visit: 9/30/2021      Last Labs:lipids 5/14/2021

## 2021-08-31 ENCOUNTER — TELEPHONE (OUTPATIENT)
Dept: INTERNAL MEDICINE CLINIC | Age: 66
End: 2021-08-31

## 2021-08-31 RX ORDER — ATORVASTATIN CALCIUM 10 MG/1
TABLET, FILM COATED ORAL
Qty: 90 TABLET | Refills: 3 | Status: SHIPPED | OUTPATIENT
Start: 2021-08-31 | End: 2022-10-05 | Stop reason: SDUPTHER

## 2021-08-31 NOTE — TELEPHONE ENCOUNTER
Please schedule with Renard Amanuel to help with medication management as she has not found improvement of symptoms on treatment options so far. And then please let her know. Thank you!

## 2021-08-31 NOTE — TELEPHONE ENCOUNTER
Pt says antidepressant is not helping   She has no interest in anything and doesn't know what to do   She would like to be contacted  [facility] to leave vm

## 2021-09-02 ENCOUNTER — TELEPHONE (OUTPATIENT)
Dept: INTERNAL MEDICINE CLINIC | Age: 66
End: 2021-09-02

## 2021-09-20 RX ORDER — LISINOPRIL 2.5 MG/1
TABLET ORAL
Qty: 30 TABLET | Refills: 0 | Status: SHIPPED | OUTPATIENT
Start: 2021-09-20 | End: 2021-10-25

## 2021-11-04 ENCOUNTER — TELEPHONE (OUTPATIENT)
Dept: ORTHOPEDIC SURGERY | Age: 66
End: 2021-11-04

## 2021-11-04 NOTE — TELEPHONE ENCOUNTER
Dear PCP Provider: [facility]) has partnered with [facility] to utilize predictive analytics to improve the care management for patients with arthritic knee pain. Utilizing claims data and patient visit features, we have developed an algorithmic risk score to determine which patient's quality of life may be most impacted to their knee pain. The selection criteria for this  program are: 1) risk score greater than .85; 2) existing [address] [place], [place] [address] patient; and 3) seen for knee pain in the past 3 years. Based upon the predictive analytics risk score  program, your patient has a risk score of greater than . 85 (i.e. 85% probability in having their quality of life impacted by their knee pain). To assist with care management of your patient, a navigator will be contacting them to understand their knee pain status and to educate on the [address] Pain Program, including scheduling an appointment with a joint specialist or their PCP. If patient is established with a [place] [address] provider for their knee pain, we will follow up with the patient. If you feel this patient not appropriate to be contacted given other medical reasons, please reply back to the navigator within 7 days with reason why not to be contacted. Otherwise, the navigator will follow up with you on the details of the patient encounter after the call.  Thank you for your support for this program.

## 2021-11-04 NOTE — TELEPHONE ENCOUNTER
Requested Prescriptions     Pending Prescriptions Disp Refills    metoprolol tartrate (LOPRESSOR) 25 MG tablet [Pharmacy Med Name: METOPROLOL TARTRATE 25 MG TAB] 135 tablet 3     Sig: TAKE ONE TABLET BY MOUTH EVERY MORNING AND TAKE 1/2 TABLET BY MOUTH EVERY EVENING                  Last Office Visit: 3/29/2021     Next Office Visit: 11/11/2021

## 2021-11-11 ENCOUNTER — OFFICE VISIT (OUTPATIENT)
Dept: CARDIOLOGY CLINIC | Age: 66
End: 2021-11-11
Payer: COMMERCIAL

## 2021-11-11 ENCOUNTER — TELEPHONE (OUTPATIENT)
Dept: ORTHOPEDIC SURGERY | Age: 66
End: 2021-11-11

## 2021-11-11 VITALS
WEIGHT: 173.2 LBS | HEART RATE: 80 BPM | BODY MASS INDEX: 34.98 KG/M2 | DIASTOLIC BLOOD PRESSURE: 76 MMHG | SYSTOLIC BLOOD PRESSURE: 128 MMHG

## 2021-11-11 DIAGNOSIS — I10 HTN (HYPERTENSION), BENIGN: ICD-10-CM

## 2021-11-11 DIAGNOSIS — R00.2 PALPITATIONS: ICD-10-CM

## 2021-11-11 DIAGNOSIS — I42.0 DILATED CARDIOMYOPATHY (HCC): Primary | ICD-10-CM

## 2021-11-11 PROCEDURE — 93000 ELECTROCARDIOGRAM COMPLETE: CPT | Performed by: INTERNAL MEDICINE

## 2021-11-11 PROCEDURE — 99214 OFFICE O/P EST MOD 30 MIN: CPT | Performed by: INTERNAL MEDICINE

## 2021-11-11 RX ORDER — METOPROLOL SUCCINATE 25 MG/1
25 TABLET, EXTENDED RELEASE ORAL DAILY
Qty: 90 TABLET | Refills: 3 | Status: SHIPPED | OUTPATIENT
Start: 2021-11-11

## 2021-11-11 NOTE — PROGRESS NOTES
Subjective:      Patient ID: Kristen Marques is a 74 y.o. female. CC:  Palpitations. Chest pain,     HPI Zoey Caballero had dizziness and palpitations and takes one whole pill in am.  No chest pain has some heart burn. No Known Allergies     Social History     Socioeconomic History    Marital status:      Spouse name: Not on file    Number of children: Not on file    Years of education: Not on file    Highest education level: Not on file   Occupational History    Not on file   Tobacco Use    Smoking status: Former Smoker     Packs/day: 0.50     Years: 41.00     Pack years: 20.50     Types: Cigarettes     Start date: 2013     Quit date: 2013     Years since quittin.2    Smokeless tobacco: Never Used   Vaping Use    Vaping Use: Never used   Substance and Sexual Activity    Alcohol use: No    Drug use: No    Sexual activity: Not Currently     Partners: Male     Birth control/protection: Condom   Other Topics Concern    Not on file   Social History Narrative    Not on file     Social Determinants of Health     Financial Resource Strain:     Difficulty of Paying Living Expenses: Not on file   Food Insecurity:     Worried About Running Out of Food in the Last Year: Not on file    [place] of Food in the Last Year: Not on file   Transportation Needs:     Lack of Transportation (Medical): Not on file    Lack of Transportation (Non-Medical):  Not on file   Physical Activity:     Days of Exercise per Week: Not on file    Minutes of Exercise per Session: Not on file   Stress:     Feeling of Stress : Not on file   Social Connections:     Frequency of Communication with Friends and Family: Not on file    Frequency of Social Gatherings with Friends and Family: Not on file    Attends Christian Services: Not on file    Active Member of Clubs or Organizations: Not on file    Attends Club or Organization Meetings: Not on file    Marital Status: Not on file   Intimate Partner Violence:     Fear of Current or Ex-Partner: Not on file    Emotionally Abused: Not on file    Physically Abused: Not on file    Sexually Abused: Not on file   Housing Stability:     Unable to Pay for Housing in the Last Year: Not on file    Number of Places Lived in the Last Year: Not on file    Unstable Housing in the Last Year: Not on file        Patient has a family history includes Breast Cancer in her sister and sister; Cancer in her father; Colon Cancer in her brother; Coronary Art Dis in an other family member; Hypertension in an other family member; Other in an other family member. Patient  has a past medical history of Cardiomyopathy ([facility] [address] Chronic back pain, Colon polyp, DDD (degenerative disc disease), lumbar, Degenerative arthritis of cervical spine, Depression, Essential hypertension, benign, Hyperlipidemia, Osteopenia, and Varicose vein. Current Outpatient Medications   Medication Sig Dispense Refill    metoprolol tartrate (LOPRESSOR) 25 MG tablet TAKE ONE TABLET BY MOUTH EVERY MORNING AND TAKE 1/2 TABLET BY MOUTH EVERY EVENING 135 tablet 3    lisinopril (PRINIVIL;ZESTRIL) 2.5 MG tablet TAKE ONE TABLET BY MOUTH DAILY 90 tablet 1    atorvastatin (LIPITOR) 10 MG tablet TAKE ONE TABLET BY MOUTH DAILY 90 tablet 3    sertraline (ZOLOFT) 50 MG tablet TAKE ONE TABLET BY MOUTH DAILY 90 tablet 0    buPROPion (WELLBUTRIN XL) 150 MG extended release tablet Take 1 tablet by mouth every morning 30 tablet 3    etodolac (LODINE) 400 MG tablet Take 1 tablet by mouth 2 times daily 60 tablet 3    methylPREDNISolone (MEDROL, GENEVA,) 4 MG tablet By mouth.  1 kit 0    cyclobenzaprine (FLEXERIL) 10 MG tablet Take 1 tablet by mouth nightly as needed for Muscle spasms 30 tablet 1    pantoprazole (PROTONIX) 20 MG tablet Take 1 tablet by mouth daily 30 tablet 3    ibuprofen (IBU) 800 MG tablet Take 1 tablet by mouth every 8 hours as needed for Pain 20 tablet 0    Chlorphen-Phenyleph-APAP (CORICIDIN D COLD/FLU/SINUS) 2-5-325 MG TABS Take 2 tablets by mouth every 6 hours 168 tablet 0    calcium carbonate 600 MG TABS tablet Take 1 tablet by mouth daily      ibuprofen (ADVIL;MOTRIN) 100 MG/5ML suspension Take 4 tsp (20 mL po) q8h prn laryngitis and sore throat 300 mL 0    aspirin 81 MG chewable tablet Take 1 tablet by mouth daily. 30 tablet 2    baclofen (LIORESAL) 10 MG tablet TAKE ONE TABLET BY MOUTH THREE TIMES A DAY (Patient not taking: Reported on 11/11/2021) 21 tablet 0     No current facility-administered medications for this visit. Vitals  Weight: 173 lb 3.2 oz (78.6 kg)  Blood Pressure: BP: (128)/(76)    Pulse: 80       Review of Systems   Constitutional: Negative. HENT: Negative. Eyes: Negative. Respiratory: Negative. Cardiovascular: Negative. Gastrointestinal: Negative. Endocrine: Negative. Genitourinary: Negative. Musculoskeletal: Negative. Skin: Negative. Allergic/Immunologic: Negative. Neurological: Negative. Hematological: Negative. Psychiatric/Behavioral: Negative. Objective:   Physical Exam   Nursing note and vitals reviewed. Constitutional: She is oriented to person, place, and time. She appears well-developed and well-nourished. HENT:   Head: Normocephalic and atraumatic. Eyes: Conjunctivae are normal. Pupils are equal, round, and reactive to light. Neck: Normal range of motion. Neck supple. No JVD present. No tracheal deviation present. No thyromegaly present. Cardiovascular: Normal rate, regular rhythm, normal heart sounds and intact distal pulses. Exam reveals no gallop and no friction rub. No murmur heard. Pulmonary/Chest: Effort normal. No respiratory distress. She has no wheezes. She has no rales. She exhibits no tenderness. Abdominal: Soft. Bowel sounds are normal. She exhibits no distension and no mass. There is no tenderness. There is no rebound and no guarding.    Musculoskeletal: She exhibits no edema and no tenderness. Lymphadenopathy:     She has no cervical adenopathy. Neurological: She is alert and oriented to person, place, and time. No cranial nerve deficit. Coordination normal.   Skin: Skin is warm and dry. No rash noted. No erythema. No pallor. Psychiatric: She has a normal mood and affect. Her behavior is normal. Judgment and thought content normal.       Assessment:       Diagnosis Orders   1. Dilated cardiomyopathy ([address]  EKG 12 lead   2. HTN (hypertension), benign     3. Palpitations             Plan:      Dizziness and palpitations. Resolved as She is taking toprol xl 25 mg daily in am and 12.5 mg in the evening to treat palpitations. Chest and shoulder pain:  MPI was normal in 5/2018. history of takosubu CMP from 1 2014 that has since resolved. Claudication:  She quit smoking and has back pain with scoliosis and leg pain was getting sclerotherapy for LE varicosities. I recommended seeing a vascular surgeon.    Acid reflux: takes tums

## 2021-11-11 NOTE — TELEPHONE ENCOUNTER
LVM for patient regarding the [address] [place] [address] Orthopedic joint pain program. Patient can call [phone] for more information or to schedule an appointment with a joint pain specialist.

## 2021-11-15 ENCOUNTER — CLINICAL DOCUMENTATION (OUTPATIENT)
Dept: OTHER | Age: 66
End: 2021-11-15

## 2021-11-23 RX ORDER — BUPROPION HYDROCHLORIDE 150 MG/1
TABLET ORAL
Qty: 30 TABLET | Refills: 3 | Status: SHIPPED | OUTPATIENT
Start: 2021-11-23 | End: 2022-04-21 | Stop reason: SDUPTHER

## 2022-01-27 ENCOUNTER — TELEPHONE (OUTPATIENT)
Dept: VASCULAR SURGERY | Age: 67
End: 2022-01-27

## 2022-01-27 NOTE — TELEPHONE ENCOUNTER
Patient called and scheduled a new patient appointment for 11:15 on 2/3/22. New patient Dr Willard Tapia referral 2nd opinion family history of PAD, leg pain. Please call patient with any testing needed prior to appointment.  [phone]

## 2022-01-31 DIAGNOSIS — I73.9 PAD (PERIPHERAL ARTERY DISEASE) (HCC): Primary | ICD-10-CM

## 2022-01-31 NOTE — TELEPHONE ENCOUNTER
Order placed. Spoke with pt and instructed her to have testing completed prior to appt. Pt verbalized understanding.

## 2022-02-02 ENCOUNTER — HOSPITAL ENCOUNTER (OUTPATIENT)
Dept: VASCULAR LAB | Age: 67
Discharge: HOME | End: 2022-02-02

## 2022-02-07 DIAGNOSIS — I87.2 VENOUS INSUFFICIENCY OF BOTH LOWER EXTREMITIES: Primary | ICD-10-CM

## 2022-02-07 DIAGNOSIS — M79.606 PAIN OF LOWER EXTREMITY, UNSPECIFIED LATERALITY: ICD-10-CM

## 2022-03-31 ENCOUNTER — OFFICE VISIT (OUTPATIENT)
Dept: ORTHOPEDIC SURGERY | Age: 67
End: 2022-03-31
Payer: COMMERCIAL

## 2022-03-31 VITALS — WEIGHT: 173 LBS | BODY MASS INDEX: 34.88 KG/M2 | HEIGHT: 59 IN

## 2022-03-31 DIAGNOSIS — S86.912A STRAIN OF BOTH KNEES, INITIAL ENCOUNTER: ICD-10-CM

## 2022-03-31 DIAGNOSIS — S86.911A STRAIN OF BOTH KNEES, INITIAL ENCOUNTER: ICD-10-CM

## 2022-03-31 DIAGNOSIS — M25.561 PAIN IN BOTH KNEES, UNSPECIFIED CHRONICITY: ICD-10-CM

## 2022-03-31 DIAGNOSIS — M17.0 PRIMARY OSTEOARTHRITIS OF BOTH KNEES: ICD-10-CM

## 2022-03-31 DIAGNOSIS — M25.562 PAIN IN BOTH KNEES, UNSPECIFIED CHRONICITY: ICD-10-CM

## 2022-03-31 PROCEDURE — 99214 OFFICE O/P EST MOD 30 MIN: CPT | Performed by: INTERNAL MEDICINE

## 2022-03-31 RX ORDER — LISINOPRIL 2.5 MG/1
TABLET ORAL
Qty: 90 TABLET | Refills: 0 | Status: SHIPPED | OUTPATIENT
Start: 2022-03-31 | End: 2022-07-13

## 2022-03-31 NOTE — PROGRESS NOTES
Chief Complaint:   Chief Complaint   Patient presents with    Knee Pain     bilateral           History of Present Illness:       Patient is a 74 y.o. female presents with the above complaint. The symptoms began 1 yearsago and started without an injury. The patient describes a aching pain that does not radiate. The symptoms are constant  and are are worsening since the onset. 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 There are not mechanical symptoms that suggest meniscal injury. The patient denies subjective instability about the knee and denies new onset weakness of the lower extremity. Pain level 8    The patient denies a pattern of activity related swelling. Treatment to date: none. There is no prior history of knee trauma. There is no prior history of autoimmune disease, inflammatory arthropathy, or crystal arthropathy. Past Medical History:        Past Medical History:   Diagnosis Date    Cardiomyopathy ([address] 1/7/2014    Dx @ [facility].     Chronic back pain     Colon polyp     DDD (degenerative disc disease), lumbar 3/30/2015    Degenerative arthritis of cervical spine     Depression 12/16/2014    Essential hypertension, benign     Hyperlipidemia     Osteopenia     Varicose vein          Past Surgical History:   Procedure Laterality Date    COLONOSCOPY           Present Medications:         Current Outpatient Medications   Medication Sig Dispense Refill    lisinopril (PRINIVIL;ZESTRIL) 2.5 MG tablet TAKE ONE TABLET BY MOUTH DAILY 90 tablet 0    buPROPion (WELLBUTRIN XL) 150 MG extended release tablet TAKE ONE TABLET BY MOUTH EVERY MORNING 30 tablet 3    metoprolol succinate (TOPROL XL) 25 MG extended release tablet Take 1 tablet by mouth daily 90 tablet 3    metoprolol tartrate (LOPRESSOR) 25 MG tablet TAKE ONE TABLET BY MOUTH EVERY MORNING AND TAKE 1/2 TABLET BY MOUTH EVERY EVENING 135 tablet 3    atorvastatin (LIPITOR) 10 MG tablet TAKE ONE TABLET BY MOUTH DAILY 90 tablet 3    sertraline (ZOLOFT) 50 MG tablet TAKE ONE TABLET BY MOUTH DAILY 90 tablet 0    etodolac (LODINE) 400 MG tablet Take 1 tablet by mouth 2 times daily 60 tablet 3    methylPREDNISolone (MEDROL, GENEVA,) 4 MG tablet By mouth. 1 kit 0    cyclobenzaprine (FLEXERIL) 10 MG tablet Take 1 tablet by mouth nightly as needed for Muscle spasms 30 tablet 1    pantoprazole (PROTONIX) 20 MG tablet Take 1 tablet by mouth daily 30 tablet 3    baclofen (LIORESAL) 10 MG tablet TAKE ONE TABLET BY MOUTH THREE TIMES A DAY (Patient not taking: Reported on 2021) 21 tablet 0    ibuprofen (IBU) 800 MG tablet Take 1 tablet by mouth every 8 hours as needed for Pain 20 tablet 0    Chlorphen-Phenyleph-APAP (CORICIDIN D COLD/FLU/SINUS) 2-5-325 MG TABS Take 2 tablets by mouth every 6 hours 168 tablet 0    calcium carbonate 600 MG TABS tablet Take 1 tablet by mouth daily      ibuprofen (ADVIL;MOTRIN) 100 MG/5ML suspension Take 4 tsp (20 mL po) q8h prn laryngitis and sore throat 300 mL 0    aspirin 81 MG chewable tablet Take 1 tablet by mouth daily. 30 tablet 2     No current facility-administered medications for this visit.          Allergies:      No Known Allergies     Social History:         Social History     Socioeconomic History    Marital status:      Spouse name: Not on file    Number of children: Not on file    Years of education: Not on file    Highest education level: Not on file   Occupational History    Not on file   Tobacco Use    Smoking status: Former Smoker     Packs/day: 0.50     Years: 41.00     Pack years: 20.50     Types: Cigarettes     Start date: 2013     Quit date: 2013     Years since quittin.6    Smokeless tobacco: Never Used   Vaping Use    Vaping Use: Never used   Substance and Sexual Activity    Alcohol use: No    Drug use: No    Sexual activity: Not Currently     Partners: Male     Birth control/protection: Condom   Other Topics Concern    Not on file   Social History Narrative    Not on file     Social Determinants of Health     Financial Resource Strain:     Difficulty of Paying Living Expenses: Not on file   Food Insecurity:     Worried About Running Out of Food in the Last Year: Not on file    [place] of Food in the Last Year: Not on file   Transportation Needs:     Lack of Transportation (Medical): Not on file    Lack of Transportation (Non-Medical): Not on file   Physical Activity:     Days of Exercise per Week: Not on file    Minutes of Exercise per Session: Not on file   Stress:     Feeling of Stress : Not on file   Social Connections:     Frequency of Communication with Friends and Family: Not on file    Frequency of Social Gatherings with Friends and Family: Not on file    Attends Evangelical Services: Not on file    Active Member of [address] [place] [address] or Organizations: Not on file    Attends Club or Organization Meetings: Not on file    Marital Status: Not on file   Intimate Partner Violence:     Fear of Current or Ex-Partner: Not on file    Emotionally Abused: Not on file    Physically Abused: Not on file    Sexually Abused: Not on file   Housing Stability:     Unable to Pay for Housing in the Last Year: Not on file    Number of [place] in the Last Year: Not on file    Unstable Housing in the Last Year: Not on file        Review of Symptoms:    Pertinent items are noted in HPI    Review of systems reviewed from Patient History Form dated on today's date and   available in the patient's chart under the Media tab. Vital Signs: There were no vitals filed for this visit. General Exam:     Constitutional: Patient is adequately groomed with no evidence of malnutrition  Mental Status: The patient is oriented to time, place and person.   The patient's mood and affect are appropriate. Vascular: Examination reveals no swelling or calf tenderness. Peripheral pulses are palpable and 2+. Lymphatics: no lymphadenopathy of the inguinal region or lower extremity      Physical Exam: bilateral knee      Primary Exam:    Inspection: No deformity atrophy appreciable effusion      Palpation: No focal tenderness      Range of Motion: Full range and symmetric      Strength: Normal with SLR      Special Tests:   Lachman test negative, collateral ligament stressing stable, anterior/posterior drawer negative, medial and lateral [facility] testing negative, patella femoral provacative Negative      Skin: There are no rashes, ulcerations or lesions. Gait: Nonantalgic      Reflex intact lower     Additional Comments:        Additional Examinations:           Neurolgic -Light touch sensation and manual muscle testing normal L2-S1. No fasiculations. Pattella tendon and Achilles tendon reflexes +2 bilaterally. Seated SLR negative           Office Imaging Results/Procedures PerformedToday:      Radiology:      X-rays obtained and reviewed in office:   Views bilat   Location bilateral   Impression tibiofemoral joints have a normal radiographic appearance bilaterally patellofemoral joint is anatomic much projection reveals normal alignment only grade 1 tricompartmental degenerative change       Office Procedures:     Orders Placed This Encounter   Procedures    XR KNEE RIGHT (MIN 4 VIEWS)     Standing Status:   Future     Number of Occurrences:   1     Standing Expiration Date:   3/30/2023     Order Specific Question:   Reason for exam:     Answer:   pain    XR KNEE LEFT (MIN 4 VIEWS)     Standing Status:   Future     Number of Occurrences:   1     Standing Expiration Date:   3/31/2023     Order Specific Question:   Reason for exam:     Answer:   pain           Other Outside Imaging and Testing Personally Reviewed:    XR KNEE LEFT (MIN 4 VIEWS)    Result Date: 3/31/2022  Radiology exam is complete. No Radiologist dictation. Please follow up with ordering provider. XR KNEE RIGHT (MIN 4 VIEWS)    Result Date: 3/31/2022  Radiology exam is complete. No Radiologist dictation. Please follow up with ordering provider. Assessment   Impression: . Encounter Diagnoses   Name Primary?  Primary osteoarthritis of both knees     Strain of both knees, initial encounter     Pain in both knees, unspecified chronicity               Plan:     Knee conditioning home exercises  Elastic compression bilateral knees with ADLs  Trial of Voltaren gel and recommend etodolac daily with GI precaution repeat in the evening as necessary  She is not interested in a Medrol Dosepak as a treatment option  Intra-articular ultrasound guided steroid injection only for escalating pain levels  Consider MRI evaluation bilateral knees as needed  Consider her candidate for hyaluronic acid therapy as needed  Consider formal course of PT as needed        The nature of the finding, probable diagnosis and likely treatment was thoroughly discussed with the patient. The options, risks, complications, alternative treatment as well as some of the differential diagnosis was discussed. The patient was thoroughly informed and all questions were answered. the patient indicated understanding and satisfaction with the discussion. Orders:        Orders Placed This Encounter   Procedures    XR KNEE RIGHT (MIN 4 VIEWS)     Standing Status:   Future     Number of Occurrences:   1     Standing Expiration Date:   3/30/2023     Order Specific Question:   Reason for exam:     Answer:   pain    XR KNEE LEFT (MIN 4 VIEWS)     Standing Status:   Future     Number of Occurrences:   1     Standing Expiration Date:   3/31/2023     Order Specific Question:   Reason for exam:     Answer:   pain           Disclaimer: \"This note was dictated with voice recognition software. Though review and correction are routine, we apologize for any errors. \"

## 2022-04-19 ENCOUNTER — TELEPHONE (OUTPATIENT)
Dept: FAMILY MEDICINE CLINIC | Age: 67
End: 2022-04-19

## 2022-04-19 NOTE — TELEPHONE ENCOUNTER
----- Message from [employer] sent at 4/19/2022  8:31 AM EDT -----  Subject: Appointment Request    Reason for Call: Urgent Cough Cold    QUESTIONS  Type of Appointment? Established Patient  Reason for appointment request? No appointments available during search  Additional Information for Provider? Patient has symptoms x three days   cold cough chills headached has the covid vaccine. chest congestion   fatigue been off work for two days will need note. Wanted to send mess   first to Augusto Pope see if can be seen with her. ? cb pt  ---------------------------------------------------------------------------  --------------  CALL BACK INFO  What is the best way for the office to contact you? OK to leave message on   voicemail  Preferred Call Back Phone Number? [phone]  ---------------------------------------------------------------------------  --------------  SCRIPT ANSWERS  Relationship to Patient? Self  Are you currently unable to finish sentences due to any difficulty   breathing? No  Are you unable to swallow liquids? No  Are you having fevers (100.4 or greater), chills, or sweats? Yes   Have you been diagnosed with, awaiting test results for, or told that you   are suspected of having COVID-19 (Coronavirus)? (If patient has tested   negative or was tested as a requirement for work, school, or travel and   not based on symptoms, answer no)? No  Within the past 10 days have you developed any of the following symptoms   (answer no if symptoms have been present longer than 10 days or began   more than 10 days ago)? Fever or Chills, Cough, Shortness of breath or   difficulty breathing, Loss of taste or smell, Sore throat, Nasal   congestion, Sneezing or runny nose, Fatigue or generalized body aches   (answer no if pain is specific to a body part e.g. back pain), Diarrhea,   Headache?  Yes

## 2022-04-20 ENCOUNTER — TELEPHONE (OUTPATIENT)
Dept: ORTHOPEDIC SURGERY | Age: 67
End: 2022-04-20

## 2022-04-20 RX ORDER — METHYLPREDNISOLONE 4 MG/1
TABLET ORAL
Qty: 1 KIT | Refills: 0 | Status: SHIPPED | OUTPATIENT
Start: 2022-04-20 | End: 2022-05-09 | Stop reason: ALTCHOICE

## 2022-04-20 NOTE — TELEPHONE ENCOUNTER
General Question     Subject: PATIENT WONDERING WHAT SHE CAN TAKE FOR THE PAIN.   Patient: Darrian Hernandez  Contact Number: [phone]

## 2022-04-20 NOTE — PROGRESS NOTES
Patient call form answering service    Increase in LBP no leg pain or neuro sx  Plan:MDP and arrange for MRI as prior recommendation from 2/22 OV

## 2022-04-21 RX ORDER — BUPROPION HYDROCHLORIDE 150 MG/1
TABLET ORAL
Qty: 30 TABLET | Refills: 3 | Status: SHIPPED | OUTPATIENT
Start: 2022-04-21 | End: 2022-05-09 | Stop reason: DRUGHIGH

## 2022-04-21 NOTE — TELEPHONE ENCOUNTER
LVM for pt to see how she is doing and advise her that steroid pack was called in to [address] [place] [address] on [id number] [facility] pt to begin steroid pack and use local measures to help control pain (i.e. ice, heat, gentle stretches if tolerable, etc). Asked for callback to let us know status. Pt will need to schedule appt to F/U.

## 2022-05-04 ENCOUNTER — TELEPHONE (OUTPATIENT)
Dept: FAMILY MEDICINE CLINIC | Age: 67
End: 2022-05-04

## 2022-05-04 NOTE — TELEPHONE ENCOUNTER
Pt called to say that her antidepressant is not working anymore and she doesn't feel like getting out of bed in the morning. Can the dosage be increased? Please call carlos.     LOV 05/14/2021

## 2022-05-09 ENCOUNTER — OFFICE VISIT (OUTPATIENT)
Dept: FAMILY MEDICINE CLINIC | Age: 67
End: 2022-05-09
Payer: COMMERCIAL

## 2022-05-09 VITALS
SYSTOLIC BLOOD PRESSURE: 120 MMHG | WEIGHT: 175 LBS | HEART RATE: 64 BPM | TEMPERATURE: 97.5 F | OXYGEN SATURATION: 99 % | BODY MASS INDEX: 35.35 KG/M2 | DIASTOLIC BLOOD PRESSURE: 60 MMHG

## 2022-05-09 DIAGNOSIS — M15.9 PRIMARY OSTEOARTHRITIS INVOLVING MULTIPLE JOINTS: ICD-10-CM

## 2022-05-09 DIAGNOSIS — I10 ESSENTIAL HYPERTENSION, BENIGN: ICD-10-CM

## 2022-05-09 DIAGNOSIS — F33.2 SEVERE EPISODE OF RECURRENT MAJOR DEPRESSIVE DISORDER, WITHOUT PSYCHOTIC FEATURES (HCC): Primary | ICD-10-CM

## 2022-05-09 DIAGNOSIS — I42.0 DILATED CARDIOMYOPATHY (HCC): ICD-10-CM

## 2022-05-09 DIAGNOSIS — E66.01 CLASS 2 SEVERE OBESITY DUE TO EXCESS CALORIES WITH SERIOUS COMORBIDITY AND BODY MASS INDEX (BMI) OF 35.0 TO 35.9 IN ADULT (HCC): ICD-10-CM

## 2022-05-09 PROCEDURE — 99215 OFFICE O/P EST HI 40 MIN: CPT | Performed by: NURSE PRACTITIONER

## 2022-05-09 RX ORDER — BUPROPION HYDROCHLORIDE 150 MG/1
TABLET ORAL
Qty: 60 TABLET | Refills: 3 | Status: SHIPPED | OUTPATIENT
Start: 2022-05-09 | End: 2022-08-24

## 2022-05-09 RX ORDER — ETODOLAC 500 MG/1
500 TABLET, FILM COATED ORAL 2 TIMES DAILY
Qty: 60 TABLET | Refills: 3 | Status: SHIPPED | OUTPATIENT
Start: 2022-05-09 | End: 2022-09-22

## 2022-05-09 ASSESSMENT — ENCOUNTER SYMPTOMS
COUGH: 0
NAUSEA: 0
BLOOD IN STOOL: 0
COLOR CHANGE: 0
CHEST TIGHTNESS: 0
VOMITING: 0
EYE ITCHING: 0
BACK PAIN: 1
SHORTNESS OF BREATH: 0
SINUS PRESSURE: 0
RHINORRHEA: 0
DIARRHEA: 0
CONSTIPATION: 0
EYE REDNESS: 0
WHEEZING: 0
ABDOMINAL PAIN: 0
SORE THROAT: 0

## 2022-05-09 NOTE — LETTER
[address] [address] [place]  Phone: [phone]  Fax: [phone]    Sheron President, HEATHER Navarro CNP        May 9, 2022     Patient: Ashlie Mccoy   YOB: 1955   Date of Visit: 5/9/2022       To Whom It May Concern: It is my medical opinion that Eleonora Ana was seen in the office 5/9/22. If you have any questions or concerns, please don't hesitate to call.     Sincerely,        Sheron President, HEATHER Navarro CNP

## 2022-05-09 NOTE — ASSESSMENT & PLAN NOTE
Patient struggles to get out of bed daily and dreads going into work. She has not interest in doing anything. Will increase Wellbutrin 300 mg daily. Encouraged patient to meet with Dr. Antonio Lopez in conjunction with medication increase. Patient tearful during visit. Encouraged patient to reach out to her HR department to inquire about possible FMLA to focus on her mental health.

## 2022-05-09 NOTE — PROGRESS NOTES
Kenia Mares (:  1955) is a 74 y.o. female,Established patient, here for evaluation of the following chief complaint(s):  Medication Check and Other (always tired no energy or urge to do daily activites)      ASSESSMENT/PLAN:  1. Severe episode of recurrent major depressive disorder, without psychotic features [facility])  Assessment & Plan:  Patient struggles to get out of bed daily and dreads going into work. She has not interest in doing anything. Will increase Wellbutrin 300 mg daily. Encouraged patient to meet with Dr. Belkys Watts in conjunction with medication increase. Patient tearful during visit. Encouraged patient to reach out to her HR department to inquire about possible FMLA to focus on her mental health. Orders:  -     buPROPion (WELLBUTRIN XL) 150 MG extended release tablet; TAKE TWO TABLETS BY MOUTH EVERY MORNING, Disp-60 tablet, R-3Normal  2. Class 2 severe obesity due to excess calories with serious comorbidity and body mass index (BMI) of 35.0 to 35.9 in adult [facility])  -     Hiwot Jaramillo DO, Bariatric Surgery, [facility]  3. Essential hypertension, benign  Assessment & Plan:  Stable, controlled  No changes  Continue current treatment plan     4. Dilated cardiomyopathy ([facility] [address]  Assessment & Plan:   Monitored by specialist- no acute findings meriting change in the plan  5. Primary osteoarthritis involving multiple joints  Assessment & Plan: Will increase etodolac to 500 mg BID for better pain management. Pt unable to do physical therapy due to financial cost.   Orders:  -     etodolac (LODINE) 500 MG tablet; Take 1 tablet by mouth 2 times daily, Disp-60 tablet, R-3Normal      No follow-ups on file. SUBJECTIVE/OBJECTIVE:  HPI   Patient presents today with complaints of feeling down and depressed. She states \"Im not happy with my life, Im sad\" She has no interest in doing anything. She is still working full time as a  and dreads going into work.  She is not able to retire due to financial reasons. Her children are talking about moving away and this upsets her. She is concerned about her weight gain and is unable to exercise much due to her chronic pain in her back and knees. She has spinal stenosis - back hurts all the time. She states her etodolac is not strong enough. Current Outpatient Medications   Medication Sig Dispense Refill    etodolac (LODINE) 500 MG tablet Take 1 tablet by mouth 2 times daily 60 tablet 3    buPROPion (WELLBUTRIN XL) 150 MG extended release tablet TAKE TWO TABLETS BY MOUTH EVERY MORNING 60 tablet 3    lisinopril (PRINIVIL;ZESTRIL) 2.5 MG tablet TAKE ONE TABLET BY MOUTH DAILY 90 tablet 0    metoprolol tartrate (LOPRESSOR) 25 MG tablet TAKE ONE TABLET BY MOUTH EVERY MORNING AND TAKE 1/2 TABLET BY MOUTH EVERY EVENING 135 tablet 3    atorvastatin (LIPITOR) 10 MG tablet TAKE ONE TABLET BY MOUTH DAILY 90 tablet 3    metoprolol succinate (TOPROL XL) 25 MG extended release tablet Take 1 tablet by mouth daily 90 tablet 3    calcium carbonate 600 MG TABS tablet Take 1 tablet by mouth daily       No current facility-administered medications for this visit. Review of Systems   Constitutional: Positive for fatigue. Negative for chills and fever. HENT: Negative for congestion, ear pain, postnasal drip, rhinorrhea, sinus pressure, sneezing and sore throat. Eyes: Negative for redness and itching. Respiratory: Negative for cough, chest tightness, shortness of breath and wheezing. Cardiovascular: Negative for chest pain and palpitations. Gastrointestinal: Negative for abdominal pain, blood in stool, constipation, diarrhea, nausea and vomiting. Endocrine: Negative for cold intolerance and heat intolerance. Genitourinary: Negative for difficulty urinating, dysuria, flank pain, frequency, hematuria and urgency. Musculoskeletal: Positive for back pain. Negative for arthralgias, joint swelling and myalgias.         Chronic back and knee pain    Skin: Negative for color change, pallor, rash and wound. Allergic/Immunologic: Negative for environmental allergies and food allergies. Neurological: Negative for dizziness, seizures, syncope, weakness, light-headedness, numbness and headaches. Hematological: Negative for adenopathy. Does not bruise/bleed easily. Psychiatric/Behavioral: Negative for confusion, sleep disturbance and suicidal ideas. The patient is nervous/anxious. The patient is not hyperactive. Vitals:    05/09/22 0902   BP: 120/60   Site: Right Upper Arm   Position: Sitting   Cuff Size: Medium Adult   Pulse: 64   Temp: 97.5 °F (36.4 °C)   SpO2: 99%   Weight: 175 lb (79.4 kg)       Physical Exam  Vitals reviewed. Constitutional:       Appearance: Normal appearance. She is well-developed. HENT:      Head: Normocephalic and atraumatic. Right Ear: Hearing normal.      Left Ear: Hearing normal.      Nose: No mucosal edema. Right Sinus: No maxillary sinus tenderness or frontal sinus tenderness. Left Sinus: No maxillary sinus tenderness or frontal sinus tenderness. Mouth/Throat: Tonsils: No tonsillar abscesses. Eyes:      Extraocular Movements: Extraocular movements intact. Pupils: Pupils are equal, round, and reactive to light. Cardiovascular:      Rate and Rhythm: Normal rate and regular rhythm. Pulses: Normal pulses. Heart sounds: Normal heart sounds. Pulmonary:      Effort: Pulmonary effort is normal.      Breath sounds: Normal breath sounds. Lymphadenopathy:      Head:      Right side of head: No submental, submandibular, tonsillar, preauricular, posterior auricular or occipital adenopathy. Left side of head: No submental, submandibular, tonsillar, preauricular, posterior auricular or occipital adenopathy. Skin:     General: Skin is warm and dry. Neurological:      Mental Status: She is alert and oriented to person, place, and time.    Psychiatric:         Mood and Affect: Mood normal. Affect is tearful. Behavior: Behavior normal.           On this date 5/9/2022 I have spent 45 minutes reviewing previous notes, test results and face to face with the patient discussing the diagnosis and importance of compliance with the treatment plan as well as documenting on the day of the visit. An electronic signature was used to authenticate this note.     --HEATHER Parkinson - CNP

## 2022-05-23 ENCOUNTER — TELEPHONE (OUTPATIENT)
Dept: ORTHOPEDIC SURGERY | Age: 67
End: 2022-05-23

## 2022-05-23 NOTE — TELEPHONE ENCOUNTER
General Question     Subject: PATIENT WOULD LIKE TO KNOW IF DR. LOONEY WILL GIVE HER SOME PAIN MEDICATION FOR HER KNEES. SHE IS HAVING BURNING SENSATION IN HER KNEES WHEN SHE WALKS. SHE IS HAVING PAIN IN HER LEFT HIP. SHE STATES HER PCP HAS HER ON [address] 400 MG. AND THAT DOES NOT SEEM TO HELP.     [address] [facility]    Patient:  Juan Sample  Contact Number: [phone]

## 2022-05-29 ENCOUNTER — HOSPITAL ENCOUNTER (EMERGENCY)
Age: 67
Discharge: HOME | End: 2022-05-29
Payer: COMMERCIAL

## 2022-05-29 ENCOUNTER — APPOINTMENT (OUTPATIENT)
Dept: CT IMAGING | Age: 67
End: 2022-05-29
Payer: COMMERCIAL

## 2022-05-29 VITALS
SYSTOLIC BLOOD PRESSURE: 122 MMHG | HEART RATE: 66 BPM | RESPIRATION RATE: 16 BRPM | WEIGHT: 174 LBS | HEIGHT: 59 IN | DIASTOLIC BLOOD PRESSURE: 67 MMHG | BODY MASS INDEX: 35.08 KG/M2 | OXYGEN SATURATION: 98 % | TEMPERATURE: 99.1 F

## 2022-05-29 DIAGNOSIS — R10.9 LEFT FLANK PAIN: ICD-10-CM

## 2022-05-29 DIAGNOSIS — N30.01 ACUTE CYSTITIS WITH HEMATURIA: ICD-10-CM
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
## 2022-05-29 DIAGNOSIS — N20.1 LEFT URETERAL STONE: Primary | ICD-10-CM

## 2022-05-29 LAB
A/G RATIO: 1.8 (ref 1.1–2.2)
ALBUMIN SERPL-MCNC: 4.8 G/DL (ref 3.4–5)
ALP BLD-CCNC: 85 U/L (ref 40–129)
ALT SERPL-CCNC: 31 U/L (ref 10–40)
ANION GAP SERPL CALCULATED.3IONS-SCNC: 11 MMOL/L (ref 3–16)
AST SERPL-CCNC: 29 U/L (ref 15–37)
BACTERIA: ABNORMAL /HPF
BASOPHILS ABSOLUTE: 0 K/UL (ref 0–0.2)
BASOPHILS RELATIVE PERCENT: 0.4 %
BILIRUB SERPL-MCNC: 0.6 MG/DL (ref 0–1)
BILIRUBIN URINE: ABNORMAL
BLOOD, URINE: ABNORMAL
BUN BLDV-MCNC: 27 MG/DL (ref 7–20)
CALCIUM SERPL-MCNC: 9.6 MG/DL (ref 8.3–10.6)
CHLORIDE BLD-SCNC: 101 MMOL/L (ref 99–110)
CLARITY: ABNORMAL
CO2: 27 MMOL/L (ref 21–32)
COLOR: YELLOW
CREAT SERPL-MCNC: 1.2 MG/DL (ref 0.6–1.2)
EOSINOPHILS ABSOLUTE: 0.1 K/UL (ref 0–0.6)
EOSINOPHILS RELATIVE PERCENT: 1.3 %
EPITHELIAL CELLS, UA: ABNORMAL /HPF (ref 0–5)
GFR AFRICAN AMERICAN: 54
GFR NON-AFRICAN AMERICAN: 45
GLUCOSE BLD-MCNC: 113 MG/DL (ref 70–99)
GLUCOSE URINE: NEGATIVE MG/DL
HCT VFR BLD CALC: 40 % (ref 36–48)
HEMOGLOBIN: 13.3 G/DL (ref 12–16)
KETONES, URINE: ABNORMAL MG/DL
LEUKOCYTE ESTERASE, URINE: ABNORMAL
LYMPHOCYTES ABSOLUTE: 2.3 K/UL (ref 1–5.1)
LYMPHOCYTES RELATIVE PERCENT: 24.9 %
MCH RBC QN AUTO: 29.9 PG (ref 26–34)
MCHC RBC AUTO-ENTMCNC: 33.3 G/DL (ref 31–36)
MCV RBC AUTO: 90 FL (ref 80–100)
MICROSCOPIC EXAMINATION: YES
MONOCYTES ABSOLUTE: 0.7 K/UL (ref 0–1.3)
MONOCYTES RELATIVE PERCENT: 7.7 %
NEUTROPHILS ABSOLUTE: 5.9 K/UL (ref 1.7–7.7)
NEUTROPHILS RELATIVE PERCENT: 65.7 %
NITRITE, URINE: POSITIVE
PDW BLD-RTO: 13.1 % (ref 12.4–15.4)
PH UA: 5.5 (ref 5–8)
PLATELET # BLD: 221 K/UL (ref 135–450)
PMV BLD AUTO: 8.3 FL (ref 5–10.5)
POTASSIUM REFLEX MAGNESIUM: 3.9 MMOL/L (ref 3.5–5.1)
PROTEIN UA: NEGATIVE MG/DL
RBC # BLD: 4.44 M/UL (ref 4–5.2)
RBC UA: ABNORMAL /HPF (ref 0–4)
SODIUM BLD-SCNC: 139 MMOL/L (ref 136–145)
SPECIFIC GRAVITY UA: >=1.03 (ref 1–1.03)
TOTAL PROTEIN: 7.5 G/DL (ref 6.4–8.2)
URINE TYPE: ABNORMAL
UROBILINOGEN, URINE: 0.2 E.U./DL
WBC # BLD: 9.1 K/UL (ref 4–11)
WBC UA: >100 /HPF (ref 0–5)

## 2022-05-29 PROCEDURE — 6360000002 HC RX W HCPCS: Performed by: NURSE PRACTITIONER

## 2022-05-29 PROCEDURE — 81001 URINALYSIS AUTO W/SCOPE: CPT

## 2022-05-29 PROCEDURE — 85025 COMPLETE CBC W/AUTO DIFF WBC: CPT

## 2022-05-29 PROCEDURE — 6370000000 HC RX 637 (ALT 250 FOR IP): Performed by: NURSE PRACTITIONER

## 2022-05-29 PROCEDURE — 99284 EMERGENCY DEPT VISIT MOD MDM: CPT

## 2022-05-29 PROCEDURE — 96375 TX/PRO/DX INJ NEW DRUG ADDON: CPT

## 2022-05-29 PROCEDURE — 80053 COMPREHEN METABOLIC PANEL: CPT

## 2022-05-29 PROCEDURE — 6370000000 HC RX 637 (ALT 250 FOR IP)

## 2022-05-29 PROCEDURE — 87077 CULTURE AEROBIC IDENTIFY: CPT

## 2022-05-29 PROCEDURE — 87186 SC STD MICRODIL/AGAR DIL: CPT

## 2022-05-29 PROCEDURE — 96374 THER/PROPH/DIAG INJ IV PUSH: CPT

## 2022-05-29 PROCEDURE — 87086 URINE CULTURE/COLONY COUNT: CPT

## 2022-05-29 PROCEDURE — 74176 CT ABD & PELVIS W/O CONTRAST: CPT

## 2022-05-29 RX ORDER — HYDROCODONE BITARTRATE AND ACETAMINOPHEN 5; 325 MG/1; MG/1
1 TABLET ORAL EVERY 6 HOURS PRN
Qty: 10 TABLET | Refills: 0 | Status: SHIPPED | OUTPATIENT
Start: 2022-05-29 | End: 2022-06-01

## 2022-05-29 RX ORDER — ONDANSETRON 2 MG/ML
INJECTION INTRAMUSCULAR; INTRAVENOUS
Status: DISCONTINUED
Start: 2022-05-29 | End: 2022-05-30 | Stop reason: HOSPADM

## 2022-05-29 RX ORDER — KETOROLAC TROMETHAMINE 10 MG/1
10 TABLET, FILM COATED ORAL EVERY 6 HOURS PRN
Qty: 20 TABLET | Refills: 0 | Status: SHIPPED | OUTPATIENT
Start: 2022-05-29 | End: 2022-09-22

## 2022-05-29 RX ORDER — TAMSULOSIN HYDROCHLORIDE 0.4 MG/1
0.4 CAPSULE ORAL DAILY
Qty: 5 CAPSULE | Refills: 0 | Status: SHIPPED | OUTPATIENT
Start: 2022-05-29 | End: 2022-09-22

## 2022-05-29 RX ORDER — MORPHINE SULFATE 4 MG/ML
4 INJECTION, SOLUTION INTRAMUSCULAR; INTRAVENOUS ONCE
Status: COMPLETED | OUTPATIENT
Start: 2022-05-29 | End: 2022-05-29

## 2022-05-29 RX ORDER — ONDANSETRON 4 MG/1
8 TABLET, ORALLY DISINTEGRATING ORAL ONCE
Status: COMPLETED | OUTPATIENT
Start: 2022-05-29 | End: 2022-05-29

## 2022-05-29 RX ORDER — ONDANSETRON 4 MG/1
TABLET, ORALLY DISINTEGRATING ORAL
Status: COMPLETED
Start: 2022-05-29 | End: 2022-05-29

## 2022-05-29 RX ORDER — KETOROLAC TROMETHAMINE 30 MG/ML
15 INJECTION, SOLUTION INTRAMUSCULAR; INTRAVENOUS ONCE
Status: COMPLETED | OUTPATIENT
Start: 2022-05-29 | End: 2022-05-29

## 2022-05-29 RX ORDER — ONDANSETRON 4 MG/1
4 TABLET, ORALLY DISINTEGRATING ORAL EVERY 8 HOURS PRN
Qty: 20 TABLET | Refills: 0 | Status: SHIPPED | OUTPATIENT
Start: 2022-05-29 | End: 2022-06-03

## 2022-05-29 RX ORDER — CIPROFLOXACIN 500 MG/1
500 TABLET, FILM COATED ORAL 2 TIMES DAILY
Qty: 14 TABLET | Refills: 0 | Status: SHIPPED | OUTPATIENT
Start: 2022-05-29 | End: 2022-06-05

## 2022-05-29 RX ORDER — CIPROFLOXACIN 500 MG/1
500 TABLET, FILM COATED ORAL ONCE
Status: COMPLETED | OUTPATIENT
Start: 2022-05-29 | End: 2022-05-29

## 2022-05-29 RX ORDER — ONDANSETRON 2 MG/ML
4 INJECTION INTRAMUSCULAR; INTRAVENOUS ONCE
Status: COMPLETED | OUTPATIENT
Start: 2022-05-29 | End: 2022-05-29

## 2022-05-29 RX ADMIN — KETOROLAC TROMETHAMINE 15 MG: 30 INJECTION, SOLUTION INTRAMUSCULAR at 19:57

## 2022-05-29 RX ADMIN — CIPROFLOXACIN 500 MG: 500 TABLET, FILM COATED ORAL at 21:46

## 2022-05-29 RX ADMIN — ONDANSETRON 4 MG: 2 INJECTION INTRAMUSCULAR; INTRAVENOUS at 19:24

## 2022-05-29 RX ADMIN — ONDANSETRON 8 MG: 4 TABLET, ORALLY DISINTEGRATING ORAL at 22:20

## 2022-05-29 RX ADMIN — MORPHINE SULFATE 4 MG: 4 INJECTION, SOLUTION INTRAMUSCULAR; INTRAVENOUS at 19:24

## 2022-05-29 ASSESSMENT — ENCOUNTER SYMPTOMS
COUGH: 0
WHEEZING: 0
SHORTNESS OF BREATH: 0
ABDOMINAL PAIN: 0
VOMITING: 0
BACK PAIN: 0
COLOR CHANGE: 0
DIARRHEA: 0
NAUSEA: 1

## 2022-05-29 ASSESSMENT — PAIN SCALES - GENERAL
PAINLEVEL_OUTOF10: 9
PAINLEVEL_OUTOF10: 7

## 2022-05-29 ASSESSMENT — PAIN - FUNCTIONAL ASSESSMENT: PAIN_FUNCTIONAL_ASSESSMENT: NONE - DENIES PAIN
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
## 2022-05-30 NOTE — ED NOTES
Pt became nauseated prior to d/c home,active emesis x1/Zofran 8mg PO given Emergently for nausea. Pt given glass of water and crackers to eat on the way home.      Saturnino Bustillo, LLOYD  24/18/84 5258

## 2022-06-01 LAB
ORGANISM: ABNORMAL
URINE CULTURE, ROUTINE: ABNORMAL

## 2022-06-02 NOTE — TELEPHONE ENCOUNTER
LVM for pt to call us back so we can see what is going on and if we can schedule her an appointment to further assist her in her plan of care.

## 2022-06-03 ENCOUNTER — OFFICE VISIT (OUTPATIENT)
Dept: BARIATRICS/WEIGHT MGMT | Age: 67
End: 2022-06-03

## 2022-06-03 ENCOUNTER — OFFICE VISIT (OUTPATIENT)
Dept: FAMILY MEDICINE CLINIC | Age: 67
End: 2022-06-03
Payer: COMMERCIAL

## 2022-06-03 VITALS
HEIGHT: 59 IN | BODY MASS INDEX: 35.28 KG/M2 | WEIGHT: 175 LBS | HEART RATE: 63 BPM | DIASTOLIC BLOOD PRESSURE: 83 MMHG | SYSTOLIC BLOOD PRESSURE: 107 MMHG

## 2022-06-03 VITALS
SYSTOLIC BLOOD PRESSURE: 122 MMHG | WEIGHT: 175 LBS | HEART RATE: 61 BPM | BODY MASS INDEX: 35.35 KG/M2 | DIASTOLIC BLOOD PRESSURE: 82 MMHG | TEMPERATURE: 97 F | OXYGEN SATURATION: 97 %

## 2022-06-03 DIAGNOSIS — S39.012S STRAIN OF LUMBAR REGION, SEQUELA: ICD-10-CM

## 2022-06-03 DIAGNOSIS — E66.01 MORBID OBESITY DUE TO EXCESS CALORIES (HCC): ICD-10-CM

## 2022-06-03 DIAGNOSIS — Z01.818 PREOPERATIVE CLEARANCE: ICD-10-CM

## 2022-06-03 PROBLEM — S39.012A LUMBAR STRAIN: Status: ACTIVE | Noted: 2022-06-03

## 2022-06-03 PROCEDURE — 1123F ACP DISCUSS/DSCN MKR DOCD: CPT | Performed by: NURSE PRACTITIONER

## 2022-06-03 PROCEDURE — 99999 PR OFFICE/OUTPT VISIT,PROCEDURE ONLY: CPT | Performed by: FAMILY MEDICINE

## 2022-06-03 PROCEDURE — 99214 OFFICE O/P EST MOD 30 MIN: CPT | Performed by: NURSE PRACTITIONER

## 2022-06-03 ASSESSMENT — PATIENT HEALTH QUESTIONNAIRE - PHQ9
6. FEELING BAD ABOUT YOURSELF - OR THAT YOU ARE A FAILURE OR HAVE LET YOURSELF OR YOUR FAMILY DOWN: 0
3. TROUBLE FALLING OR STAYING ASLEEP: 0
SUM OF ALL RESPONSES TO PHQ QUESTIONS 1-9: 3
SUM OF ALL RESPONSES TO PHQ QUESTIONS 1-9: 1
SUM OF ALL RESPONSES TO PHQ QUESTIONS 1-9: 1
SUM OF ALL RESPONSES TO PHQ QUESTIONS 1-9: 3
SUM OF ALL RESPONSES TO PHQ QUESTIONS 1-9: 3
10. IF YOU CHECKED OFF ANY PROBLEMS, HOW DIFFICULT HAVE THESE PROBLEMS MADE IT FOR YOU TO DO YOUR WORK, TAKE CARE OF THINGS AT HOME, OR GET ALONG WITH OTHER PEOPLE: 0
7. TROUBLE CONCENTRATING ON THINGS, SUCH AS READING THE NEWSPAPER OR WATCHING TELEVISION: 0
8. MOVING OR SPEAKING SO SLOWLY THAT OTHER PEOPLE COULD HAVE NOTICED. OR THE OPPOSITE, BEING SO FIGETY OR RESTLESS THAT YOU HAVE BEEN MOVING AROUND A LOT MORE THAN USUAL: 0
SUM OF ALL RESPONSES TO PHQ9 QUESTIONS 1 & 2: 1
9. THOUGHTS THAT YOU WOULD BE BETTER OFF DEAD, OR OF HURTING YOURSELF: 0
7. TROUBLE CONCENTRATING ON THINGS, SUCH AS READING THE NEWSPAPER OR WATCHING TELEVISION: 0
SUM OF ALL RESPONSES TO PHQ9 QUESTIONS 1 & 2: 1
4. FEELING TIRED OR HAVING LITTLE ENERGY: 1
1. LITTLE INTEREST OR PLEASURE IN DOING THINGS: 1
SUM OF ALL RESPONSES TO PHQ QUESTIONS 1-9: 1
2. FEELING DOWN, DEPRESSED OR HOPELESS: 0
SUM OF ALL RESPONSES TO PHQ QUESTIONS 1-9: 2
1. LITTLE INTEREST OR PLEASURE IN DOING THINGS: 1
3. TROUBLE FALLING OR STAYING ASLEEP: 0
6. FEELING BAD ABOUT YOURSELF - OR THAT YOU ARE A FAILURE OR HAVE LET YOURSELF OR YOUR FAMILY DOWN: 0
4. FEELING TIRED OR HAVING LITTLE ENERGY: 0
SUM OF ALL RESPONSES TO PHQ QUESTIONS 1-9: 2
5. POOR APPETITE OR OVEREATING: 1
SUM OF ALL RESPONSES TO PHQ QUESTIONS 1-9: 2
5. POOR APPETITE OR OVEREATING: 1
2. FEELING DOWN, DEPRESSED OR HOPELESS: 0
1. LITTLE INTEREST OR PLEASURE IN DOING THINGS: 1
10. IF YOU CHECKED OFF ANY PROBLEMS, HOW DIFFICULT HAVE THESE PROBLEMS MADE IT FOR YOU TO DO YOUR WORK, TAKE CARE OF THINGS AT HOME, OR GET ALONG WITH OTHER PEOPLE: 0
2. FEELING DOWN, DEPRESSED OR HOPELESS: 0
9. THOUGHTS THAT YOU WOULD BE BETTER OFF DEAD, OR OF HURTING YOURSELF: 0
SUM OF ALL RESPONSES TO PHQ9 QUESTIONS 1 & 2: 1
SUM OF ALL RESPONSES TO PHQ QUESTIONS 1-9: 1
SUM OF ALL RESPONSES TO PHQ QUESTIONS 1-9: 3
SUM OF ALL RESPONSES TO PHQ QUESTIONS 1-9: 2

## 2022-06-03 ASSESSMENT — ENCOUNTER SYMPTOMS
CONSTIPATION: 0
VOMITING: 0
COLOR CHANGE: 0
SHORTNESS OF BREATH: 0
SINUS PRESSURE: 0
EYE REDNESS: 0
BACK PAIN: 1
SORE THROAT: 0
WHEEZING: 0
COUGH: 0
CHEST TIGHTNESS: 0
ABDOMINAL PAIN: 0
EYE ITCHING: 0
NAUSEA: 0
DIARRHEA: 0
BLOOD IN STOOL: 0
RHINORRHEA: 0

## 2022-06-03 NOTE — ASSESSMENT & PLAN NOTE
Reviewed notes, improving and doing well  FMLA paperwork complete  Okay to return to work  Continue with PT

## 2022-06-03 NOTE — LETTER
[address] [facility] [address] [place] [address]  Phone: [phone]  Fax: [phone]    HEATHER [facility] CNP        Rubi 3, 2022     Patient: Varun Galaviz   YOB: 1955   Date of Visit: 6/3/2022       To Whom It May Concern: It is my medical opinion that Andria Patel may return to full duty immediately with no restrictions on Tuesday June 7, 2022. If you have any questions or concerns, please don't hesitate to call.     Sincerely,        HEATHER [facility] CNP
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
## 2022-06-03 NOTE — PROGRESS NOTES
Liliana Dowell is a 69 y.o. female with a date of birth of 1955. Vitals:    06/03/22 1339   BP: 107/83   Pulse: 63   Weight: 175 lb (79.4 kg)   Height: 4' 11\" (1.499 m)    BMI: Body mass index is 35.35 kg/m². Obesity Classification: Class III    Weight History: Wt Readings from Last 3 Encounters:   06/03/22 175 lb (79.4 kg)   06/03/22 175 lb (79.4 kg)   05/29/22 174 lb (78.9 kg)     Patient's lowest adult weight was 115 lb at age 17's. Patient's highest adult weight was 175 lb at age 69 - current. Patient has participated in the following weight loss programs: calorie restriction, lo carb. Patient has not participated in meal replacement/liquid diets. Patient has not participated in weight loss medications. Patient is not lactose intolerant. Patient does not have food allergies. Patient does not have Religion/cultural food preferences. 24 hour recall/food frequency chart:  Wake up 6 AM, works 8 AM-5 PM  Breakfast: Skips - coffee w/ creamer  Snack: 10 AM: Apple + sometimes PB  Lunch: 12:15 PM: Cabbage/peppers/onion w/ smoked sausage OR just fruit  Snack: 2:20 PM: vending machine -candy bar/cookie OR carnation bfast RTD  Dinner: 6-6:30 PM: Baked fish + veggies  Snack: 8 PM: cereal w/ skim milk  Bed around 10:30 PM, +snoring feels tired in AM  Drinks throughout the day: Coffee w/ creamer, ice water, skim milk 1-2 glasses/day   Do you drink alcohol? no.     Patient does not meet the criteria for binge eating disorder. Patient does have grazing. Patient does not have night eating. Patient does have a history of emotional eating or eating out of boredom/stress.       Goals  Weight: 130 lbs  Health Improvement: improve mobility, walk w/ grand kids, look and fel better overall     Assessment  Nutritional Needs: RMR=(9.99 x 79.4) + (6.25 x 149.9) - (4.92 x 67 y.o.) -161  = 1229 kcal x 1.3 (sedentary activity factor)= 1598 kcal - 500 (for 1 lb weight loss/week)= 1098 kcal.    Plan  Plan/Recommendations: General weight loss/lifestyle modification strategies discussed (elicit support from others; identify saboteurs; non-food rewards, etc). Diet interventions: 1200 kcal LC. Informal exercise measures discussed, e.g. taking stairs instead of elevator. Regular aerobic exercise program discussed. Optifast:  NA  Diet Medications:  NA    PES Statement: Overweight/Obesity related to lack of exercise, sedentary lifestyle, unhealthy eating habits, and unsuccessful diet attempts as evidenced by BMI. Body mass index is 35.35 kg/m². Will follow up as necessary.     Xu Larry, EMERY, LD

## 2022-06-07 ENCOUNTER — TELEPHONE (OUTPATIENT)
Dept: PSYCHIATRY | Age: 67
End: 2022-06-07

## 2022-06-07 NOTE — TELEPHONE ENCOUNTER
Pt calling saying she having dizziness and lightheaded.  Pt also stated she need a doctor  note stating she can go back to work on June 13,2022

## 2022-06-08 ENCOUNTER — TELEPHONE (OUTPATIENT)
Dept: FAMILY MEDICINE CLINIC | Age: 67
End: 2022-06-08

## 2022-06-08 ENCOUNTER — OFFICE VISIT (OUTPATIENT)
Dept: FAMILY MEDICINE CLINIC | Age: 67
End: 2022-06-08
Payer: COMMERCIAL

## 2022-06-08 VITALS
HEART RATE: 61 BPM | WEIGHT: 173 LBS | BODY MASS INDEX: 34.94 KG/M2 | DIASTOLIC BLOOD PRESSURE: 84 MMHG | SYSTOLIC BLOOD PRESSURE: 122 MMHG | TEMPERATURE: 97.2 F | OXYGEN SATURATION: 100 %

## 2022-06-08 DIAGNOSIS — R42 ORTHOSTATIC DIZZINESS: ICD-10-CM

## 2022-06-08 DIAGNOSIS — S39.012D STRAIN OF LUMBAR REGION, SUBSEQUENT ENCOUNTER: ICD-10-CM

## 2022-06-08 DIAGNOSIS — Z87.891 PERSONAL HISTORY OF TOBACCO USE: Primary | ICD-10-CM

## 2022-06-08 DIAGNOSIS — Z12.11 COLON CANCER SCREENING: ICD-10-CM

## 2022-06-08 PROCEDURE — 90750 HZV VACC RECOMBINANT IM: CPT | Performed by: NURSE PRACTITIONER

## 2022-06-08 PROCEDURE — 1123F ACP DISCUSS/DSCN MKR DOCD: CPT | Performed by: NURSE PRACTITIONER

## 2022-06-08 PROCEDURE — G0296 VISIT TO DETERM LDCT ELIG: HCPCS | Performed by: NURSE PRACTITIONER

## 2022-06-08 PROCEDURE — 99214 OFFICE O/P EST MOD 30 MIN: CPT | Performed by: NURSE PRACTITIONER

## 2022-06-08 PROCEDURE — 90471 IMMUNIZATION ADMIN: CPT | Performed by: NURSE PRACTITIONER

## 2022-06-08 ASSESSMENT — ENCOUNTER SYMPTOMS
RHINORRHEA: 0
CONSTIPATION: 0
NAUSEA: 0
COUGH: 0
SORE THROAT: 0
SINUS PRESSURE: 0
EYE REDNESS: 0
COLOR CHANGE: 0
BLOOD IN STOOL: 0
WHEEZING: 0
ABDOMINAL PAIN: 0
CHEST TIGHTNESS: 0
VOMITING: 0
SHORTNESS OF BREATH: 0
EYE ITCHING: 0
BACK PAIN: 0
DIARRHEA: 0

## 2022-06-08 NOTE — ASSESSMENT & PLAN NOTE
Reassurance provided do not believe any cardiac related etiology however she does have an appoint with her cardiologist in 2 weeks. Teaching opportunity to advised her to move slowly in order to eliminate dizzy feeling.

## 2022-06-08 NOTE — LETTER
[address] [facility] [address] [place] [address]  Phone: [phone]  Fax: [phone]    HEATHER Cardoso CNP        June 8, 2022     Patient: Moreno Sigala   YOB: 1955   Date of Visit: 6/8/2022       To Whom It May Concern: It is my medical opinion that Lanita Schwab may return to work on 6/13/22. If you have any questions or concerns, please don't hesitate to call.     Sincerely,        HEATHER Cardoso CNP

## 2022-06-08 NOTE — TELEPHONE ENCOUNTER
Patient is requesting new return to work letter for 6/13/22 with addition of verbiage \"no restriction\" per her employers request.  Call patient when ready and she will pick it up

## 2022-07-03 PROBLEM — Z01.818 PREOPERATIVE CLEARANCE: Status: RESOLVED | Noted: 2022-06-03 | Resolved: 2022-07-03

## 2022-07-13 RX ORDER — LISINOPRIL 2.5 MG/1
TABLET ORAL
Qty: 90 TABLET | Refills: 0 | Status: SHIPPED | OUTPATIENT
Start: 2022-07-13 | End: 2022-10-31

## 2022-07-16 ENCOUNTER — HOSPITAL ENCOUNTER (OUTPATIENT)
Dept: MAMMOGRAPHY | Age: 67
Discharge: HOME | End: 2022-07-16
Payer: COMMERCIAL

## 2022-07-16 VITALS — HEIGHT: 59 IN | BODY MASS INDEX: 34.47 KG/M2 | WEIGHT: 171 LBS

## 2022-07-16 DIAGNOSIS — Z12.31 VISIT FOR SCREENING MAMMOGRAM: ICD-10-CM

## 2022-07-16 PROCEDURE — 77063 BREAST TOMOSYNTHESIS BI: CPT

## 2022-08-11 ENCOUNTER — TELEPHONE (OUTPATIENT)
Dept: FAMILY MEDICINE CLINIC | Age: 67
End: 2022-08-11

## 2022-08-11 DIAGNOSIS — I73.9 PAD (PERIPHERAL ARTERY DISEASE) (HCC): Primary | ICD-10-CM

## 2022-08-11 NOTE — TELEPHONE ENCOUNTER
[facility] Asya Laughlin MD  [address] [place] [address]  [address]  [phone]

## 2022-08-11 NOTE — TELEPHONE ENCOUNTER
----- Message from Tracyalejandro Weeks sent at 8/11/2022 10:12 AM EDT -----  Subject: Referral Request    Reason for referral request? patient needs referral/order for PAD on both   legs  Provider patient wants to be referred to(if known):     Provider Phone Number(if known):     Additional Information for Provider? want it in same location she had last   PAD  ---------------------------------------------------------------------------  --------------  [id number]    [phone]; OK to leave message on voicemail  ---------------------------------------------------------------------------  --------------

## 2022-08-16 ENCOUNTER — TELEPHONE (OUTPATIENT)
Dept: VASCULAR SURGERY | Age: 67
End: 2022-08-16

## 2022-08-16 DIAGNOSIS — I73.9 PAD (PERIPHERAL ARTERY DISEASE) (HCC): Primary | ICD-10-CM

## 2022-08-16 PROBLEM — M25.511 ACUTE PAIN OF RIGHT SHOULDER: Status: ACTIVE | Noted: 2017-07-22

## 2022-08-16 PROBLEM — R06.83 SNORING: Status: ACTIVE | Noted: 2017-07-22

## 2022-08-22 ENCOUNTER — HOSPITAL ENCOUNTER (OUTPATIENT)
Dept: VASCULAR LAB | Age: 67
Discharge: HOME | End: 2022-08-22
Payer: COMMERCIAL

## 2022-08-22 DIAGNOSIS — I73.9 PAD (PERIPHERAL ARTERY DISEASE) (HCC): ICD-10-CM

## 2022-08-22 PROCEDURE — 93925 LOWER EXTREMITY STUDY: CPT

## 2022-08-24 DIAGNOSIS — F33.2 SEVERE EPISODE OF RECURRENT MAJOR DEPRESSIVE DISORDER, WITHOUT PSYCHOTIC FEATURES (HCC): ICD-10-CM

## 2022-08-24 RX ORDER — BUPROPION HYDROCHLORIDE 150 MG/1
TABLET ORAL
Qty: 30 TABLET | Refills: 3 | Status: SHIPPED | OUTPATIENT
Start: 2022-08-24

## 2022-09-22 ENCOUNTER — OFFICE VISIT (OUTPATIENT)
Dept: VASCULAR SURGERY | Age: 67
End: 2022-09-22
Payer: COMMERCIAL

## 2022-09-22 VITALS
WEIGHT: 171.4 LBS | SYSTOLIC BLOOD PRESSURE: 142 MMHG | TEMPERATURE: 97.9 F | HEIGHT: 59 IN | HEART RATE: 57 BPM | DIASTOLIC BLOOD PRESSURE: 78 MMHG | BODY MASS INDEX: 34.56 KG/M2

## 2022-09-22 DIAGNOSIS — E66.09 CLASS 1 OBESITY DUE TO EXCESS CALORIES WITH BODY MASS INDEX (BMI) OF 34.0 TO 34.9 IN ADULT, UNSPECIFIED WHETHER SERIOUS COMORBIDITY PRESENT: Primary | ICD-10-CM

## 2022-09-22 DIAGNOSIS — I87.2 VENOUS INSUFFICIENCY OF BOTH LOWER EXTREMITIES: ICD-10-CM

## 2022-09-22 PROCEDURE — 1123F ACP DISCUSS/DSCN MKR DOCD: CPT | Performed by: SURGERY

## 2022-09-22 PROCEDURE — 99204 OFFICE O/P NEW MOD 45 MIN: CPT | Performed by: SURGERY

## 2022-09-22 NOTE — PROGRESS NOTES
[facility] Vascular Surgery  Pamela Archuleta MD, [address], [address]    OUTPATIENT CONSULTATION          Date of Consultation:  9/22/2022    PCP:  HEATHER Bender CNP       Chief Complaint: Leg pain    History of Present Illness:   Chery Carcamo a 69 y.o. female who presents today for lower extremity pain. She reports that she works 8-hour shifts, standing on her feet during all this time. Reports increased pain as the day goes on. Reports heaviness and throbbing in the legs. Reports discomfort in the hips and thighs when she climbs stairs. Denies specific claudication symptoms. States that she does not participate in any other cardiovascular activity. She is a former smoker, nondiabetic. She is also concerned because her mom was an amputee. She is concerned that she may have PAD. She had a lower extremity arterial duplex study which showed no evidence of arterial sufficiency of either lower extremity. I personally reviewed images the following study:  VL DUP LOWER EXTREMITY ARTERIES BILATERAL 8/22/2022  Right KHALIF 1.05, left KHALIF 1.0    No evidence of significant arterial insufficiency of the bilateral lower    extremities. PastMedical History:  Past Medical History:   Diagnosis Date    Cardiomyopathy ([address] 1/7/2014    Dx @ [address]. Chronic back pain     Colon polyp     DDD (degenerative disc disease), lumbar 3/30/2015    Degenerative arthritis of cervical spine     Depression 12/16/2014    Essential hypertension, benign     Hyperlipidemia     Osteopenia     Varicose vein      Past Surgical History:   Past Surgical History:   Procedure Laterality Date    COLONOSCOPY      HYSTERECTOMY (CERVIX STATUS UNKNOWN)      OVARY REMOVAL       Home Medications:   Prior to Admission medications    Medication Sig Start Date End Date Taking?  Authorizing Provider   buPROPion (WELLBUTRIN XL) 150 MG extended release tablet TAKE ONE TABLET BY MOUTH EVERY MORNING 8/24/22  Yes HEATHER Ramirez CNP   lisinopril (PRINIVIL;ZESTRIL) 2.5 MG tablet TAKE ONE TABLET BY MOUTH DAILY 22  Yes HEATHER Ramirez CNP   Calcium Citrate-Vitamin D (CALCIUM CITRATE CHEWY BITE) 500-500 MG-UNIT CHEW Take by mouth   Yes Historical Provider, MD   metoprolol succinate (TOPROL XL) 25 MG extended release tablet Take 1 tablet by mouth daily 21  Yes Chelsey Malhotra MD   atorvastatin (LIPITOR) 10 MG tablet TAKE ONE TABLET BY MOUTH DAILY 21  Yes HEATHER Magana CNP   calcium carbonate 600 MG TABS tablet Take 1 tablet by mouth daily   Yes Historical Provider, MD      Allergies:  Patient has no known allergies. Social History:    Social History     Socioeconomic History    Marital status:      Spouse name: Not on file    Number of children: Not on file    Years of education: Not on file    Highest education level: Not on file   Occupational History    Not on file   Tobacco Use    Smoking status: Former     Packs/day: 0.50     Years: 41.00     Pack years: 20.50     Types: Cigarettes     Start date: 2013     Quit date: 2013     Years since quittin.0    Smokeless tobacco: Never   Vaping Use    Vaping Use: Never used   Substance and Sexual Activity    Alcohol use: No    Drug use: No    Sexual activity: Not Currently     Partners: Male     Birth control/protection: Condom   Other Topics Concern    Not on file   Social History Narrative    Not on file     Social Determinants of Health     Financial Resource Strain: Not on file   Food Insecurity: Not on file   Transportation Needs: Not on file   Physical Activity: Not on file   Stress: Not on file   Social Connections: Not on file   Intimate Partner Violence: Not on file   Housing Stability: Not on file     Review of Systems:  Pertinent positive and negative items are noted in the HPI. A comprehensive review of all other symptoms is otherwise negative.        Physical Examination:    Vitals: 09/22/22 0915   BP: (!) 142/78   Pulse: 57   Temp: 97.9 °F (36.6 °C)   Weight: 171 lb 6.4 oz (77.7 kg)   Height: 4' 11\" (1.499 m)     Body mass index is 34.62 kg/m². Physical Exam  General:  AAO x 3. Pleasant, well-groomed, WD/WN, moderately obese. Appears stated age. HEENT:  supple, no masses or adenopathy. No JVD. Carotids 2+ w/o bruit. Heart:  RRR no m/r/g  Lungs:  CTA B no w/r/r  Abdomen:  soft, NT/ND, no masses, moderately obese  Extremities:  warm, well-perfused. 1+ chronic nonpitting edema. No significant stasis discoloration or stasis dermatitis. She does have scattered telangiectasias throughout the lower extremities and a few moderate sized bulging varicosities in the right anterior medial thigh. No palpable cords. No tenderness of the extremities. No cyanosis or mottling. Normal color and temperature of the extremities. Normal capillary refill. Vascular: Bilateral femoral, popliteal, dorsalis pedis 2+ palpable. Bilateral posterior tibial 1-2+ palpable  Skin:  warm, dry, no rash, no jaundice. Diagnosis:    Bilateral lower extremity pain-multifactorial  Obesity  Spider veins bilateral lower extremities      Plan:   There is no evidence of arterial insufficiency or claudication in the lower extremities. Symptoms are likely multifactorial and probably a combination of venous insufficiency as well as arthritic complaints. We discussed venous insufficiency in some detail. We discussed how this is exacerbated by excess weight as well as certain activities such as standing. In addition to weight management, also advised compression stockings 20 to 30 mmHg knee-high, which I think will improve her symptomatology while working/standing. Also recommended increase cardiovascular activity, particularly walking.     In reference to possible arthritic component, advised her that I am not sure how significant a contributor this is to her pain, but would advise her to speak to her PCP regarding this. No indication for further evaluation of arterial disease. No indication for venous intervention at this time. I will be happy to see her back on an as-needed basis.

## 2022-09-29 ENCOUNTER — NURSE TRIAGE (OUTPATIENT)
Dept: OTHER | Facility: CLINIC | Age: 67
End: 2022-09-29

## 2022-09-29 NOTE — TELEPHONE ENCOUNTER
Received call from Delisa Sosa at [facility], caller not on line. Complaint: left arm pain    Practice Name: [facility] telephone number verified as [phone]. Unsuccessful attempt to re-connect with caller via phone, left message for return call to office.     Reason for Disposition   Message left on identified voice mail    Protocols used: No Contact or Duplicate Contact Call-ADULT-

## 2022-10-05 ENCOUNTER — TELEPHONE (OUTPATIENT)
Dept: FAMILY MEDICINE CLINIC | Age: 67
End: 2022-10-05

## 2022-10-05 ENCOUNTER — TELEPHONE (OUTPATIENT)
Dept: ORTHOPEDIC SURGERY | Age: 67
End: 2022-10-05

## 2022-10-05 ENCOUNTER — OFFICE VISIT (OUTPATIENT)
Dept: ORTHOPEDIC SURGERY | Age: 67
End: 2022-10-05
Payer: COMMERCIAL

## 2022-10-05 VITALS — BODY MASS INDEX: 34.53 KG/M2 | WEIGHT: 171.3 LBS | HEIGHT: 59 IN

## 2022-10-05 DIAGNOSIS — M54.12 RADICULITIS OF LEFT CERVICAL REGION: ICD-10-CM

## 2022-10-05 DIAGNOSIS — M54.12 CERVICAL RADICULAR PAIN: ICD-10-CM

## 2022-10-05 DIAGNOSIS — M25.512 LEFT SHOULDER PAIN, UNSPECIFIED CHRONICITY: ICD-10-CM

## 2022-10-05 DIAGNOSIS — M50.30 DDD (DEGENERATIVE DISC DISEASE), CERVICAL: ICD-10-CM

## 2022-10-05 DIAGNOSIS — M47.812 CERVICAL SPONDYLOSIS: Primary | ICD-10-CM

## 2022-10-05 PROCEDURE — 1123F ACP DISCUSS/DSCN MKR DOCD: CPT | Performed by: INTERNAL MEDICINE

## 2022-10-05 PROCEDURE — 99214 OFFICE O/P EST MOD 30 MIN: CPT | Performed by: INTERNAL MEDICINE

## 2022-10-05 RX ORDER — METHYLPREDNISOLONE 4 MG/1
TABLET ORAL
Qty: 1 KIT | Refills: 0 | Status: CANCELLED | OUTPATIENT
Start: 2022-10-05

## 2022-10-05 RX ORDER — ATORVASTATIN CALCIUM 10 MG/1
TABLET, FILM COATED ORAL
Qty: 90 TABLET | Refills: 3 | Status: SHIPPED | OUTPATIENT
Start: 2022-10-05

## 2022-10-05 NOTE — LETTER
[address]  [address] [facility] [address]  Phone: [phone]  Fax: [phone]    Bhaskar Howell MD        October 5, 2022     Patient: Danie Cuadra   YOB: 1955   Date of Visit: 10/5/2022       To Whom It May Concern: It is my medical opinion that Aurelia Ware She was exposed from the workplace related to active medical condition on yesterday's date 10/4/2022. She can return to work on Thursday, 10/6/2022 with recommendation of ergonomic evaluation of her workstation primarily as relates to the position of her computer monitor which is negatively affecting her neck symptoms. If you have any questions or concerns, please don't hesitate to call.     Sincerely,      Jarret Tirado MD.    Bhaskar Howell MD

## 2022-10-05 NOTE — TELEPHONE ENCOUNTER
Pt is at [facility]  They need approval for her atorvastatin (LIPITOR) 10 MG tablet

## 2022-10-05 NOTE — PROGRESS NOTES
Chief Complaint:   Chief Complaint   Patient presents with    Shoulder Pain     pain in upper back and L shld, radiates into L arm and down to wrist, tender forearm, has been working as a  for 23 years with repetitive motions which make it worse. History of Present Illness:       Patient is a 69 y.o. female presents with the above complaint. The symptoms began 1 weeksago and started without an injury. The patient describes a sharp, aching pain that does radiate. The symptoms are constant  and are show no change since the onset. The neck pain is location: left sided, posterior  severity: 8 out of 10 and is worsened by extension. The patient has not noted new onset or progressive weakness of the upper extremites. The neck pain : arm pain is 50 : 50 and follows aC7 dermatomal pattern. Treatment to date has included none and symptoms have not improved with this treatment. The patient denies history of neck trauma. Their is not history or orthopaedic cervical spine surgery. Work up to date has included:none    The patient has no prior history of autoimmune disease, inflammatory arthropathy or crystal arthropathy. Past Medical History:        Past Medical History:   Diagnosis Date    Cardiomyopathy ([address] 1/7/2014    Dx @ [address]     Chronic back pain     Colon polyp     DDD (degenerative disc disease), lumbar 3/30/2015    Degenerative arthritis of cervical spine     Depression 12/16/2014    Essential hypertension, benign     Hyperlipidemia     Osteopenia     Varicose vein          Past Surgical History:   Procedure Laterality Date    COLONOSCOPY      HYSTERECTOMY (CERVIX STATUS UNKNOWN)      OVARY REMOVAL           Present Medications:         Current Outpatient Medications   Medication Sig Dispense Refill    atorvastatin (LIPITOR) 10 MG tablet 1 tablet daily 90 tablet 3    buPROPion (WELLBUTRIN XL) 150 MG extended release tablet TAKE ONE TABLET BY MOUTH EVERY MORNING 30 tablet 3    lisinopril (PRINIVIL;ZESTRIL) 2.5 MG tablet TAKE ONE TABLET BY MOUTH DAILY 90 tablet 0    Calcium Citrate-Vitamin D (CALCIUM CITRATE CHEWY BITE) 500-500 MG-UNIT CHEW Take by mouth      metoprolol succinate (TOPROL XL) 25 MG extended release tablet Take 1 tablet by mouth daily 90 tablet 3    calcium carbonate 600 MG TABS tablet Take 1 tablet by mouth daily       No current facility-administered medications for this visit. Allergies:      No Known Allergies     Social History:         Social History     Socioeconomic History    Marital status:      Spouse name: Not on file    Number of children: Not on file    Years of education: Not on file    Highest education level: Not on file   Occupational History    Not on file   Tobacco Use    Smoking status: Former     Packs/day: 0.50     Years: 41.00     Pack years: 20.50     Types: Cigarettes     Start date: 2013     Quit date: 2013     Years since quittin.1    Smokeless tobacco: Never   Vaping Use    Vaping Use: Never used   Substance and Sexual Activity    Alcohol use: No    Drug use: No    Sexual activity: Not Currently     Partners: Male     Birth control/protection: Condom   Other Topics Concern    Not on file   Social History Narrative    Not on file     Social Determinants of Health     Financial Resource Strain: Not on file   Food Insecurity: Not on file   Transportation Needs: Not on file   Physical Activity: Not on file   Stress: Not on file   Social Connections: Not on file   Intimate Partner Violence: Not on file   Housing Stability: Not on file        Review of Symptoms:    Pertinent items are noted in HPI    Review of systems reviewed from Patient History Form dated on today's date and   available in the patient's chart under the Media tab. Vital Signs: There were no vitals filed for this visit.      General Exam:     Constitutional: Patient is adequately groomed with no evidence of malnutrition  Mental Status: The patient is oriented to time, place and person. The patient's mood and affect are appropriate. Vascular: Examination reveals no swelling or calf tenderness. Peripheral pulses are palpable and 2+. Lymphatics: no lymphadenopathy of the cervical or axillary regions or upper extremity      Physical Exam:  Cervical  neck      Primary Exam:    Inspection: No deformity atrophy appreciable curvature      Palpation: No focal trigger point tenderness      Range of Motion: Mild restriction with extension near full range in all other planes      Strength: Normal upper extremity      Special Tests: Spurling sign equivocal left      Skin: There are no rashes, ulcerations or lesions. Gait: Nonantalgic      Reflex intact upper     Additional Comments:        Additional Examinations:           Right Upper Extremity:  Examination of the right upper extremity does not show any tenderness, deformity or injury. Range of motion is unremarkable. There is no gross instability. There are no rashes, ulcerations or lesions. Strength and tone are normal.  Left Upper Extremity: Examination of the left upper extremity does not show any tenderness, deformity or injury. Range of motion is unremarkable. There is no gross instability. There are no rashes, ulcerations or lesions. Strength and tone are normal.   Neurologic -Light touch sensation and manual muscle testing is normal C5-C8 . Biceps and triceps reflexes are symmetric and +2.  Spurlling sign is negative         Office Imaging Results/Procedures PerformedToday:          Office Procedures:     Orders Placed This Encounter   Procedures    XR SHOULDER LEFT (MIN 2 VIEWS)     Standing Status:   Future     Number of Occurrences:   1     Standing Expiration Date:   11/4/2022     Order Specific Question:   Reason for exam:     Answer:   Pain    XR CERVICAL SPINE (2-3 VIEWS)     Standing Status:   Future     Number of Occurrences:   1     Standing Expiration Date: 10/5/2023     Order Specific Question:   Reason for exam:     Answer:   pain           Other Outside Imaging and Testing Personally Reviewed:    XR CERVICAL SPINE (2-3 VIEWS)    Result Date: 10/5/2022  Radiology exam is complete. No Radiologist dictation. Please follow up with ordering provider. XR SHOULDER LEFT (MIN 2 VIEWS)    Result Date: 10/5/2022  Radiology exam is complete. No Radiologist dictation. Please follow up with ordering provider. Latest Reference Range & Units 5/29/22 19:15   Sodium 136 - 145 mmol/L 139   Potassium 3.5 - 5.1 mmol/L 3.9   Chloride 99 - 110 mmol/L 101   CO2 21 - 32 mmol/L 27   BUN,BUNPL 7 - 20 mg/dL 27 (H)   Creatinine 0.6 - 1.2 mg/dL 1.2   Anion Gap 3 - 16  11   GFR Non- >60  45 ! GFR  >60  54 ! Glucose, Random 70 - 99 mg/dL 113 (H)   CALCIUM, SERUM, 718812 8.3 - 10.6 mg/dL 9.6   Total Protein 6.4 - 8.2 g/dL 7.5   (H): Data is abnormally high  !: Data is abnormal        Assessment   Impression: . Encounter Diagnoses   Name Primary? Radiculitis of left cervical region     DDD (degenerative disc disease), cervical     Left shoulder pain, unspecified chronicity     Cervical radicular pain     Cervical spondylosis Yes              Plan:     Medrol Dosepak followed by meloxicam with GI precaution  Cervical stabilization home exercise program   Ergonomic evaluation of her workstation with recommendation for adjustments  MRI evaluation cervical spine if symptoms show no appreciable change or worsen rule out compressive discopathy/stenosis      The nature of the finding, probable diagnosis and likely treatment was thoroughly discussed with the patient. The options, risks, complications, alternative treatment as well as some of the differential diagnosis was discussed. The patient was thoroughly informed and all questions were answered. the patient indicated understanding and satisfaction with the discussion.       Orders:        Orders Placed This Encounter   Procedures    XR SHOULDER LEFT (MIN 2 VIEWS)     Standing Status:   Future     Number of Occurrences:   1     Standing Expiration Date:   11/4/2022     Order Specific Question:   Reason for exam:     Answer:   Pain    XR CERVICAL SPINE (2-3 VIEWS)     Standing Status:   Future     Number of Occurrences:   1     Standing Expiration Date:   10/5/2023     Order Specific Question:   Reason for exam:     Answer:   pain           Disclaimer: \"This note was dictated with voice recognition software. Though review and correction are routine, we apologize for any errors. \" Not applicable

## 2022-10-05 NOTE — TELEPHONE ENCOUNTER
General Question     Subject: patient call and stated she did not get her medication that was discuss at her appt today she stated it was steroid Vasile and [facility] [address] in she also stated she think they close at 5 pm today she just need a call back. Please Advise.   Patient Raheel Paniagua  Contact Number: [phone]

## 2022-10-09 RX ORDER — METHYLPREDNISOLONE 4 MG/1
TABLET ORAL
Qty: 1 KIT | Refills: 0 | Status: SHIPPED | OUTPATIENT
Start: 2022-10-09

## 2022-10-31 RX ORDER — LISINOPRIL 2.5 MG/1
TABLET ORAL
Qty: 90 TABLET | Refills: 0 | Status: SHIPPED | OUTPATIENT
Start: 2022-10-31

## 2022-11-09 ENCOUNTER — OFFICE VISIT (OUTPATIENT)
Dept: FAMILY MEDICINE CLINIC | Age: 67
End: 2022-11-09
Payer: COMMERCIAL

## 2022-11-09 VITALS
OXYGEN SATURATION: 97 % | SYSTOLIC BLOOD PRESSURE: 122 MMHG | HEIGHT: 59 IN | HEART RATE: 65 BPM | WEIGHT: 173 LBS | BODY MASS INDEX: 34.88 KG/M2 | DIASTOLIC BLOOD PRESSURE: 84 MMHG | TEMPERATURE: 97.3 F

## 2022-11-09 DIAGNOSIS — E78.5 DYSLIPIDEMIA: ICD-10-CM

## 2022-11-09 DIAGNOSIS — Z00.00 ROUTINE GENERAL MEDICAL EXAMINATION AT A HEALTH CARE FACILITY: ICD-10-CM

## 2022-11-09 DIAGNOSIS — F33.2 SEVERE EPISODE OF RECURRENT MAJOR DEPRESSIVE DISORDER, WITHOUT PSYCHOTIC FEATURES (HCC): ICD-10-CM

## 2022-11-09 DIAGNOSIS — I10 ESSENTIAL HYPERTENSION: ICD-10-CM

## 2022-11-09 DIAGNOSIS — Z00.00 ROUTINE GENERAL MEDICAL EXAMINATION AT A HEALTH CARE FACILITY: Primary | ICD-10-CM

## 2022-11-09 PROCEDURE — 3074F SYST BP LT 130 MM HG: CPT | Performed by: NURSE PRACTITIONER

## 2022-11-09 PROCEDURE — 3078F DIAST BP <80 MM HG: CPT | Performed by: NURSE PRACTITIONER

## 2022-11-09 PROCEDURE — 99397 PER PM REEVAL EST PAT 65+ YR: CPT | Performed by: NURSE PRACTITIONER

## 2022-11-09 ASSESSMENT — ENCOUNTER SYMPTOMS
COUGH: 0
EYE ITCHING: 0
VOMITING: 0
CONSTIPATION: 0
SORE THROAT: 0
BLOOD IN STOOL: 0
CHEST TIGHTNESS: 0
BACK PAIN: 0
SHORTNESS OF BREATH: 0
NAUSEA: 0
COLOR CHANGE: 0
EYE REDNESS: 0
DIARRHEA: 0
WHEEZING: 0
ABDOMINAL PAIN: 0
SINUS PRESSURE: 0
RHINORRHEA: 0

## 2022-11-09 NOTE — PROGRESS NOTES
Olga Johnston (:  1955) is a 69 y.o. female,Established patient, here for evaluation of the following chief complaint(s): Annual Exam      ASSESSMENT/PLAN:  1. Routine general medical examination at a health care facility  Assessment & Plan:  Well exam in office. HM addressed. Discussed ideas to improve cardiovascular exercise at home and to increase water intake. Will get flu shot through work. Labs today. Orders:  -     TSH with Reflex; Future  -     LIPID PANEL; Future  -     Comprehensive Metabolic Panel, Fasting; Future  2. Dyslipidemia  Assessment & Plan:  Stable, controlled. Will get lab work today. Continue treatment plan. 3. Severe episode of recurrent major depressive disorder, without psychotic features ([facility] [address]  Assessment & Plan:  Stable, controlled. Continue current treatment plan. 4. Essential hypertension  Assessment & Plan:  Stable, controlled on current treatment plan. Following with specialist in December. No follow-ups on file. SUBJECTIVE/OBJECTIVE:  Patient presents for routine physical exam. She has a history of hypertension, dyslipidemia, depression. She is overall doing well, no acute concerns. She feels she is gaining weight, she stands a lot at work but does not exercise on a daily basis. Denies sob, chest pain. Following with cardiologist in December. Denies n/v/d/c. No blood in stool. Denies irregular menstrual bleeding.        Current Outpatient Medications   Medication Sig Dispense Refill    lisinopril (PRINIVIL;ZESTRIL) 2.5 MG tablet TAKE ONE TABLET BY MOUTH DAILY 90 tablet 0    atorvastatin (LIPITOR) 10 MG tablet 1 tablet daily 90 tablet 3    buPROPion (WELLBUTRIN XL) 150 MG extended release tablet TAKE ONE TABLET BY MOUTH EVERY MORNING 30 tablet 3    Calcium Citrate-Vitamin D (CALCIUM CITRATE CHEWY BITE) 500-500 MG-UNIT CHEW Take by mouth      metoprolol succinate (TOPROL XL) 25 MG extended release tablet Take 1 tablet by mouth daily 90 tablet 3    calcium carbonate 600 MG TABS tablet Take 1 tablet by mouth daily      methylPREDNISolone (MEDROL, GENEAV,) 4 MG tablet By mouth. (Patient not taking: Reported on 11/9/2022) 1 kit 0     No current facility-administered medications for this visit. Review of Systems   Constitutional:  Negative for chills, fatigue and fever. HENT:  Negative for congestion, ear pain, postnasal drip, rhinorrhea, sinus pressure, sneezing and sore throat. Eyes:  Negative for redness and itching. Respiratory:  Negative for cough, chest tightness, shortness of breath and wheezing. Cardiovascular:  Negative for chest pain and palpitations. Gastrointestinal:  Negative for abdominal pain, blood in stool, constipation, diarrhea, nausea and vomiting. Endocrine: Negative for cold intolerance and heat intolerance. Genitourinary:  Negative for difficulty urinating, dysuria, flank pain, frequency, hematuria and urgency. Musculoskeletal:  Positive for arthralgias. Negative for back pain, joint swelling and myalgias. Skin:  Negative for color change, pallor, rash and wound. Allergic/Immunologic: Negative for environmental allergies and food allergies. Neurological:  Negative for dizziness, seizures, syncope, weakness, light-headedness, numbness and headaches. Hematological:  Negative for adenopathy. Does not bruise/bleed easily. Psychiatric/Behavioral:  Negative for confusion, sleep disturbance and suicidal ideas. The patient is not nervous/anxious and is not hyperactive. Vitals:    11/09/22 1608   BP: 122/84   Site: Right Upper Arm   Position: Sitting   Cuff Size: Large Adult   Pulse: 65   Temp: 97.3 °F (36.3 °C)   SpO2: 97%   Weight: 173 lb (78.5 kg)   Height: 4' 11\" (1.499 m)       Physical Exam  Vitals reviewed. Constitutional:       Appearance: Normal appearance. She is well-developed. HENT:      Head: Normocephalic and atraumatic.       Right Ear: Hearing normal.      Left Ear: Hearing normal.      Nose: No mucosal edema.      Right Sinus: No maxillary sinus tenderness or frontal sinus tenderness. Left Sinus: No maxillary sinus tenderness or frontal sinus tenderness. Mouth/Throat:      Mouth: Mucous membranes are moist.      Tonsils: No tonsillar abscesses. Eyes:      Extraocular Movements: Extraocular movements intact. Pupils: Pupils are equal, round, and reactive to light. Cardiovascular:      Rate and Rhythm: Normal rate and regular rhythm. Pulses: Normal pulses. Heart sounds: Normal heart sounds. Pulmonary:      Effort: Pulmonary effort is normal.      Breath sounds: Normal breath sounds. Abdominal:      Palpations: Abdomen is soft. Musculoskeletal:         General: Normal range of motion. Lymphadenopathy:      Head:      Right side of head: No submental, submandibular, tonsillar, preauricular, posterior auricular or occipital adenopathy. Left side of head: No submental, submandibular, tonsillar, preauricular, posterior auricular or occipital adenopathy. Skin:     General: Skin is warm and dry. Neurological:      General: No focal deficit present. Mental Status: She is alert and oriented to person, place, and time. Psychiatric:         Mood and Affect: Mood normal.         Behavior: Behavior normal.               An electronic signature was used to authenticate this note.     --Nir Hammond, HEATHER - CNP

## 2022-11-10 LAB
A/G RATIO: 2.1 (ref 1.1–2.2)
ALBUMIN SERPL-MCNC: 4.4 G/DL (ref 3.4–5)
ALP BLD-CCNC: 104 U/L (ref 40–129)
ALT SERPL-CCNC: 21 U/L (ref 10–40)
ANION GAP SERPL CALCULATED.3IONS-SCNC: 13 MMOL/L (ref 3–16)
AST SERPL-CCNC: 21 U/L (ref 15–37)
BILIRUB SERPL-MCNC: 0.4 MG/DL (ref 0–1)
BUN BLDV-MCNC: 27 MG/DL (ref 7–20)
CALCIUM SERPL-MCNC: 9.7 MG/DL (ref 8.3–10.6)
CHLORIDE BLD-SCNC: 103 MMOL/L (ref 99–110)
CHOLESTEROL, TOTAL: 197 MG/DL (ref 0–199)
CO2: 26 MMOL/L (ref 21–32)
CREAT SERPL-MCNC: 0.9 MG/DL (ref 0.6–1.2)
GFR SERPL CREATININE-BSD FRML MDRD: >60 ML/MIN/{1.73_M2}
GLUCOSE FASTING: 93 MG/DL (ref 70–99)
HDLC SERPL-MCNC: 57 MG/DL (ref 40–60)
LDL CHOLESTEROL CALCULATED: 106 MG/DL
POTASSIUM SERPL-SCNC: 4.4 MMOL/L (ref 3.5–5.1)
SODIUM BLD-SCNC: 142 MMOL/L (ref 136–145)
TOTAL PROTEIN: 6.5 G/DL (ref 6.4–8.2)
TRIGL SERPL-MCNC: 171 MG/DL (ref 0–150)
TSH REFLEX: 1.19 UIU/ML (ref 0.27–4.2)
VLDLC SERPL CALC-MCNC: 34 MG/DL

## 2022-12-01 ENCOUNTER — OFFICE VISIT (OUTPATIENT)
Dept: CARDIOLOGY CLINIC | Age: 67
End: 2022-12-01
Payer: COMMERCIAL

## 2022-12-01 VITALS
WEIGHT: 173.8 LBS | HEART RATE: 60 BPM | SYSTOLIC BLOOD PRESSURE: 130 MMHG | BODY MASS INDEX: 35.1 KG/M2 | DIASTOLIC BLOOD PRESSURE: 70 MMHG

## 2022-12-01 DIAGNOSIS — R00.2 PALPITATIONS: ICD-10-CM

## 2022-12-01 DIAGNOSIS — E78.5 HYPERLIPIDEMIA, UNSPECIFIED HYPERLIPIDEMIA TYPE: ICD-10-CM

## 2022-12-01 DIAGNOSIS — I10 ESSENTIAL HYPERTENSION: Primary | ICD-10-CM

## 2022-12-01 PROCEDURE — 1123F ACP DISCUSS/DSCN MKR DOCD: CPT | Performed by: INTERNAL MEDICINE

## 2022-12-01 PROCEDURE — 93000 ELECTROCARDIOGRAM COMPLETE: CPT | Performed by: INTERNAL MEDICINE

## 2022-12-01 PROCEDURE — 3078F DIAST BP <80 MM HG: CPT | Performed by: INTERNAL MEDICINE

## 2022-12-01 PROCEDURE — 99214 OFFICE O/P EST MOD 30 MIN: CPT | Performed by: INTERNAL MEDICINE

## 2022-12-01 PROCEDURE — 3074F SYST BP LT 130 MM HG: CPT | Performed by: INTERNAL MEDICINE

## 2022-12-01 NOTE — PROGRESS NOTES
Subjective:      Patient ID: Jackson Meeks is a 69 y.o. female. CC:  Palpitations. Chest pain,     HPI Ade Landry had dizziness and palpitations and takes one whole pill in am.  No chest pain has some heart burn. No Known Allergies     Social History     Socioeconomic History    Marital status:      Spouse name: Not on file    Number of children: Not on file    Years of education: Not on file    Highest education level: Not on file   Occupational History    Not on file   Tobacco Use    Smoking status: Former     Packs/day: 0.50     Years: 41.00     Pack years: 20.50     Types: Cigarettes     Start date: 2013     Quit date: 2013     Years since quittin.2    Smokeless tobacco: Never   Vaping Use    Vaping Use: Never used   Substance and Sexual Activity    Alcohol use: No    Drug use: No    Sexual activity: Not Currently     Partners: Male     Birth control/protection: Condom   Other Topics Concern    Not on file   Social History Narrative    Not on file     Social Determinants of Health     Financial Resource Strain: Not on file   Food Insecurity: Not on file   Transportation Needs: Not on file   Physical Activity: Not on file   Stress: Not on file   Social Connections: Not on file   Intimate Partner Violence: Not on file   Housing Stability: Not on file        Patient has a family history includes Breast Cancer in her sister and sister; Cancer in her father; Colon Cancer in her brother; Coronary Art Dis in an other family member; Hypertension in an other family member; Other in an other family member. Patient  has a past medical history of Cardiomyopathy ([address] Chronic back pain, Colon polyp, DDD (degenerative disc disease), lumbar, Degenerative arthritis of cervical spine, Depression, Essential hypertension, benign, Hyperlipidemia, Osteopenia, and Varicose vein.      Current Outpatient Medications   Medication Sig Dispense Refill    lisinopril (PRINIVIL;ZESTRIL) 2.5 MG tablet TAKE tenderness. Lymphadenopathy:     She has no cervical adenopathy. Neurological: She is alert and oriented to person, place, and time. No cranial nerve deficit. Coordination normal.   Skin: Skin is warm and dry. No rash noted. No erythema. No pallor. Psychiatric: She has a normal mood and affect. Her behavior is normal. Judgment and thought content normal.       Assessment:       Diagnosis Orders   1. Essential hypertension  EKG 12 Lead      2. Palpitations        3. Hyperlipidemia, unspecified hyperlipidemia type                Plan:      Dizziness and palpitations. Resolved as She is taking toprol xl 25 mg daily in am and 12.5 mg in the evening to treat palpitations. Chest and shoulder pain:  MPI was normal in 5/2018. history of takosubu CMP from 1 2014 that has since resolved. Claudication:  She quit smoking and has back pain with scoliosis and leg pain was getting sclerotherapy for LE varicosities. I recommended seeing a vascular surgeon. Acid reflux: takes tums     Takusubo CMP is resolved.

## 2022-12-09 PROBLEM — Z00.00 ROUTINE GENERAL MEDICAL EXAMINATION AT A HEALTH CARE FACILITY: Status: RESOLVED | Noted: 2019-05-20 | Resolved: 2022-12-09

## 2022-12-29 DIAGNOSIS — F33.2 SEVERE EPISODE OF RECURRENT MAJOR DEPRESSIVE DISORDER, WITHOUT PSYCHOTIC FEATURES (HCC): ICD-10-CM

## 2022-12-29 NOTE — TELEPHONE ENCOUNTER
Requested Prescriptions     Pending Prescriptions Disp Refills    metoprolol tartrate (LOPRESSOR) 25 MG tablet [Pharmacy Med Name: METOPROLOL TARTRATE 25 MG TAB] 135 tablet      Sig: TAKE ONE TABLET BY MOUTH EVERY MORNING AND ONE-HALF TABLET EVERY EVENING            Last Office Visit: 12/1/2022     Next Office Visit: 5/19/2023

## 2023-01-05 RX ORDER — BUPROPION HYDROCHLORIDE 150 MG/1
TABLET ORAL
Qty: 30 TABLET | Refills: 3 | Status: SHIPPED | OUTPATIENT
Start: 2023-01-05

## 2023-01-30 RX ORDER — LISINOPRIL 2.5 MG/1
TABLET ORAL
Qty: 90 TABLET | Refills: 0 | Status: SHIPPED | OUTPATIENT
Start: 2023-01-30

## 2023-04-12 ENCOUNTER — TELEPHONE (OUTPATIENT)
Dept: FAMILY MEDICINE CLINIC | Age: 68
End: 2023-04-12

## 2023-04-14 ENCOUNTER — TELEPHONE (OUTPATIENT)
Dept: FAMILY MEDICINE CLINIC | Age: 68
End: 2023-04-14

## 2023-04-17 ENCOUNTER — OFFICE VISIT (OUTPATIENT)
Dept: FAMILY MEDICINE CLINIC | Age: 68
End: 2023-04-17
Payer: COMMERCIAL

## 2023-04-17 ENCOUNTER — TELEPHONE (OUTPATIENT)
Dept: ORTHOPEDIC SURGERY | Age: 68
End: 2023-04-17

## 2023-04-17 VITALS
HEIGHT: 59 IN | HEART RATE: 60 BPM | DIASTOLIC BLOOD PRESSURE: 80 MMHG | BODY MASS INDEX: 35.36 KG/M2 | SYSTOLIC BLOOD PRESSURE: 126 MMHG | TEMPERATURE: 97.3 F | OXYGEN SATURATION: 98 % | WEIGHT: 175.4 LBS

## 2023-04-17 DIAGNOSIS — I42.8 NON-ISCHEMIC CARDIOMYOPATHY (HCC): ICD-10-CM

## 2023-04-17 DIAGNOSIS — E66.01 MORBID OBESITY DUE TO EXCESS CALORIES (HCC): ICD-10-CM

## 2023-04-17 DIAGNOSIS — M54.12 CERVICAL RADICULOPATHY: Primary | ICD-10-CM

## 2023-04-17 DIAGNOSIS — I42.0 DILATED CARDIOMYOPATHY (HCC): ICD-10-CM

## 2023-04-17 PROCEDURE — 3074F SYST BP LT 130 MM HG: CPT | Performed by: NURSE PRACTITIONER

## 2023-04-17 PROCEDURE — 1123F ACP DISCUSS/DSCN MKR DOCD: CPT | Performed by: NURSE PRACTITIONER

## 2023-04-17 PROCEDURE — 3078F DIAST BP <80 MM HG: CPT | Performed by: NURSE PRACTITIONER

## 2023-04-17 PROCEDURE — 99214 OFFICE O/P EST MOD 30 MIN: CPT | Performed by: NURSE PRACTITIONER

## 2023-04-17 RX ORDER — PREDNISONE 20 MG/1
20 TABLET ORAL DAILY
Qty: 10 TABLET | Refills: 0 | Status: SHIPPED | OUTPATIENT
Start: 2023-04-17 | End: 2023-04-27

## 2023-04-17 SDOH — ECONOMIC STABILITY: FOOD INSECURITY: WITHIN THE PAST 12 MONTHS, YOU WORRIED THAT YOUR FOOD WOULD RUN OUT BEFORE YOU GOT MONEY TO BUY MORE.: NEVER TRUE

## 2023-04-17 SDOH — ECONOMIC STABILITY: HOUSING INSECURITY
IN THE LAST 12 MONTHS, WAS THERE A TIME WHEN YOU DID NOT HAVE A STEADY PLACE TO SLEEP OR SLEPT IN A SHELTER (INCLUDING NOW)?: NO

## 2023-04-17 SDOH — ECONOMIC STABILITY: FOOD INSECURITY: WITHIN THE PAST 12 MONTHS, THE FOOD YOU BOUGHT JUST DIDN'T LAST AND YOU DIDN'T HAVE MONEY TO GET MORE.: NEVER TRUE

## 2023-04-17 SDOH — ECONOMIC STABILITY: INCOME INSECURITY: HOW HARD IS IT FOR YOU TO PAY FOR THE VERY BASICS LIKE FOOD, HOUSING, MEDICAL CARE, AND HEATING?: NOT HARD AT ALL

## 2023-04-17 ASSESSMENT — PATIENT HEALTH QUESTIONNAIRE - PHQ9
6. FEELING BAD ABOUT YOURSELF - OR THAT YOU ARE A FAILURE OR HAVE LET YOURSELF OR YOUR FAMILY DOWN: 0
3. TROUBLE FALLING OR STAYING ASLEEP: 0
10. IF YOU CHECKED OFF ANY PROBLEMS, HOW DIFFICULT HAVE THESE PROBLEMS MADE IT FOR YOU TO DO YOUR WORK, TAKE CARE OF THINGS AT HOME, OR GET ALONG WITH OTHER PEOPLE: 0
9. THOUGHTS THAT YOU WOULD BE BETTER OFF DEAD, OR OF HURTING YOURSELF: 0
SUM OF ALL RESPONSES TO PHQ9 QUESTIONS 1 & 2: 0
2. FEELING DOWN, DEPRESSED OR HOPELESS: 0
SUM OF ALL RESPONSES TO PHQ QUESTIONS 1-9: 0
SUM OF ALL RESPONSES TO PHQ QUESTIONS 1-9: 0
7. TROUBLE CONCENTRATING ON THINGS, SUCH AS READING THE NEWSPAPER OR WATCHING TELEVISION: 0
5. POOR APPETITE OR OVEREATING: 0
1. LITTLE INTEREST OR PLEASURE IN DOING THINGS: 0
SUM OF ALL RESPONSES TO PHQ QUESTIONS 1-9: 0
8. MOVING OR SPEAKING SO SLOWLY THAT OTHER PEOPLE COULD HAVE NOTICED. OR THE OPPOSITE, BEING SO FIGETY OR RESTLESS THAT YOU HAVE BEEN MOVING AROUND A LOT MORE THAN USUAL: 0
SUM OF ALL RESPONSES TO PHQ QUESTIONS 1-9: 0
4. FEELING TIRED OR HAVING LITTLE ENERGY: 0

## 2023-04-17 NOTE — PROGRESS NOTES
Behavior: Behavior normal.         An electronic signature was used to authenticate this note.     --HEATHER Jimenez - CNP

## 2023-04-17 NOTE — ASSESSMENT & PLAN NOTE
Take prednisone as prescribed  Can continue tylenol as needed  Try to stretch the arm, neck, and back as tolerated

## 2023-04-17 NOTE — TELEPHONE ENCOUNTER
General Question     Subject: LT SHOULDER  Patient and /or Facility Request: Delfinodeseandonnell Azam  Contact Number:  [phone]    PATIENT CALLING REGARDING SHE'S HAVING A ACHY DULL FEELING IN HER LT SHOULDER GOING DOWN HER ARM. PATIENT WOULD LIKE TO COME IN TODAY. SHE DID NOT GO TO WORK TODAY, HER JOB REQUIRES LIFTING. PLEASE CALL BACK PATIENT AT THE ABOVE NUMBER.

## 2023-04-18 RX ORDER — LISINOPRIL 2.5 MG/1
TABLET ORAL
Qty: 90 TABLET | Refills: 0 | Status: SHIPPED | OUTPATIENT
Start: 2023-04-18

## 2023-04-19 ENCOUNTER — TELEPHONE (OUTPATIENT)
Dept: FAMILY MEDICINE CLINIC | Age: 68
End: 2023-04-19

## 2023-04-20 ENCOUNTER — OFFICE VISIT (OUTPATIENT)
Dept: FAMILY MEDICINE CLINIC | Age: 68
End: 2023-04-20
Payer: COMMERCIAL

## 2023-04-20 VITALS
OXYGEN SATURATION: 97 % | DIASTOLIC BLOOD PRESSURE: 72 MMHG | TEMPERATURE: 98.3 F | WEIGHT: 174 LBS | SYSTOLIC BLOOD PRESSURE: 122 MMHG | HEIGHT: 59 IN | BODY MASS INDEX: 35.08 KG/M2 | HEART RATE: 67 BPM

## 2023-04-20 DIAGNOSIS — M54.12 CERVICAL RADICULOPATHY: Primary | ICD-10-CM

## 2023-04-20 DIAGNOSIS — M47.22 OSTEOARTHRITIS OF SPINE WITH RADICULOPATHY, CERVICAL REGION: ICD-10-CM

## 2023-04-20 PROCEDURE — 3078F DIAST BP <80 MM HG: CPT | Performed by: NURSE PRACTITIONER

## 2023-04-20 PROCEDURE — 3074F SYST BP LT 130 MM HG: CPT | Performed by: NURSE PRACTITIONER

## 2023-04-20 PROCEDURE — 1123F ACP DISCUSS/DSCN MKR DOCD: CPT | Performed by: NURSE PRACTITIONER

## 2023-04-20 PROCEDURE — 99214 OFFICE O/P EST MOD 30 MIN: CPT | Performed by: NURSE PRACTITIONER

## 2023-04-21 ASSESSMENT — ENCOUNTER SYMPTOMS
CONSTIPATION: 0
COUGH: 0
WHEEZING: 0
ABDOMINAL PAIN: 0
DIARRHEA: 0
SINUS PAIN: 0
SINUS PRESSURE: 0
COLOR CHANGE: 0
BACK PAIN: 0
SHORTNESS OF BREATH: 0

## 2023-04-21 NOTE — ASSESSMENT & PLAN NOTE
Following with Ortho  Continue prednisone taper  We will extend letter for work to return on Monday  Call if no better

## 2023-04-21 NOTE — PROGRESS NOTES
Anika Waller (:  1955) is a 69 y.o. female,Established patient, here for evaluation of the following chief complaint(s):  Follow-up (Left shoulder pain, when she presses on middle arm it is very painful, has been taking twice of what is prescribed of prednisone and advil, requesting work note)      ASSESSMENT/PLAN:  1. Osteoarthritis of spine with radiculopathy, cervical region  Assessment & Plan:  Following with Ortho  Continue prednisone taper  We will extend letter for work to return on Monday  Call if no better      No follow-ups on file. SUBJECTIVE/OBJECTIVE:  HPI  Patient is here to follow-up on neck, left shoulder, arm pain. She was here a few days ago for the same. Diagnosed with cervical radiculopathy. Started on prednisone. States that has been helping. She was given a note for work for 3 days and was supposed to return today however she feels that she is not well enough yet to return to work. She still having sharp shooting pains on her left arm does not feel she would be able to do a heavy lifting. She is following with Ortho. Planning for MRI but she requires this to be an open MRI. Looking for please where she had a schedule.     Current Outpatient Medications   Medication Sig Dispense Refill    lisinopril (PRINIVIL;ZESTRIL) 2.5 MG tablet TAKE ONE TABLET BY MOUTH DAILY 90 tablet 0    predniSONE (DELTASONE) 20 MG tablet Take 1 tablet by mouth daily for 10 days 10 tablet 0    buPROPion (WELLBUTRIN XL) 150 MG extended release tablet TAKE ONE TABLET BY MOUTH EVERY MORNING 30 tablet 3    metoprolol tartrate (LOPRESSOR) 25 MG tablet TAKE ONE TABLET BY MOUTH EVERY MORNING AND ONE-HALF TABLET EVERY EVENING 135 tablet 3    atorvastatin (LIPITOR) 10 MG tablet 1 tablet daily 90 tablet 3    Calcium Citrate-Vitamin D (CALCIUM CITRATE CHEWY BITE) 500-500 MG-UNIT CHEW Take by mouth      metoprolol succinate (TOPROL XL) 25 MG extended release tablet Take 1 tablet by mouth daily 90 tablet 3

## 2023-04-26 ENCOUNTER — TELEPHONE (OUTPATIENT)
Dept: FAMILY MEDICINE CLINIC | Age: 68
End: 2023-04-26

## 2023-04-26 NOTE — TELEPHONE ENCOUNTER
Pt's left arm and shoulder are still hurting after taking prednisone  She was told  to call back if not better   She's thinking it may be a pinched nerve, thinks she may need an x ray  Asking for a work note stating she can be off for the rest of the week

## 2023-04-27 ENCOUNTER — OFFICE VISIT (OUTPATIENT)
Dept: FAMILY MEDICINE CLINIC | Age: 68
End: 2023-04-27
Payer: COMMERCIAL

## 2023-04-27 ENCOUNTER — TELEPHONE (OUTPATIENT)
Dept: FAMILY MEDICINE CLINIC | Age: 68
End: 2023-04-27

## 2023-04-27 VITALS
DIASTOLIC BLOOD PRESSURE: 80 MMHG | BODY MASS INDEX: 35.55 KG/M2 | WEIGHT: 176 LBS | OXYGEN SATURATION: 98 % | SYSTOLIC BLOOD PRESSURE: 124 MMHG | TEMPERATURE: 96.9 F | HEART RATE: 75 BPM

## 2023-04-27 DIAGNOSIS — M25.512 ACUTE PAIN OF LEFT SHOULDER: ICD-10-CM

## 2023-04-27 PROCEDURE — 1123F ACP DISCUSS/DSCN MKR DOCD: CPT | Performed by: NURSE PRACTITIONER

## 2023-04-27 PROCEDURE — 3079F DIAST BP 80-89 MM HG: CPT | Performed by: NURSE PRACTITIONER

## 2023-04-27 PROCEDURE — 99214 OFFICE O/P EST MOD 30 MIN: CPT | Performed by: NURSE PRACTITIONER

## 2023-04-27 PROCEDURE — 3074F SYST BP LT 130 MM HG: CPT | Performed by: NURSE PRACTITIONER

## 2023-04-27 NOTE — PROGRESS NOTES
Qing Haney (:  1955) is a 69 y.o. female,Established patient, here for evaluation of the following chief complaint(s):  Arm Pain      ASSESSMENT/PLAN:  1. Acute pain of left shoulder  Assessment & Plan:   [facility]      No follow-ups on file. SUBJECTIVE/OBJECTIVE:    Patient the office today with reports of left arm pain. She states that it starts in her neck radiates to her left shoulder and down her left arm. She works in a [address] and is constantly lifting heavy boxes and using a tape gun. She has been dealing with pain for the past several weeks. She has seen an orthopedic surgeon in the past for back issues and states that this is a progressive situation and may require surgery in the future. She has been off work since the  and needs [facility] paperwork filled out and would like a referral to orthopedic surgery at this time. Current Outpatient Medications   Medication Sig Dispense Refill    lisinopril (PRINIVIL;ZESTRIL) 2.5 MG tablet TAKE ONE TABLET BY MOUTH DAILY 90 tablet 0    predniSONE (DELTASONE) 20 MG tablet Take 1 tablet by mouth daily for 10 days 10 tablet 0    buPROPion (WELLBUTRIN XL) 150 MG extended release tablet TAKE ONE TABLET BY MOUTH EVERY MORNING 30 tablet 3    metoprolol tartrate (LOPRESSOR) 25 MG tablet TAKE ONE TABLET BY MOUTH EVERY MORNING AND ONE-HALF TABLET EVERY EVENING 135 tablet 3    atorvastatin (LIPITOR) 10 MG tablet 1 tablet daily 90 tablet 3    Calcium Citrate-Vitamin D (CALCIUM CITRATE CHEWY BITE) 500-500 MG-UNIT CHEW Take by mouth      metoprolol succinate (TOPROL XL) 25 MG extended release tablet Take 1 tablet by mouth daily 90 tablet 3    calcium carbonate 600 MG TABS tablet Take 1 tablet by mouth daily       No current facility-administered medications for this visit. Review of Systems   All other systems reviewed and are negative.     Vitals:    23 1110   BP: 124/80

## 2023-04-27 NOTE — ASSESSMENT & PLAN NOTE
FMLA paper completed given information for the [facility] walk-in clinic for evaluation

## 2023-05-16 DIAGNOSIS — F33.2 SEVERE EPISODE OF RECURRENT MAJOR DEPRESSIVE DISORDER, WITHOUT PSYCHOTIC FEATURES (HCC): ICD-10-CM

## 2023-05-16 RX ORDER — BUPROPION HYDROCHLORIDE 150 MG/1
TABLET ORAL
Qty: 30 TABLET | Refills: 3 | Status: SHIPPED | OUTPATIENT
Start: 2023-05-16

## 2023-05-23 ENCOUNTER — OFFICE VISIT (OUTPATIENT)
Dept: FAMILY MEDICINE CLINIC | Age: 68
End: 2023-05-23
Payer: COMMERCIAL

## 2023-05-23 VITALS
RESPIRATION RATE: 12 BRPM | BODY MASS INDEX: 35.55 KG/M2 | OXYGEN SATURATION: 97 % | SYSTOLIC BLOOD PRESSURE: 118 MMHG | TEMPERATURE: 97.1 F | WEIGHT: 176 LBS | HEART RATE: 65 BPM | DIASTOLIC BLOOD PRESSURE: 78 MMHG

## 2023-05-23 DIAGNOSIS — M51.36 DDD (DEGENERATIVE DISC DISEASE), LUMBAR: ICD-10-CM

## 2023-05-23 DIAGNOSIS — I87.2 VENOUS INSUFFICIENCY OF BOTH LOWER EXTREMITIES: ICD-10-CM

## 2023-05-23 DIAGNOSIS — E66.01 MORBID OBESITY DUE TO EXCESS CALORIES (HCC): ICD-10-CM

## 2023-05-23 PROBLEM — S39.012A LUMBAR STRAIN: Status: RESOLVED | Noted: 2022-06-03 | Resolved: 2023-05-23

## 2023-05-23 PROBLEM — M25.511 ACUTE PAIN OF RIGHT SHOULDER: Status: RESOLVED | Noted: 2017-07-22 | Resolved: 2023-05-23

## 2023-05-23 PROCEDURE — 3078F DIAST BP <80 MM HG: CPT | Performed by: NURSE PRACTITIONER

## 2023-05-23 PROCEDURE — 1123F ACP DISCUSS/DSCN MKR DOCD: CPT | Performed by: NURSE PRACTITIONER

## 2023-05-23 PROCEDURE — 3074F SYST BP LT 130 MM HG: CPT | Performed by: NURSE PRACTITIONER

## 2023-05-23 PROCEDURE — 99214 OFFICE O/P EST MOD 30 MIN: CPT | Performed by: NURSE PRACTITIONER

## 2023-05-23 RX ORDER — CELECOXIB 200 MG/1
CAPSULE ORAL
COMMUNITY
Start: 2023-04-27

## 2023-05-23 ASSESSMENT — ENCOUNTER SYMPTOMS
SHORTNESS OF BREATH: 0
NAUSEA: 0
COUGH: 0
VOMITING: 0
COLOR CHANGE: 0
EYE ITCHING: 0
ABDOMINAL PAIN: 0
BLOOD IN STOOL: 0
CONSTIPATION: 0
BACK PAIN: 1
CHEST TIGHTNESS: 0
WHEEZING: 0
SORE THROAT: 0
SINUS PRESSURE: 0
RHINORRHEA: 0
DIARRHEA: 0
EYE REDNESS: 0

## 2023-05-23 NOTE — PROGRESS NOTES
Gracie Mahmood (:  1955) is a 69 y.o. female,Established patient, here for evaluation of the following chief complaint(s):  Follow-up ([place] paperwork) and Swelling      ASSESSMENT/PLAN:  1. Morbid obesity due to excess calories [facility])  Assessment & Plan:   Encourage diet and exercise to help with overall health and back pain   2. Venous insufficiency of both lower extremities  Assessment & Plan:   Ongoing swelling, she does not wear compression hose. reassured that swelling does go down in the morning after keeping elevated over night. Swelling is worse when on her feet and in the heat, so continue to keep extremities elevated and use compression stockings,   3. DDD (degenerative disc disease), lumbar  Assessment & Plan:   Ongoing pain, unfortunately she does not have insurance coverage for PT. I think this would benefit her greatly. The fact that pain is exacerbated after being on her feet all day and lifting boxes, she may benefit from a job change or penitentiary, she is going to think about this. No follow-ups on file. SUBJECTIVE/OBJECTIVE:  Patient in the office with reports of ongoing back pain and ankle pain/swelling. She states that her ankle swelling started last night, and the swelling did get better this morning. No new pain, the swelling is bilaterally. She states that she was up on her feet all day yesterday. She is considering retiring and moving on from current job due to lifting heavy boxes all day and standing.      Current Outpatient Medications   Medication Sig Dispense Refill    celecoxib (CELEBREX) 200 MG capsule       buPROPion (WELLBUTRIN XL) 150 MG extended release tablet TAKE ONE TABLET BY MOUTH EVERY MORNING 30 tablet 3    lisinopril (PRINIVIL;ZESTRIL) 2.5 MG tablet TAKE ONE TABLET BY MOUTH DAILY 90 tablet 0    metoprolol tartrate (LOPRESSOR) 25 MG tablet TAKE ONE TABLET BY MOUTH EVERY MORNING AND ONE-HALF TABLET EVERY EVENING 135 tablet 3    atorvastatin (LIPITOR) 10 MG tablet 1

## 2023-05-23 NOTE — ASSESSMENT & PLAN NOTE
Ongoing swelling, she does not wear compression hose. reassured that swelling does go down in the morning after keeping elevated over night.  Swelling is worse when on her feet and in the heat, so continue to keep extremities elevated and use compression stockings,

## 2023-05-23 NOTE — ASSESSMENT & PLAN NOTE
Ongoing pain, unfortunately she does not have insurance coverage for PT. I think this would benefit her greatly. The fact that pain is exacerbated after being on her feet all day and lifting boxes, she may benefit from a job change or [facility], she is going to think about this.

## 2023-06-21 ENCOUNTER — OFFICE VISIT (OUTPATIENT)
Dept: FAMILY MEDICINE CLINIC | Age: 68
End: 2023-06-21
Payer: COMMERCIAL

## 2023-06-21 VITALS
HEART RATE: 73 BPM | TEMPERATURE: 97.6 F | RESPIRATION RATE: 19 BRPM | DIASTOLIC BLOOD PRESSURE: 70 MMHG | SYSTOLIC BLOOD PRESSURE: 116 MMHG | WEIGHT: 178.6 LBS | OXYGEN SATURATION: 98 % | BODY MASS INDEX: 36.07 KG/M2

## 2023-06-21 DIAGNOSIS — M79.89 LEG SWELLING: ICD-10-CM

## 2023-06-21 DIAGNOSIS — M54.12 CERVICAL RADICULOPATHY: ICD-10-CM

## 2023-06-21 PROCEDURE — 1123F ACP DISCUSS/DSCN MKR DOCD: CPT | Performed by: NURSE PRACTITIONER

## 2023-06-21 PROCEDURE — 3074F SYST BP LT 130 MM HG: CPT | Performed by: NURSE PRACTITIONER

## 2023-06-21 PROCEDURE — 99214 OFFICE O/P EST MOD 30 MIN: CPT | Performed by: NURSE PRACTITIONER

## 2023-06-21 PROCEDURE — 3078F DIAST BP <80 MM HG: CPT | Performed by: NURSE PRACTITIONER

## 2023-06-21 ASSESSMENT — ENCOUNTER SYMPTOMS
EYE REDNESS: 0
VOMITING: 0
COUGH: 0
BACK PAIN: 0
COLOR CHANGE: 0
SHORTNESS OF BREATH: 0
ABDOMINAL PAIN: 0
RHINORRHEA: 0
BLOOD IN STOOL: 0
NAUSEA: 0
DIARRHEA: 0
SORE THROAT: 0
CHEST TIGHTNESS: 0
WHEEZING: 0
SINUS PRESSURE: 0
EYE ITCHING: 0
CONSTIPATION: 0

## 2023-06-21 NOTE — PROGRESS NOTES
Amy Villalpando (:  1955) is a 77 y.o. female,Established patient, here for evaluation of the following chief complaint(s):  Joint Pain (swelling)      ASSESSMENT/PLAN:  1. Cervical radiculopathy  Assessment & Plan:   Updated FMLA today in the office   2. Leg swelling  Assessment & Plan:   Reassurance  No evidence of DVT  Non pitting  Swelling just in feet without extension into calves  Keep elevated and compression stockings  Call if symptoms change       No follow-ups on file. SUBJECTIVE/OBJECTIVE:    Patient the office today for swelling of bilateral feet. She states that is just started a few weeks ago and is not causing any pain does not go into ankles or upper legs. Denies any leg swelling pain tenderness or redness. Denies any recent travel surgery or other risk factors for blood clots. She has never had a DVT in the past.  She is not a smoker and is not on any oral contraception. She has been taking all of her medications as prescribed. Denies any chest pain shortness of breath or difficulty breathing. She states that when she keeps her extremities elevated the swelling seems to improve. She also would like to extend her FMLA for her back pain and has paperwork with her today.   Current Outpatient Medications   Medication Sig Dispense Refill    celecoxib (CELEBREX) 200 MG capsule       buPROPion (WELLBUTRIN XL) 150 MG extended release tablet TAKE ONE TABLET BY MOUTH EVERY MORNING 30 tablet 3    lisinopril (PRINIVIL;ZESTRIL) 2.5 MG tablet TAKE ONE TABLET BY MOUTH DAILY 90 tablet 0    metoprolol tartrate (LOPRESSOR) 25 MG tablet TAKE ONE TABLET BY MOUTH EVERY MORNING AND ONE-HALF TABLET EVERY EVENING 135 tablet 3    atorvastatin (LIPITOR) 10 MG tablet 1 tablet daily 90 tablet 3    Calcium Citrate-Vitamin D (CALCIUM CITRATE CHEWY BITE) 500-500 MG-UNIT CHEW Take by mouth      metoprolol succinate (TOPROL XL) 25 MG extended release tablet Take 1 tablet by mouth daily 90 tablet 3    calcium

## 2023-06-21 NOTE — ASSESSMENT & PLAN NOTE
Reassurance  No evidence of DVT  Non pitting  Swelling just in feet without extension into calves  Keep elevated and compression stockings  Call if symptoms change

## 2023-07-17 RX ORDER — LISINOPRIL 2.5 MG/1
TABLET ORAL
Qty: 90 TABLET | Refills: 0 | Status: SHIPPED | OUTPATIENT
Start: 2023-07-17

## 2023-08-28 RX ORDER — LISINOPRIL 2.5 MG/1
TABLET ORAL
Qty: 90 TABLET | Refills: 0 | Status: SHIPPED | OUTPATIENT
Start: 2023-08-28

## 2023-10-04 ENCOUNTER — HOSPITAL ENCOUNTER (OUTPATIENT)
Dept: MAMMOGRAPHY | Age: 68
Discharge: HOME | End: 2023-10-04
Payer: MEDICARE

## 2023-10-04 DIAGNOSIS — Z12.31 VISIT FOR SCREENING MAMMOGRAM: ICD-10-CM

## 2023-10-04 PROCEDURE — 77063 BREAST TOMOSYNTHESIS BI: CPT

## 2023-10-05 DIAGNOSIS — F33.2 SEVERE EPISODE OF RECURRENT MAJOR DEPRESSIVE DISORDER, WITHOUT PSYCHOTIC FEATURES (HCC): ICD-10-CM

## 2023-10-05 RX ORDER — BUPROPION HYDROCHLORIDE 150 MG/1
TABLET ORAL
Qty: 30 TABLET | Refills: 3 | Status: SHIPPED | OUTPATIENT
Start: 2023-10-05

## 2023-10-12 ENCOUNTER — HOSPITAL ENCOUNTER (OUTPATIENT)
Dept: GENERAL RADIOLOGY | Age: 68
Discharge: HOME | End: 2023-10-12

## 2023-10-12 ENCOUNTER — OFFICE VISIT (OUTPATIENT)
Dept: ORTHOPEDIC SURGERY | Age: 68
End: 2023-10-12

## 2023-10-12 DIAGNOSIS — M79.672 LEFT FOOT PAIN: ICD-10-CM

## 2023-10-12 DIAGNOSIS — S92.515A CLOSED NONDISPLACED FRACTURE OF PROXIMAL PHALANX OF LESSER TOE OF LEFT FOOT, INITIAL ENCOUNTER: ICD-10-CM

## 2023-10-12 DIAGNOSIS — M79.672 LEFT FOOT PAIN: Primary | ICD-10-CM

## 2023-10-12 PROCEDURE — 1123F ACP DISCUSS/DSCN MKR DOCD: CPT | Performed by: PHYSICIAN ASSISTANT

## 2023-10-12 PROCEDURE — 73630 X-RAY EXAM OF FOOT: CPT

## 2023-10-12 PROCEDURE — 99204 OFFICE O/P NEW MOD 45 MIN: CPT | Performed by: PHYSICIAN ASSISTANT

## 2023-10-12 NOTE — PROGRESS NOTES
Date:  10/13/2023    Name:  Paulita Lundborg  Address:  [address] [place] [address]    :  1955      Age:   77 y.o.    SSN:  [id number]      Medical Record Number:  [id number]    Reason for Visit:    Chief Complaint    Foot Pain (New patient left foot )    DOS:10/12/2023     HPI: Omer Zhong is a 77 y.o. female here today for evaluation of left foot. 2 days ago she dropped a dinner plate directly onto her left pinky toe. She has had significant pain swelling and bruising ever since. She has been able to bear weight but any type of pressure over the pinky hurts significantly. ROS: Review of systems reviewed from Patient History Form completed today and available in the patient's chart under the Media tab. Past Medical History:   Diagnosis Date    Cardiomyopathy ([address]) 2014    Dx @ [facility]. Chronic back pain     Colon polyp     DDD (degenerative disc disease), lumbar 3/30/2015    Degenerative arthritis of cervical spine     Depression 2014    Essential hypertension, benign     Hyperlipidemia     Osteopenia     Varicose vein         Past Surgical History:   Procedure Laterality Date    COLONOSCOPY      HYSTERECTOMY (CERVIX STATUS UNKNOWN)      OVARY REMOVAL         Family History   Problem Relation Age of Onset    Cancer Father         oral cancer    Breast Cancer Sister         under 48    Breast Cancer Sister         over 48    Colon Cancer Brother     Coronary Art Dis Other     Hypertension Other     Other Other         PVOD       Social History     Socioeconomic History    Marital status:    Tobacco Use    Smoking status: Former     Packs/day: 0.50     Years: 41.00     Additional pack years: 0.00     Total pack years: 20.50     Types: Cigarettes     Start date: 2013     Quit date: 2013     Years since quitting: 10.1    Smokeless tobacco: Never   Vaping Use    Vaping Use: Never used   Substance and Sexual Activity    Alcohol use: No    Drug use:  No

## 2023-10-16 RX ORDER — LISINOPRIL 2.5 MG/1
TABLET ORAL
Qty: 90 TABLET | Refills: 0 | Status: SHIPPED | OUTPATIENT
Start: 2023-10-16

## 2023-10-16 NOTE — TELEPHONE ENCOUNTER
Medication:   Requested Prescriptions     Pending Prescriptions Disp Refills    lisinopril (PRINIVIL;ZESTRIL) 2.5 MG tablet [Pharmacy Med Name: LISINOPRIL 2.5 MG TABLET] 90 tablet 0     Sig: TAKE 1 TABLET BY MOUTH DAILY        Last Filled:      Patient Phone Number: [phone] (home)     Last appt: 6/21/2023   Next appt: Visit date not found    Last OARRS:        No data to display to assess the swallow mechanism; r/o dysphagia.

## 2023-10-17 ENCOUNTER — TELEPHONE (OUTPATIENT)
Dept: ORTHOPEDIC SURGERY | Age: 68
End: 2023-10-17

## 2023-10-17 NOTE — TELEPHONE ENCOUNTER
[facility] ([facility]) radiologist partners calling to give results for L foot XR, results are uploaded in Junaid Coleman

## 2023-10-19 ENCOUNTER — HOSPITAL ENCOUNTER (OUTPATIENT)
Dept: GENERAL RADIOLOGY | Age: 68
Discharge: HOME | End: 2023-10-19
Payer: MEDICARE

## 2023-10-19 ENCOUNTER — OFFICE VISIT (OUTPATIENT)
Dept: ORTHOPEDIC SURGERY | Age: 68
End: 2023-10-19

## 2023-10-19 VITALS — WEIGHT: 178 LBS | HEIGHT: 59 IN | BODY MASS INDEX: 35.88 KG/M2

## 2023-10-19 DIAGNOSIS — S92.515D CLOSED NONDISPLACED FRACTURE OF PROXIMAL PHALANX OF LESSER TOE OF LEFT FOOT WITH ROUTINE HEALING, SUBSEQUENT ENCOUNTER: ICD-10-CM

## 2023-10-19 DIAGNOSIS — M79.672 LEFT FOOT PAIN: ICD-10-CM

## 2023-10-19 DIAGNOSIS — M79.672 LEFT FOOT PAIN: Primary | ICD-10-CM

## 2023-10-19 PROCEDURE — 73630 X-RAY EXAM OF FOOT: CPT

## 2023-10-19 NOTE — PROGRESS NOTES
Follow-up (Left foot )      History of Present Illness:  Ian Foster is a 77 y.o. female here for follow-up regarding her left foot. As a review a week ago she dropped a dinner plate directly onto her left pinky toe. At our last visit she was diagnosed with a pinky toe fracture. She is here today for reevaluation. States that she still has pain at her pinky toe. 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 Medical History:  Patient's medications, allergies, past medical, surgical, social and family histories were reviewed and updated as appropriate. Pertinent items are noted in HPI  Review of systems reviewed from Patient History Form completed today and available in the patient's chart under the Media tab. Vital Signs: There were no vitals filed for this visit. Left foot examination:     Inspection: There is normal alignment. No swelling or ecchymosis is present. Gait: Patient is able to weight-bear without pain. Range of motion: Patient can perform a toe rise without pain. There is full range of motion of the ankle, midfoot, hindfoot and forefoot. Stability: No instability is present. Strength: Normal strength is present. Palpation: Tenderness to palpation base of the pinky toe     Neurovascular: Skin is warm and well-perfused. Sensation normal.         Radiology:       Pertinent imaging was interpreted and reviewed with the patient today, images only - no report available. Left foot x-ray:    AP, oblique and lateral views were obtained  and reviewed of the left foot. Impression: Transverse fracture proximal phalanx left pinky toe      Assessment :  77 y.o. female with left pinky toe fracture, proximal phalanx    Impression:  Encounter Diagnoses   Name Primary?     Left foot pain Yes    Closed nondisplaced fracture of proximal phalanx of lesser toe of left foot with routine healing, subsequent encounter        Office Procedures:  Orders Placed This Encounter

## 2023-10-26 ENCOUNTER — TELEPHONE (OUTPATIENT)
Dept: ORTHOPEDIC SURGERY | Age: 68
End: 2023-10-26

## 2023-10-26 NOTE — TELEPHONE ENCOUNTER
General Question     Subject: LETTER FOR WORK   Patient and /or Facility Request: Paulita Lundborg  Contact Number: [phone]    Pt REQUESTING A LETTER FOR RELEASE TO WORK. Pt HAS A JOB AT [employer], BUT NEEDS THE RELEASE LETTER, SAYING SHE IS OK TO WORK. Pt WAS GOING TO STOP AT , AND ASKED THAT A NOTE BE SENT, AS WELL.

## 2023-10-31 RX ORDER — ATORVASTATIN CALCIUM 10 MG/1
TABLET, FILM COATED ORAL
Qty: 90 TABLET | Refills: 3 | Status: SHIPPED | OUTPATIENT
Start: 2023-10-31

## 2023-11-06 ENCOUNTER — TELEPHONE (OUTPATIENT)
Dept: CARDIOLOGY CLINIC | Age: 68
End: 2023-11-06

## 2023-11-06 ENCOUNTER — OFFICE VISIT (OUTPATIENT)
Dept: CARDIOLOGY CLINIC | Age: 68
End: 2023-11-06
Payer: MEDICARE

## 2023-11-06 VITALS
BODY MASS INDEX: 36.36 KG/M2 | SYSTOLIC BLOOD PRESSURE: 140 MMHG | WEIGHT: 180 LBS | HEART RATE: 76 BPM | DIASTOLIC BLOOD PRESSURE: 94 MMHG

## 2023-11-06 DIAGNOSIS — R00.2 PALPITATION: ICD-10-CM

## 2023-11-06 DIAGNOSIS — I10 PRIMARY HYPERTENSION: ICD-10-CM

## 2023-11-06 DIAGNOSIS — R14.0 BLOATING: ICD-10-CM

## 2023-11-06 DIAGNOSIS — R12 HEART BURN: ICD-10-CM

## 2023-11-06 DIAGNOSIS — R53.83 FATIGUE, UNSPECIFIED TYPE: Primary | ICD-10-CM

## 2023-11-06 DIAGNOSIS — E78.2 MIXED HYPERLIPIDEMIA: ICD-10-CM

## 2023-11-06 DIAGNOSIS — I73.9 PVD (PERIPHERAL VASCULAR DISEASE) (HCC): ICD-10-CM

## 2023-11-06 PROCEDURE — 1123F ACP DISCUSS/DSCN MKR DOCD: CPT | Performed by: NURSE PRACTITIONER

## 2023-11-06 PROCEDURE — 93000 ELECTROCARDIOGRAM COMPLETE: CPT | Performed by: NURSE PRACTITIONER

## 2023-11-06 PROCEDURE — G8427 DOCREV CUR MEDS BY ELIG CLIN: HCPCS | Performed by: NURSE PRACTITIONER

## 2023-11-06 PROCEDURE — 99215 OFFICE O/P EST HI 40 MIN: CPT | Performed by: NURSE PRACTITIONER

## 2023-11-06 PROCEDURE — 3017F COLORECTAL CA SCREEN DOC REV: CPT | Performed by: NURSE PRACTITIONER

## 2023-11-06 PROCEDURE — G8417 CALC BMI ABV UP PARAM F/U: HCPCS | Performed by: NURSE PRACTITIONER

## 2023-11-06 PROCEDURE — 3077F SYST BP >= 140 MM HG: CPT | Performed by: NURSE PRACTITIONER

## 2023-11-06 PROCEDURE — G8399 PT W/DXA RESULTS DOCUMENT: HCPCS | Performed by: NURSE PRACTITIONER

## 2023-11-06 PROCEDURE — G8484 FLU IMMUNIZE NO ADMIN: HCPCS | Performed by: NURSE PRACTITIONER

## 2023-11-06 PROCEDURE — 1036F TOBACCO NON-USER: CPT | Performed by: NURSE PRACTITIONER

## 2023-11-06 PROCEDURE — 1090F PRES/ABSN URINE INCON ASSESS: CPT | Performed by: NURSE PRACTITIONER

## 2023-11-06 PROCEDURE — 3080F DIAST BP >= 90 MM HG: CPT | Performed by: NURSE PRACTITIONER

## 2023-11-06 RX ORDER — LISINOPRIL 2.5 MG/1
TABLET ORAL
Qty: 90 TABLET | Refills: 0 | Status: SHIPPED | OUTPATIENT
Start: 2023-11-06 | End: 2023-11-06 | Stop reason: SDUPTHER

## 2023-11-06 RX ORDER — LISINOPRIL 5 MG/1
5 TABLET ORAL DAILY
Qty: 90 TABLET | Refills: 3 | Status: SHIPPED | OUTPATIENT
Start: 2023-11-06

## 2023-11-06 NOTE — PROGRESS NOTES
CC fatigue    HPI:  77 y.o. patient of Dr Alberto Phillips with palpitations, HTN, HLD, hx takusubo CMP (2014-resolved), and PVD who is here for 1 year follow up and medication refill. She c/o fatigue. She c/o burning and pain in both legs when walking a few feet. Pain is worse in L>R leg. Denies non-healing wounds. She has occasional LE edema. She states she has venous congestion and previously seen by Dr Mauri Hendrix. She also heart burn and abdominal bloating. She denies exertional angina, sob, LH/dizziness, palpitations, syncope or falls. No orthopnea, PND, or early satiety. No n/v/d, fever or GI/ bleeding. BP higher than normal 140/90 and 140/94. She's been taking lisinopril but has been out of metoprolol. Past Medical History:   Diagnosis Date    Cardiomyopathy ([address]) 1/7/2014    Dx @ [address] [facility]. Chronic back pain     Colon polyp     DDD (degenerative disc disease), lumbar 3/30/2015    Degenerative arthritis of cervical spine     Depression 12/16/2014    Essential hypertension, benign     Hyperlipidemia     Osteopenia     Varicose vein      Past Surgical History:   Procedure Laterality Date    COLONOSCOPY      HYSTERECTOMY (CERVIX STATUS UNKNOWN)      OVARY REMOVAL       Family History   Problem Relation Age of Onset    Cancer Father         oral cancer    Breast Cancer Sister         under 48    Breast Cancer Sister         over 48    Colon Cancer Brother     Coronary Art Dis Other     Hypertension Other     Other Other         PVOD     Social History     Tobacco Use    Smoking status: Former     Packs/day: 0.50     Years: 41.00     Additional pack years: 0.00     Total pack years: 20.50     Types: Cigarettes     Start date: 8/12/2013     Quit date: 8/25/2013     Years since quitting: 10.2    Smokeless tobacco: Never   Vaping Use    Vaping Use: Never used   Substance Use Topics    Alcohol use: No    Drug use: No     Allergies:Patient has no known allergies.     Review of Systems  All 12 point review of

## 2023-11-06 NOTE — TELEPHONE ENCOUNTER
Requested Prescriptions     Pending Prescriptions Disp Refills    metoprolol tartrate (LOPRESSOR) 25 MG tablet 90 tablet 0     Sig: Take 1 tablet by mouth daily          Number: 90    Refills: 3    Last Office Visit: 12/1/2022     Next Office Visit: 11/6/2023

## 2023-11-15 DIAGNOSIS — I73.9 CLAUDICATION OF BOTH LOWER EXTREMITIES (HCC): Primary | ICD-10-CM

## 2024-01-02 ENCOUNTER — TELEPHONE (OUTPATIENT)
Dept: VASCULAR SURGERY | Age: 69
End: 2024-01-02

## 2024-01-02 NOTE — TELEPHONE ENCOUNTER
Patient called in stating she has seen Dr. Gunn before and thinks its time for another scan and appointment.     Please call her back at [phone]

## 2024-01-02 NOTE — TELEPHONE ENCOUNTER
Spoke with Ms. Mendosa and informed her her last scan was normal and there was no need for repeat.

## 2024-01-04 ENCOUNTER — TELEPHONE (OUTPATIENT)
Dept: FAMILY MEDICINE CLINIC | Age: 69
End: 2024-01-04

## 2024-01-04 NOTE — TELEPHONE ENCOUNTER
----- Message from Alicia Gamboa sent at 1/4/2024 10:10 AM EST -----  Subject: Referral Request    Reason for referral request? Patient is wanting to get a referral for   rheumatology, please call patient back to discuss. Patient is wanting the   referral for hip pain.   Provider patient wants to be referred to(if known):     Provider Phone Number(if known):    Additional Information for Provider? Patient is wanting to be referred to   [facility] for [facility]ology.  ---------------------------------------------------------------------------  --------------  CALL BACK INFO    [phone]; OK to leave message on voicemail,OK to respond with electronic   message via [employer] portal (only for patients who have registered [employer]   account)  ---------------------------------------------------------------------------  --------------

## 2024-01-04 NOTE — TELEPHONE ENCOUNTER
Zenaida called pt and left detailed message for pt to call her insurance to see who they cover as [facility] is not accepting pts. I advised pt to call that location her insurance gives her to see if they are accepting new pts and give us call back with their fax number

## 2024-02-04 DIAGNOSIS — F33.2 SEVERE EPISODE OF RECURRENT MAJOR DEPRESSIVE DISORDER, WITHOUT PSYCHOTIC FEATURES (HCC): ICD-10-CM

## 2024-02-05 RX ORDER — BUPROPION HYDROCHLORIDE 150 MG/1
TABLET ORAL
Qty: 90 TABLET | Refills: 3 | Status: SHIPPED | OUTPATIENT
Start: 2024-02-05

## 2024-03-27 ENCOUNTER — OFFICE VISIT (OUTPATIENT)
Dept: FAMILY MEDICINE CLINIC | Age: 69
End: 2024-03-27
Payer: MEDICARE

## 2024-03-27 ENCOUNTER — HOSPITAL ENCOUNTER (OUTPATIENT)
Dept: GENERAL RADIOLOGY | Age: 69
Discharge: HOME | End: 2024-03-27
Payer: MEDICARE

## 2024-03-27 VITALS
BODY MASS INDEX: 35.48 KG/M2 | HEIGHT: 59 IN | OXYGEN SATURATION: 99 % | WEIGHT: 176 LBS | SYSTOLIC BLOOD PRESSURE: 118 MMHG | HEART RATE: 66 BPM | TEMPERATURE: 97.1 F | DIASTOLIC BLOOD PRESSURE: 66 MMHG

## 2024-03-27 DIAGNOSIS — Z12.11 COLON CANCER SCREENING: ICD-10-CM

## 2024-03-27 DIAGNOSIS — I10 ESSENTIAL HYPERTENSION: ICD-10-CM

## 2024-03-27 DIAGNOSIS — Z13.820 ENCOUNTER FOR OSTEOPOROSIS SCREENING IN ASYMPTOMATIC POSTMENOPAUSAL PATIENT: ICD-10-CM

## 2024-03-27 DIAGNOSIS — Z00.00 ENCOUNTER FOR ANNUAL WELLNESS VISIT (AWV) IN MEDICARE PATIENT: ICD-10-CM

## 2024-03-27 DIAGNOSIS — E55.9 VITAMIN D DEFICIENCY: ICD-10-CM

## 2024-03-27 DIAGNOSIS — Z87.891 PERSONAL HISTORY OF TOBACCO USE: ICD-10-CM

## 2024-03-27 DIAGNOSIS — D64.9 FATIGUE ASSOCIATED WITH ANEMIA: ICD-10-CM

## 2024-03-27 DIAGNOSIS — K21.9 GASTROESOPHAGEAL REFLUX DISEASE WITHOUT ESOPHAGITIS: ICD-10-CM

## 2024-03-27 DIAGNOSIS — I42.8 NON-ISCHEMIC CARDIOMYOPATHY (HCC): ICD-10-CM

## 2024-03-27 DIAGNOSIS — M43.00 SPONDYLOLYSIS: ICD-10-CM

## 2024-03-27 DIAGNOSIS — I42.0 DILATED CARDIOMYOPATHY (HCC): ICD-10-CM

## 2024-03-27 DIAGNOSIS — Z78.0 ASYMPTOMATIC MENOPAUSAL STATE: ICD-10-CM

## 2024-03-27 DIAGNOSIS — Z78.0 ENCOUNTER FOR OSTEOPOROSIS SCREENING IN ASYMPTOMATIC POSTMENOPAUSAL PATIENT: ICD-10-CM

## 2024-03-27 DIAGNOSIS — M25.552 LEFT HIP PAIN: ICD-10-CM

## 2024-03-27 DIAGNOSIS — M25.552 LEFT HIP PAIN: Primary | ICD-10-CM

## 2024-03-27 DIAGNOSIS — E66.01 MORBID OBESITY DUE TO EXCESS CALORIES (HCC): ICD-10-CM

## 2024-03-27 DIAGNOSIS — E74.89 OTHER SPECIFIED DISORDERS OF CARBOHYDRATE METABOLISM (HCC): ICD-10-CM

## 2024-03-27 PROCEDURE — G8484 FLU IMMUNIZE NO ADMIN: HCPCS | Performed by: NURSE PRACTITIONER

## 2024-03-27 PROCEDURE — G8427 DOCREV CUR MEDS BY ELIG CLIN: HCPCS | Performed by: NURSE PRACTITIONER

## 2024-03-27 PROCEDURE — 1123F ACP DISCUSS/DSCN MKR DOCD: CPT | Performed by: NURSE PRACTITIONER

## 2024-03-27 PROCEDURE — 1090F PRES/ABSN URINE INCON ASSESS: CPT | Performed by: NURSE PRACTITIONER

## 2024-03-27 PROCEDURE — 1036F TOBACCO NON-USER: CPT | Performed by: NURSE PRACTITIONER

## 2024-03-27 PROCEDURE — G8399 PT W/DXA RESULTS DOCUMENT: HCPCS | Performed by: NURSE PRACTITIONER

## 2024-03-27 PROCEDURE — G8417 CALC BMI ABV UP PARAM F/U: HCPCS | Performed by: NURSE PRACTITIONER

## 2024-03-27 PROCEDURE — 73502 X-RAY EXAM HIP UNI 2-3 VIEWS: CPT

## 2024-03-27 PROCEDURE — G0296 VISIT TO DETERM LDCT ELIG: HCPCS | Performed by: NURSE PRACTITIONER

## 2024-03-27 PROCEDURE — 99214 OFFICE O/P EST MOD 30 MIN: CPT | Performed by: NURSE PRACTITIONER

## 2024-03-27 PROCEDURE — 3017F COLORECTAL CA SCREEN DOC REV: CPT | Performed by: NURSE PRACTITIONER

## 2024-03-27 PROCEDURE — 3078F DIAST BP <80 MM HG: CPT | Performed by: NURSE PRACTITIONER

## 2024-03-27 PROCEDURE — G0438 PPPS, INITIAL VISIT: HCPCS | Performed by: NURSE PRACTITIONER

## 2024-03-27 PROCEDURE — 3074F SYST BP LT 130 MM HG: CPT | Performed by: NURSE PRACTITIONER

## 2024-03-27 RX ORDER — PREDNISONE 20 MG/1
20 TABLET ORAL DAILY
Qty: 10 TABLET | Refills: 0 | Status: SHIPPED | OUTPATIENT
Start: 2024-03-27 | End: 2024-04-06

## 2024-03-27 RX ORDER — OMEPRAZOLE 20 MG/1
20 CAPSULE, DELAYED RELEASE ORAL
Qty: 30 CAPSULE | Refills: 0 | Status: SHIPPED | OUTPATIENT
Start: 2024-03-27

## 2024-03-27 ASSESSMENT — PATIENT HEALTH QUESTIONNAIRE - PHQ9
SUM OF ALL RESPONSES TO PHQ9 QUESTIONS 1 & 2: 0
SUM OF ALL RESPONSES TO PHQ QUESTIONS 1-9: 0
SUM OF ALL RESPONSES TO PHQ QUESTIONS 1-9: 0
9. THOUGHTS THAT YOU WOULD BE BETTER OFF DEAD, OR OF HURTING YOURSELF: NOT AT ALL
2. FEELING DOWN, DEPRESSED OR HOPELESS: NOT AT ALL
4. FEELING TIRED OR HAVING LITTLE ENERGY: NOT AT ALL
SUM OF ALL RESPONSES TO PHQ QUESTIONS 1-9: 0
6. FEELING BAD ABOUT YOURSELF - OR THAT YOU ARE A FAILURE OR HAVE LET YOURSELF OR YOUR FAMILY DOWN: NOT AT ALL
SUM OF ALL RESPONSES TO PHQ QUESTIONS 1-9: 0
4. FEELING TIRED OR HAVING LITTLE ENERGY: NOT AT ALL
10. IF YOU CHECKED OFF ANY PROBLEMS, HOW DIFFICULT HAVE THESE PROBLEMS MADE IT FOR YOU TO DO YOUR WORK, TAKE CARE OF THINGS AT HOME, OR GET ALONG WITH OTHER PEOPLE: NOT DIFFICULT AT ALL
SUM OF ALL RESPONSES TO PHQ QUESTIONS 1-9: 0
1. LITTLE INTEREST OR PLEASURE IN DOING THINGS: NOT AT ALL
6. FEELING BAD ABOUT YOURSELF - OR THAT YOU ARE A FAILURE OR HAVE LET YOURSELF OR YOUR FAMILY DOWN: NOT AT ALL
SUM OF ALL RESPONSES TO PHQ QUESTIONS 1-9: 0
8. MOVING OR SPEAKING SO SLOWLY THAT OTHER PEOPLE COULD HAVE NOTICED. OR THE OPPOSITE, BEING SO FIGETY OR RESTLESS THAT YOU HAVE BEEN MOVING AROUND A LOT MORE THAN USUAL: NOT AT ALL
SUM OF ALL RESPONSES TO PHQ QUESTIONS 1-9: 0
SUM OF ALL RESPONSES TO PHQ9 QUESTIONS 1 & 2: 0
SUM OF ALL RESPONSES TO PHQ QUESTIONS 1-9: 0
5. POOR APPETITE OR OVEREATING: NOT AT ALL
10. IF YOU CHECKED OFF ANY PROBLEMS, HOW DIFFICULT HAVE THESE PROBLEMS MADE IT FOR YOU TO DO YOUR WORK, TAKE CARE OF THINGS AT HOME, OR GET ALONG WITH OTHER PEOPLE: NOT DIFFICULT AT ALL
7. TROUBLE CONCENTRATING ON THINGS, SUCH AS READING THE NEWSPAPER OR WATCHING TELEVISION: NOT AT ALL
5. POOR APPETITE OR OVEREATING: NOT AT ALL
9. THOUGHTS THAT YOU WOULD BE BETTER OFF DEAD, OR OF HURTING YOURSELF: NOT AT ALL
1. LITTLE INTEREST OR PLEASURE IN DOING THINGS: NOT AT ALL
3. TROUBLE FALLING OR STAYING ASLEEP: NOT AT ALL
7. TROUBLE CONCENTRATING ON THINGS, SUCH AS READING THE NEWSPAPER OR WATCHING TELEVISION: NOT AT ALL
3. TROUBLE FALLING OR STAYING ASLEEP: NOT AT ALL
8. MOVING OR SPEAKING SO SLOWLY THAT OTHER PEOPLE COULD HAVE NOTICED. OR THE OPPOSITE, BEING SO FIGETY OR RESTLESS THAT YOU HAVE BEEN MOVING AROUND A LOT MORE THAN USUAL: NOT AT ALL
2. FEELING DOWN, DEPRESSED OR HOPELESS: NOT AT ALL

## 2024-03-27 ASSESSMENT — LIFESTYLE VARIABLES
HOW MANY STANDARD DRINKS CONTAINING ALCOHOL DO YOU HAVE ON A TYPICAL DAY: PATIENT DOES NOT DRINK
HOW OFTEN DO YOU HAVE A DRINK CONTAINING ALCOHOL: NEVER

## 2024-03-27 NOTE — PROGRESS NOTES
Medicare Annual Wellness Visit  Name: Cami Mendosa Today’s Date: 3/28/2024   MRN: [id number] Sex: Female   Age: 68 y.o. Ethnicity: Non- / Non    : 1955 Race: White (non-)      Cami Mendosa is here for Discuss Medications  Patient in the office for a variety of concerns today and also for the completion of her AWV. She reports left hip pain for the past 2 weeks.She walks \"funny\" because of the pain. Told she has spondyliosis. She has not seen ortho. She is interested in seeing [facility] back and spine for further guidance. She is also reporting GERD symptoms.  She has been taking [place] chews daily that helps sometimes.  She denies any vomiting or blood in her stool.  She is now retired from her previous employer, trying to find new employment for finances. Currently on medicare and SSI.   Screenings for behavioral, psychosocial and functional/safety risks, and cognitive dysfunction are all negative except as indicated below. These results, as well as other patient data from the Health Risk Assessment form, are documented in Flowsheets linked to this Encounter.    No Known Allergies      Prior to Visit Medications    Medication Sig Taking? Authorizing Provider   omeprazole (PRILOSEC) 20 MG delayed release capsule Take 1 capsule by mouth every morning (before breakfast) Yes Mary Berman APRN - CNP   predniSONE (DELTASONE) 20 MG tablet Take 1 tablet by mouth daily for 10 days Yes Mary Berman APRN - CNP   lisinopril (PRINIVIL;ZESTRIL) 5 MG tablet Take 1 tablet by mouth daily Yes Jocelyne Francis APRN - CNP   metoprolol tartrate (LOPRESSOR) 25 MG tablet Take 1 tablet by mouth daily Yes Jocelyne Francis APRN - CNP   atorvastatin (LIPITOR) 10 MG tablet TAKE ONE TABLET BY MOUTH DAILY Yes Mary Berman APRN - CNP   Calcium Citrate-Vitamin D (CALCIUM CITRATE CHEWY BITE) 500-500 MG-UNIT CHEW Take by mouth Yes Provider, MD Tierra   buPROPion (WELLBUTRIN XL)

## 2024-03-27 NOTE — PATIENT INSTRUCTIONS
follow-up, he or she will help you understand what to do next.  After a lung cancer screening, you can go back to your usual activities right away.  A lung cancer screening test can't tell if you have lung cancer. If your results are positive, your doctor can't tell whether an abnormal finding is a harmless nodule, cancer, or something else without doing more tests.  What can you do to help prevent lung cancer?  Some lung cancers can't be prevented. But if you smoke, quitting smoking is the best step you can take to prevent lung cancer. If you want to quit, your doctor can recommend medicines or other ways to help.  Follow-up care is a key part of your treatment and safety. Be sure to make and go to all appointments, and call your doctor if you are having problems. It's also a good idea to know your test results and keep a list of the medicines you take.  Where can you learn more?  Go to https://www.[employer].net/patientEd and enter Q940 to learn more about \"Learning About Lung Cancer Screening.\"  Current as of: October 25, 2023               Content Version: 14.0  © 8890-3258 [employer].   Care instructions adapted under license by [employer]. If you have questions about a medical condition or this instruction, always ask your healthcare professional. [employer] disclaims any warranty or liability for your use of this information.

## 2024-03-28 PROBLEM — Z87.891 PERSONAL HISTORY OF TOBACCO USE: Status: ACTIVE | Noted: 2024-03-28

## 2024-03-28 PROBLEM — K21.9 GASTROESOPHAGEAL REFLUX DISEASE WITHOUT ESOPHAGITIS: Status: ACTIVE | Noted: 2024-03-28

## 2024-03-28 PROBLEM — M25.552 LEFT HIP PAIN: Status: ACTIVE | Noted: 2024-03-28

## 2024-03-28 PROBLEM — Z78.0 ASYMPTOMATIC MENOPAUSAL STATE: Status: ACTIVE | Noted: 2024-03-28

## 2024-03-28 PROBLEM — M79.89 LEG SWELLING: Status: RESOLVED | Noted: 2023-06-21 | Resolved: 2024-03-28

## 2024-03-28 PROBLEM — M43.00 SPONDYLOLYSIS: Status: ACTIVE | Noted: 2024-03-28

## 2024-03-28 PROBLEM — Z12.11 COLON CANCER SCREENING: Status: ACTIVE | Noted: 2024-03-28

## 2024-03-28 ASSESSMENT — ENCOUNTER SYMPTOMS
BLOOD IN STOOL: 0
VOMITING: 0
SINUS PRESSURE: 0
EYE ITCHING: 0
CONSTIPATION: 0
EYE REDNESS: 0
CHEST TIGHTNESS: 0
DIARRHEA: 0
SORE THROAT: 0
RHINORRHEA: 0
SHORTNESS OF BREATH: 0
ABDOMINAL PAIN: 0
COLOR CHANGE: 0
NAUSEA: 0
BACK PAIN: 0
WHEEZING: 0
COUGH: 0

## 2024-03-29 DIAGNOSIS — D64.9 FATIGUE ASSOCIATED WITH ANEMIA: ICD-10-CM

## 2024-03-29 DIAGNOSIS — I10 ESSENTIAL HYPERTENSION: ICD-10-CM

## 2024-03-29 DIAGNOSIS — E55.9 VITAMIN D DEFICIENCY: ICD-10-CM

## 2024-03-29 DIAGNOSIS — E74.89 OTHER SPECIFIED DISORDERS OF CARBOHYDRATE METABOLISM (HCC): ICD-10-CM

## 2024-03-29 LAB
25(OH)D3 SERPL-MCNC: 38 NG/ML
ALBUMIN SERPL-MCNC: 4.4 G/DL (ref 3.4–5)
ALBUMIN/GLOB SERPL: 2 {RATIO} (ref 1.1–2.2)
ALP SERPL-CCNC: 87 U/L (ref 40–129)
ALT SERPL-CCNC: 23 U/L (ref 10–40)
ANION GAP SERPL CALCULATED.3IONS-SCNC: 11 MMOL/L (ref 3–16)
AST SERPL-CCNC: 20 U/L (ref 15–37)
BILIRUB SERPL-MCNC: 0.5 MG/DL (ref 0–1)
BUN SERPL-MCNC: 24 MG/DL (ref 7–20)
CALCIUM SERPL-MCNC: 9.8 MG/DL (ref 8.3–10.6)
CHLORIDE SERPL-SCNC: 104 MMOL/L (ref 99–110)
CHOLEST SERPL-MCNC: 186 MG/DL (ref 0–199)
CO2 SERPL-SCNC: 28 MMOL/L (ref 21–32)
CREAT SERPL-MCNC: 0.9 MG/DL (ref 0.6–1.2)
DEPRECATED RDW RBC AUTO: 13.4 % (ref 12.4–15.4)
FERRITIN SERPL IA-MCNC: 171.5 NG/ML (ref 15–150)
GFR SERPLBLD CREATININE-BSD FMLA CKD-EPI: 68 ML/MIN/{1.73_M2}
GLUCOSE SERPL-MCNC: 90 MG/DL (ref 70–99)
HCT VFR BLD AUTO: 39.7 % (ref 36–48)
HDLC SERPL-MCNC: 57 MG/DL (ref 40–60)
HGB BLD-MCNC: 13.6 G/DL (ref 12–16)
IRON SATN MFR SERPL: 31 % (ref 15–50)
IRON SERPL-MCNC: 93 UG/DL (ref 37–145)
LDL CHOLESTEROL CALCULATED: 100 MG/DL
MCH RBC QN AUTO: 30.9 PG (ref 26–34)
MCHC RBC AUTO-ENTMCNC: 34.3 G/DL (ref 31–36)
MCV RBC AUTO: 90.1 FL (ref 80–100)
PLATELET # BLD AUTO: 202 K/UL (ref 135–450)
PMV BLD AUTO: 9.4 FL (ref 5–10.5)
POTASSIUM SERPL-SCNC: 4.9 MMOL/L (ref 3.5–5.1)
PROT SERPL-MCNC: 6.6 G/DL (ref 6.4–8.2)
RBC # BLD AUTO: 4.4 M/UL (ref 4–5.2)
SODIUM SERPL-SCNC: 143 MMOL/L (ref 136–145)
TIBC SERPL-MCNC: 303 UG/DL (ref 260–445)
TRIGL SERPL-MCNC: 144 MG/DL (ref 0–150)
TSH SERPL DL<=0.005 MIU/L-ACNC: 1.9 UIU/ML (ref 0.27–4.2)
VLDLC SERPL CALC-MCNC: 29 MG/DL
WBC # BLD AUTO: 6.5 K/UL (ref 4–11)

## 2024-03-30 LAB
EST. AVERAGE GLUCOSE BLD GHB EST-MCNC: 108.3 MG/DL
HBA1C MFR BLD: 5.4 %

## 2024-04-01 ENCOUNTER — HOSPITAL ENCOUNTER (OUTPATIENT)
Dept: GENERAL RADIOLOGY | Age: 69
Discharge: HOME | End: 2024-04-01
Payer: MEDICARE

## 2024-04-01 DIAGNOSIS — Z78.0 ASYMPTOMATIC MENOPAUSAL STATE: ICD-10-CM

## 2024-04-01 DIAGNOSIS — Z12.11 COLON CANCER SCREENING: ICD-10-CM

## 2024-04-01 DIAGNOSIS — Z00.00 ENCOUNTER FOR ANNUAL WELLNESS VISIT (AWV) IN MEDICARE PATIENT: ICD-10-CM

## 2024-04-01 PROCEDURE — 77080 DXA BONE DENSITY AXIAL: CPT

## 2024-04-08 ENCOUNTER — OFFICE VISIT (OUTPATIENT)
Dept: ORTHOPEDIC SURGERY | Age: 69
End: 2024-04-08
Payer: MEDICARE

## 2024-04-08 VITALS — WEIGHT: 176 LBS | BODY MASS INDEX: 35.48 KG/M2 | HEIGHT: 59 IN

## 2024-04-08 DIAGNOSIS — M43.10 DEGENERATIVE SPONDYLOLISTHESIS: ICD-10-CM

## 2024-04-08 DIAGNOSIS — M54.50 LOW BACK PAIN, UNSPECIFIED BACK PAIN LATERALITY, UNSPECIFIED CHRONICITY, UNSPECIFIED WHETHER SCIATICA PRESENT: Primary | ICD-10-CM

## 2024-04-08 DIAGNOSIS — M43.16 SPONDYLOLISTHESIS, LUMBAR REGION: ICD-10-CM

## 2024-04-08 DIAGNOSIS — M50.30 DDD (DEGENERATIVE DISC DISEASE), CERVICAL: ICD-10-CM

## 2024-04-08 PROCEDURE — 3017F COLORECTAL CA SCREEN DOC REV: CPT | Performed by: INTERNAL MEDICINE

## 2024-04-08 PROCEDURE — 1123F ACP DISCUSS/DSCN MKR DOCD: CPT | Performed by: INTERNAL MEDICINE

## 2024-04-08 PROCEDURE — G8399 PT W/DXA RESULTS DOCUMENT: HCPCS | Performed by: INTERNAL MEDICINE

## 2024-04-08 PROCEDURE — 99214 OFFICE O/P EST MOD 30 MIN: CPT | Performed by: INTERNAL MEDICINE

## 2024-04-08 PROCEDURE — G8417 CALC BMI ABV UP PARAM F/U: HCPCS | Performed by: INTERNAL MEDICINE

## 2024-04-08 PROCEDURE — G8427 DOCREV CUR MEDS BY ELIG CLIN: HCPCS | Performed by: INTERNAL MEDICINE

## 2024-04-08 PROCEDURE — 1090F PRES/ABSN URINE INCON ASSESS: CPT | Performed by: INTERNAL MEDICINE

## 2024-04-08 PROCEDURE — 1036F TOBACCO NON-USER: CPT | Performed by: INTERNAL MEDICINE

## 2024-04-08 RX ORDER — TIZANIDINE 4 MG/1
4 TABLET ORAL 3 TIMES DAILY PRN
Qty: 30 TABLET | Refills: 1 | Status: SHIPPED | OUTPATIENT
Start: 2024-04-08

## 2024-04-08 RX ORDER — CELECOXIB 200 MG/1
200 CAPSULE ORAL DAILY PRN
Qty: 30 CAPSULE | Refills: 1 | Status: SHIPPED | OUTPATIENT
Start: 2024-04-08

## 2024-04-08 NOTE — PROGRESS NOTES
the differential diagnosis was discussed. The patient was thoroughly informed and all questions were answered. the patient indicated understanding and satisfaction with the discussion.      Orders:        Orders Placed This Encounter   Procedures    XR LUMBAR SPINE (2-3 VIEWS)     Standing Status:   Future     Number of Occurrences:   1     Standing Expiration Date:   5/5/2024     Order Specific Question:   Reason for exam:     Answer:   pain    MRI LUMBAR SPINE WO CONTRAST     Standing Status:   Future     Standing Expiration Date:   4/8/2025     Scheduling Instructions:      [facility], please contact pt to schedule, [phone]      [facility] will obtain auth and fwd to your facility.      Pt advised to f/u in clinic 2-3 days after MRI for results.     Order Specific Question:   Reason for exam:     Answer:   R/O HNP/ Stenosis     Order Specific Question:   What is the sedation requirement?     Answer:   None    [facility] (Bariatric Surgery)Elvira     Referral Priority:   Routine     Referral Type:   Eval and Treat     Referral Reason:   Specialty Services Required     Requested Specialty:   Bariatric Surgery     Number of Visits Requested:   1           Disclaimer:    \"This note was dictated with voice recognition software. Though review and correction are routine, we apologize for any errors.\"

## 2024-04-15 ENCOUNTER — OFFICE VISIT (OUTPATIENT)
Dept: FAMILY MEDICINE CLINIC | Age: 69
End: 2024-04-15
Payer: MEDICARE

## 2024-04-15 VITALS
OXYGEN SATURATION: 99 % | HEIGHT: 59 IN | DIASTOLIC BLOOD PRESSURE: 82 MMHG | HEART RATE: 58 BPM | TEMPERATURE: 98.2 F | SYSTOLIC BLOOD PRESSURE: 130 MMHG | WEIGHT: 176 LBS | BODY MASS INDEX: 35.48 KG/M2

## 2024-04-15 DIAGNOSIS — J01.10 ACUTE NON-RECURRENT FRONTAL SINUSITIS: Primary | ICD-10-CM

## 2024-04-15 LAB
INFLUENZA A ANTIBODY: NEGATIVE
INFLUENZA B ANTIBODY: NEGATIVE

## 2024-04-15 PROCEDURE — 3075F SYST BP GE 130 - 139MM HG: CPT | Performed by: NURSE PRACTITIONER

## 2024-04-15 PROCEDURE — 1123F ACP DISCUSS/DSCN MKR DOCD: CPT | Performed by: NURSE PRACTITIONER

## 2024-04-15 PROCEDURE — 3017F COLORECTAL CA SCREEN DOC REV: CPT | Performed by: NURSE PRACTITIONER

## 2024-04-15 PROCEDURE — 3079F DIAST BP 80-89 MM HG: CPT | Performed by: NURSE PRACTITIONER

## 2024-04-15 PROCEDURE — G8417 CALC BMI ABV UP PARAM F/U: HCPCS | Performed by: NURSE PRACTITIONER

## 2024-04-15 PROCEDURE — G8427 DOCREV CUR MEDS BY ELIG CLIN: HCPCS | Performed by: NURSE PRACTITIONER

## 2024-04-15 PROCEDURE — 87804 INFLUENZA ASSAY W/OPTIC: CPT | Performed by: NURSE PRACTITIONER

## 2024-04-15 PROCEDURE — 1036F TOBACCO NON-USER: CPT | Performed by: NURSE PRACTITIONER

## 2024-04-15 PROCEDURE — 1090F PRES/ABSN URINE INCON ASSESS: CPT | Performed by: NURSE PRACTITIONER

## 2024-04-15 PROCEDURE — G8399 PT W/DXA RESULTS DOCUMENT: HCPCS | Performed by: NURSE PRACTITIONER

## 2024-04-15 PROCEDURE — 99214 OFFICE O/P EST MOD 30 MIN: CPT | Performed by: NURSE PRACTITIONER

## 2024-04-15 RX ORDER — AMOXICILLIN AND CLAVULANATE POTASSIUM 875; 125 MG/1; MG/1
1 TABLET, FILM COATED ORAL 2 TIMES DAILY
Qty: 14 TABLET | Refills: 0 | Status: SHIPPED | OUTPATIENT
Start: 2024-04-15 | End: 2024-04-22

## 2024-04-15 RX ORDER — BENZONATATE 200 MG/1
200 CAPSULE ORAL 3 TIMES DAILY PRN
Qty: 30 CAPSULE | Refills: 0 | Status: SHIPPED | OUTPATIENT
Start: 2024-04-15 | End: 2024-04-22

## 2024-04-15 ASSESSMENT — ENCOUNTER SYMPTOMS
SINUS PRESSURE: 1
SHORTNESS OF BREATH: 0
EYE ITCHING: 0
CHEST TIGHTNESS: 0
COUGH: 1
WHEEZING: 1
ABDOMINAL PAIN: 0
CONSTIPATION: 0
DIARRHEA: 0
NAUSEA: 0
VOMITING: 0
BACK PAIN: 0
SORE THROAT: 0
COLOR CHANGE: 0
RHINORRHEA: 0
BLOOD IN STOOL: 0
EYE REDNESS: 0
SINUS PAIN: 1

## 2024-04-15 NOTE — ASSESSMENT & PLAN NOTE
Rapid flu in office negative.  Symptoms consistent with bacterial sinusitis.Will start patient on Augmentin. Continue with symptomatic management, increased water intake, saline irrigation, as well as mucolytics and antihistamines as needed. Patient advised to call back if no improvement.   Tessalon for cough

## 2024-04-15 NOTE — PROGRESS NOTES
not bruise/bleed easily.   Psychiatric/Behavioral:  Negative for confusion, sleep disturbance and suicidal ideas. The patient is not nervous/anxious and is not hyperactive.        Vitals:    04/15/24 1140   BP: 130/82   Site: Left Upper Arm   Position: Sitting   Cuff Size: Medium Adult   Pulse: 58   Temp: 98.2 °F (36.8 °C)   SpO2: 99%   Weight: 79.8 kg (176 lb)   Height: 1.499 m (4' 11\")       Physical Exam  Constitutional:       Appearance: Normal appearance. She is well-developed.   HENT:      Head: Normocephalic and atraumatic.      Right Ear: Hearing normal.      Left Ear: Hearing normal.      Nose: No mucosal edema.      Right Sinus: No maxillary sinus tenderness or frontal sinus tenderness.      Left Sinus: No maxillary sinus tenderness or frontal sinus tenderness.      Mouth/Throat:      Tonsils: No tonsillar abscesses.   Eyes:      Extraocular Movements: Extraocular movements intact.      Pupils: Pupils are equal, round, and reactive to light.   Cardiovascular:      Rate and Rhythm: Normal rate and regular rhythm.      Pulses: Normal pulses.      Heart sounds: Normal heart sounds.   Pulmonary:      Effort: Pulmonary effort is normal.      Breath sounds: Normal breath sounds.   Lymphadenopathy:      Head:      Right side of head: No submental, submandibular, tonsillar, preauricular, posterior auricular or occipital adenopathy.      Left side of head: No submental, submandibular, tonsillar, preauricular, posterior auricular or occipital adenopathy.   Skin:     General: Skin is warm and dry.   Neurological:      Mental Status: She is alert.   Psychiatric:         Mood and Affect: Mood normal.         Behavior: Behavior normal.                 An electronic signature was used to authenticate this note.    --Mary Berman, APRN - CNP

## 2024-04-17 ENCOUNTER — TELEPHONE (OUTPATIENT)
Dept: FAMILY MEDICINE CLINIC | Age: 69
End: 2024-04-17

## 2024-04-17 NOTE — TELEPHONE ENCOUNTER
Spoke to pt and relayed information per PCP. Pt confirmed understanding and relayed that she will do her best with swallowing the medications. No additional questions or concerns.

## 2024-04-17 NOTE — TELEPHONE ENCOUNTER
The omeprazole is a capsule and that is the size it comes in without alternative. The antibiotic can be split in half.

## 2024-04-24 RX ORDER — OMEPRAZOLE 20 MG/1
20 CAPSULE, DELAYED RELEASE ORAL
Qty: 30 CAPSULE | Refills: 0 | Status: SHIPPED | OUTPATIENT
Start: 2024-04-24

## 2024-04-27 PROBLEM — Z12.11 COLON CANCER SCREENING: Status: RESOLVED | Noted: 2024-03-28 | Resolved: 2024-04-27

## 2024-05-15 ENCOUNTER — TELEPHONE (OUTPATIENT)
Dept: FAMILY MEDICINE CLINIC | Age: 69
End: 2024-05-15

## 2024-05-15 NOTE — TELEPHONE ENCOUNTER
Pt was prescribed celecoxib (CELEBREX) 200 MG capsule   Thought it would help pain in her legs  She's not getting around very well   Can something stronger be prescribed? 200 MG isn't doing it for her

## 2024-05-16 NOTE — TELEPHONE ENCOUNTER
She having pain in her legs or her back?  Looks like she has an MRI that is pending has she been able to schedule this?

## 2024-05-17 NOTE — TELEPHONE ENCOUNTER
Spoke to pt she said that she hasn't gotten the MRI done because she cannot afford another bill due to her being unemployed and her insurance only covers so much. She did state that the pain where in her legs and its been going on for awhile now.

## 2024-05-21 NOTE — TELEPHONE ENCOUNTER
Would she consider physical therapy? This can be very beneficial, and should be a covered benefit with her insurance.

## 2024-05-28 ENCOUNTER — TELEPHONE (OUTPATIENT)
Dept: FAMILY MEDICINE CLINIC | Age: 69
End: 2024-05-28

## 2024-05-29 NOTE — TELEPHONE ENCOUNTER
Pt is interested in PT  She is going to call her insurance to see if it's covered    She has an MRI scheduled for Saturday

## 2024-06-03 RX ORDER — OMEPRAZOLE 20 MG/1
20 CAPSULE, DELAYED RELEASE ORAL
Qty: 30 CAPSULE | Refills: 0 | Status: SHIPPED | OUTPATIENT
Start: 2024-06-03

## 2024-06-03 NOTE — TELEPHONE ENCOUNTER
Requested Prescriptions     Pending Prescriptions Disp Refills    omeprazole (PRILOSEC) 20 MG delayed release capsule [Pharmacy Med Name: OMEPRAZOLE DR 20 MG CAPSULE] 30 capsule 0     Sig: TAKE ONE CAPSULE BY MOUTH EVERY MORNING BEFORE BREAKFAST          Last Office Visit: 4/15/2024     Next Office Visit: Visit date not found

## 2024-06-05 ENCOUNTER — TELEPHONE (OUTPATIENT)
Dept: ORTHOPEDIC SURGERY | Age: 69
End: 2024-06-05

## 2024-06-05 RX ORDER — CELECOXIB 200 MG/1
200 CAPSULE ORAL DAILY PRN
Qty: 30 CAPSULE | Refills: 0 | Status: SHIPPED | OUTPATIENT
Start: 2024-06-05

## 2024-06-05 NOTE — TELEPHONE ENCOUNTER
Rx Request: Celebrex    Last Filled: 4/08/2024 with 1 refill  Refill Due: 06/07/2024  Last Seen: 04/08/2024  Follow Up: None

## 2024-06-05 NOTE — TELEPHONE ENCOUNTER
General Question     Subject: REQ A CALL BACK/MED/MRI RESULTS   Patient and /or Facility Request: Navya Cami M   Contact Number: [phone]       PATIENT CALLED IN TO SEE IF SHE CAN GET AN CALL BACK.. PATIENT NEED AN LETTER TO THE DISABILITY OFFICE... PATIENT WOULD LIKE TO RECEIVE AN CALL BACK REGARDING HER LUMBAR MRI RESULTS... PATIENT LOOKING FOR MEDICATION...  PATIENT IS IN PAIN..     [address] [phone]      PLEASE ADVISE

## 2024-06-21 ENCOUNTER — OFFICE VISIT (OUTPATIENT)
Dept: BARIATRICS/WEIGHT MGMT | Age: 69
End: 2024-06-21

## 2024-06-21 VITALS
HEIGHT: 60 IN | WEIGHT: 177 LBS | BODY MASS INDEX: 34.75 KG/M2 | SYSTOLIC BLOOD PRESSURE: 116 MMHG | DIASTOLIC BLOOD PRESSURE: 69 MMHG

## 2024-06-21 DIAGNOSIS — E66.9 OBESITY (BMI 30-39.9): Primary | ICD-10-CM

## 2024-06-21 RX ORDER — BUPROPION HYDROCHLORIDE 150 MG/1
150 TABLET ORAL EVERY MORNING
COMMUNITY
Start: 2024-05-04

## 2024-06-21 NOTE — PROGRESS NOTES
Cami Mendosa is a 69 y.o. female with a date of birth of 1955.    Vitals:    06/21/24 1229   BP: 116/69   Site: Left Upper Arm   Position: Sitting   Weight: 80.3 kg (177 lb)   Height: 1.511 m (4' 11.5\")    BMI: Body mass index is 35.15 kg/m². Obesity Classification: Class II    Weight History:   Wt Readings from Last 3 Encounters:   06/21/24 80.3 kg (177 lb)   04/15/24 79.8 kg (176 lb)   04/08/24 79.8 kg (176 lb)       Pt attended Medical Weight Management Seminar. Patient was educated on low-carb diet protocol. Nutrition and habit guidelines were discussed and written information was provided. Bariatric Nutrition Questionnaire completed during class and scanned into media.       Goals  Weight: 130 lbs.    Health Improvement: more energy, be healthy, get back into size 12 jeans     Assessment  Nutritional Needs:RMR=(9.99 x 80.3 kg) + (6.25 x 151 cm) - (4.92 x 69 y.o.) -161  = 1235 kcal x 1.3 (sedentary activity factor)= 1606 kcal - 500 (for 1 lb weight loss/week)= 1106 kcal.    Patient has participated in the following weight loss programs: healthy eating.   Patient has not participated in weight loss medications.  Patient has participated in meal replacement/liquid diets.  Patient does not have history of bariatric surgery.     Plan  Plan/Recommendations: Start 1200 Kcal LCMP  Optifast:  Pt interested in   Diet Medications:  Pt interested in     PES Statement: Overweight/Obesity related to lack of exercise, sedentary lifestyle, unhealthy eating habits, and unsuccessful diet attempts as evidenced by BMI. Body mass index is 35.15 kg/m².    Handouts: New Pt. Pkt, Protein bars and shakes, Frozen Meals     Will follow up as necessary.    Indu Lam, RD, LD

## 2024-07-05 RX ORDER — CELECOXIB 200 MG/1
200 CAPSULE ORAL DAILY PRN
Qty: 30 CAPSULE | Refills: 0 | Status: SHIPPED | OUTPATIENT
Start: 2024-07-05

## 2024-07-05 NOTE — TELEPHONE ENCOUNTER
Rx Request: Celebrex    Last Filled: 06/05/2024  Refill Due: 07/04/2024  Last OV: 04/08/2024  Follow Up: None    MRI completed on 06/02/2024  Appointment on 6/27/2024 was a no show

## 2024-07-12 ENCOUNTER — TELEPHONE (OUTPATIENT)
Dept: ORTHOPEDIC SURGERY | Age: 69
End: 2024-07-12

## 2024-07-15 ENCOUNTER — TELEPHONE (OUTPATIENT)
Dept: ORTHOPEDIC SURGERY | Age: 69
End: 2024-07-15

## 2024-07-15 NOTE — TELEPHONE ENCOUNTER
General Question     Subject: MEDICATION   Patient and /or Facility Request: Cami Mendosa   Contact Number: [phone]     PATIENT CALLING REQUESTING A CALL BACK FROM SOMEONE IN DR [facility] REGARDING MEDICATION FOR HER LUMBAR. PATIENT STATES THAT THE CELEBREX DOES HELP OUT AT ALL WITH HER LUMBAR PAIN      PLEASE CALL THE PATIENT BACK AT THE ABOVE NUMBER

## 2024-07-16 RX ORDER — METHYLPREDNISOLONE 4 MG/1
TABLET ORAL
Qty: 1 KIT | Refills: 0 | Status: SHIPPED | OUTPATIENT
Start: 2024-07-16 | End: 2024-07-18

## 2024-07-16 NOTE — TELEPHONE ENCOUNTER
Celebrex doesn't seem to be helping at all. Was unable to go to work today due to pain. Usually okay when she gets to work in the morning but has to do a lot of heavy lifting. She is in a lot of pain by the end of the shift.     Does have an appointment on Thursday to go over MRI.

## 2024-07-17 NOTE — TELEPHONE ENCOUNTER
Let patient know that Dr. Hernandez had sent in a steroid dosepack for her to try.     Patient was concerned she may gain weight with taking the steroid, but as it is a taper, was willing to try.    She will follow up tomorrow.

## 2024-07-18 ENCOUNTER — OFFICE VISIT (OUTPATIENT)
Dept: ORTHOPEDIC SURGERY | Age: 69
End: 2024-07-18

## 2024-07-18 VITALS — WEIGHT: 177 LBS | HEIGHT: 60 IN | BODY MASS INDEX: 34.75 KG/M2

## 2024-07-18 DIAGNOSIS — M47.816 LUMBAR SPONDYLOSIS: ICD-10-CM

## 2024-07-18 DIAGNOSIS — M50.30 DDD (DEGENERATIVE DISC DISEASE), CERVICAL: ICD-10-CM

## 2024-07-18 DIAGNOSIS — M48.061 DEGENERATIVE LUMBAR SPINAL STENOSIS: ICD-10-CM

## 2024-07-18 DIAGNOSIS — M47.816 LUMBAR FACET ARTHROPATHY: ICD-10-CM

## 2024-07-18 DIAGNOSIS — M43.10 DEGENERATIVE SPONDYLOLISTHESIS: Primary | ICD-10-CM

## 2024-07-18 RX ORDER — ETODOLAC 400 MG/1
400 TABLET, FILM COATED ORAL 2 TIMES DAILY PRN
Qty: 60 TABLET | Refills: 1 | Status: SHIPPED | OUTPATIENT
Start: 2024-07-18 | End: 2024-09-16

## 2024-07-18 NOTE — PROGRESS NOTES
discussed. The patient was thoroughly informed and all questions were answered. the patient indicated understanding and satisfaction with the discussion.      Orders:        Orders Placed This Encounter   Procedures    Breg Quick Draw Lumbar Brace     Patient was prescribed a Breg Quick Draw Lumbar Brace.   The lumbar spine will require stabilization / immobilization from this semi-rigid / rigid orthosis to improve their function.  The orthosis will assist in protecting the affected area, provide functional support and facilitate healing. This orthosis is required for the following reasons:    Support weak spinal muscles    The patient was educated and fit by a healthcare professional with expert knowledge and specialization in brace application while under the direct supervision of the physician.  Verbal and written instructions for the use of and application of this item were provided.   They were instructed to contact the office immediately should the brace result in increased pain, decreased sensation, increased swelling or worsening of the condition.           Disclaimer:    \"This note was dictated with voice recognition software. Though review and correction are routine, we apologize for any errors.\"

## 2024-08-02 NOTE — TELEPHONE ENCOUNTER
Requested Prescriptions     Pending Prescriptions Disp Refills    omeprazole (PRILOSEC) 20 MG delayed release capsule [Pharmacy Med Name: OMEPRAZOLE DR 20 MG CAPSULE] 30 capsule 0     Sig: TAKE ONE CAPSULE BY MOUTH EVERY MORNING BEFORE BREAKFAST          Last OV: 4/15/2024     Last labs: 3/29/2024     F/u: N/A

## 2024-08-05 RX ORDER — OMEPRAZOLE 20 MG/1
20 CAPSULE, DELAYED RELEASE ORAL
Qty: 30 CAPSULE | Refills: 0 | Status: SHIPPED | OUTPATIENT
Start: 2024-08-05

## 2024-10-30 DIAGNOSIS — I10 PRIMARY HYPERTENSION: ICD-10-CM

## 2024-10-30 DIAGNOSIS — E78.2 MIXED HYPERLIPIDEMIA: ICD-10-CM

## 2024-10-30 DIAGNOSIS — R12 HEART BURN: ICD-10-CM

## 2024-10-30 DIAGNOSIS — I73.9 PVD (PERIPHERAL VASCULAR DISEASE) (HCC): ICD-10-CM

## 2024-10-30 DIAGNOSIS — R53.83 FATIGUE, UNSPECIFIED TYPE: ICD-10-CM

## 2024-10-30 DIAGNOSIS — R14.0 BLOATING: ICD-10-CM

## 2024-10-30 DIAGNOSIS — R00.2 PALPITATION: ICD-10-CM

## 2024-10-30 RX ORDER — LISINOPRIL 5 MG/1
5 TABLET ORAL DAILY
Qty: 90 TABLET | Refills: 0 | Status: SHIPPED | OUTPATIENT
Start: 2024-10-30

## 2024-10-30 RX ORDER — ATORVASTATIN CALCIUM 10 MG/1
TABLET, FILM COATED ORAL
Qty: 90 TABLET | Refills: 3 | Status: SHIPPED | OUTPATIENT
Start: 2024-10-30

## 2024-10-30 NOTE — TELEPHONE ENCOUNTER
Requested Prescriptions     Pending Prescriptions Disp Refills    lisinopril (PRINIVIL;ZESTRIL) 5 MG tablet [Pharmacy Med Name: LISINOPRIL 5 MG TABLET] 90 tablet 0     Sig: TAKE 1 TABLET BY MOUTH DAILY             Checked Correct Pharmacy: Yes    Any changes since last refill? No     Number: 90    Refills: 0 - needs f/u    Last Office Visit: 11/6/2023     Next Office Visit: Visit date not found - per CG LOV pt needed to f/u with [facility]TE[facility]. Please schedule next available with another provider    Last Refill: 11/6/23    Last Labs: 3/29/24

## 2024-10-30 NOTE — TELEPHONE ENCOUNTER
Medication:   Requested Prescriptions     Pending Prescriptions Disp Refills    atorvastatin (LIPITOR) 10 MG tablet [Pharmacy Med Name: ATORVASTATIN 10 MG TABLET] 90 tablet 3     Sig: TAKE 1 TABLET BY MOUTH DAILY     Last Filled:  10/31/23    Last appt: 4/15/2024   Next appt: Visit date not found    Last Lipid:   Lab Results   Component Value Date/Time    CHOL 197 11/09/2022 04:34 PM    TRIG 171 11/09/2022 04:34 PM    HDL 57 03/29/2024 11:14 AM    HDL 61 02/24/2012 08:36 AM

## 2024-11-14 NOTE — TELEPHONE ENCOUNTER
Medication:   Requested Prescriptions     Pending Prescriptions Disp Refills    omeprazole (PRILOSEC) 20 MG delayed release capsule [Pharmacy Med Name: OMEPRAZOLE DR 20 MG CAPSULE] 30 capsule 0     Sig: TAKE ONE CAPSULE BY MOUTH EVERY MORNING BEFORE BREAKFAST        Last Filled:      Patient Phone Number: [phone] (home)     Last appt: 3/27/2024   Next appt: Visit date not found    Last OARRS:        No data to display

## 2024-11-25 ENCOUNTER — TELEPHONE (OUTPATIENT)
Dept: CARDIOLOGY CLINIC | Age: 69
End: 2024-11-25

## 2024-11-25 DIAGNOSIS — I10 PRIMARY HYPERTENSION: ICD-10-CM

## 2024-11-25 DIAGNOSIS — R12 HEART BURN: ICD-10-CM

## 2024-11-25 DIAGNOSIS — R00.2 PALPITATION: ICD-10-CM

## 2024-11-25 DIAGNOSIS — E78.2 MIXED HYPERLIPIDEMIA: ICD-10-CM

## 2024-11-25 DIAGNOSIS — R53.83 FATIGUE, UNSPECIFIED TYPE: ICD-10-CM

## 2024-11-25 DIAGNOSIS — R14.0 BLOATING: ICD-10-CM

## 2024-11-25 DIAGNOSIS — I73.9 PVD (PERIPHERAL VASCULAR DISEASE) (HCC): ICD-10-CM

## 2024-11-25 RX ORDER — METOPROLOL TARTRATE 25 MG/1
25 TABLET, FILM COATED ORAL DAILY
Qty: 30 TABLET | Refills: 0 | Status: SHIPPED | OUTPATIENT
Start: 2024-11-25

## 2024-11-25 NOTE — TELEPHONE ENCOUNTER
Rx refill request: Pt is completely out of medication and wants to  asap    Medication  metoprolol tartrate (LOPRESSOR) 25 MG tablet [[id number]]  metoprolol tartrate (LOPRESSOR) 25 MG tablet [[id number]]    Order Details  Dose: 25 mg Route: Oral Frequency: DAILY   Dispense Quantity: 90 tablet Refills: 3          Sig: Take 1 tablet by mouth daily         Pharmacy    [facility] [id number] [address] [phone] -  [phone]  [address] [place] [id number]  Phone: [phone]  Fax: [phone]

## 2024-11-26 ENCOUNTER — OFFICE VISIT (OUTPATIENT)
Dept: FAMILY MEDICINE CLINIC | Age: 69
End: 2024-11-26

## 2024-11-26 ENCOUNTER — TELEPHONE (OUTPATIENT)
Dept: FAMILY MEDICINE CLINIC | Age: 69
End: 2024-11-26

## 2024-11-26 VITALS
BODY MASS INDEX: 36.49 KG/M2 | RESPIRATION RATE: 16 BRPM | OXYGEN SATURATION: 97 % | WEIGHT: 181 LBS | TEMPERATURE: 97.2 F | HEIGHT: 59 IN | HEART RATE: 71 BPM | DIASTOLIC BLOOD PRESSURE: 80 MMHG | SYSTOLIC BLOOD PRESSURE: 136 MMHG

## 2024-11-26 DIAGNOSIS — I42.0 DILATED CARDIOMYOPATHY (HCC): ICD-10-CM

## 2024-11-26 DIAGNOSIS — Z12.31 ENCOUNTER FOR SCREENING MAMMOGRAM FOR MALIGNANT NEOPLASM OF BREAST: ICD-10-CM

## 2024-11-26 DIAGNOSIS — Z12.11 COLON CANCER SCREENING: ICD-10-CM

## 2024-11-26 DIAGNOSIS — Z00.00 MEDICARE ANNUAL WELLNESS VISIT, SUBSEQUENT: Primary | ICD-10-CM

## 2024-11-26 DIAGNOSIS — E66.01 MORBID OBESITY DUE TO EXCESS CALORIES: ICD-10-CM

## 2024-11-26 DIAGNOSIS — Z87.891 PERSONAL HISTORY OF TOBACCO USE: ICD-10-CM

## 2024-11-26 DIAGNOSIS — I10 ESSENTIAL HYPERTENSION: ICD-10-CM

## 2024-11-26 DIAGNOSIS — Z00.00 ENCOUNTER FOR ANNUAL WELLNESS VISIT (AWV) IN MEDICARE PATIENT: ICD-10-CM

## 2024-11-26 DIAGNOSIS — E55.9 VITAMIN D DEFICIENCY: ICD-10-CM

## 2024-11-26 DIAGNOSIS — I42.8 NON-ISCHEMIC CARDIOMYOPATHY (HCC): ICD-10-CM

## 2024-11-26 DIAGNOSIS — M43.00 SPONDYLOLYSIS: ICD-10-CM

## 2024-11-26 DIAGNOSIS — I87.2 VENOUS INSUFFICIENCY OF BOTH LOWER EXTREMITIES: ICD-10-CM

## 2024-11-26 PROBLEM — R06.83 SNORING: Status: RESOLVED | Noted: 2017-07-22 | Resolved: 2024-11-26

## 2024-11-26 SDOH — ECONOMIC STABILITY: INCOME INSECURITY: HOW HARD IS IT FOR YOU TO PAY FOR THE VERY BASICS LIKE FOOD, HOUSING, MEDICAL CARE, AND HEATING?: NOT HARD AT ALL

## 2024-11-26 SDOH — ECONOMIC STABILITY: FOOD INSECURITY: WITHIN THE PAST 12 MONTHS, THE FOOD YOU BOUGHT JUST DIDN'T LAST AND YOU DIDN'T HAVE MONEY TO GET MORE.: NEVER TRUE

## 2024-11-26 SDOH — ECONOMIC STABILITY: FOOD INSECURITY: WITHIN THE PAST 12 MONTHS, YOU WORRIED THAT YOUR FOOD WOULD RUN OUT BEFORE YOU GOT MONEY TO BUY MORE.: NEVER TRUE

## 2024-11-26 ASSESSMENT — PATIENT HEALTH QUESTIONNAIRE - PHQ9
SUM OF ALL RESPONSES TO PHQ QUESTIONS 1-9: 0
7. TROUBLE CONCENTRATING ON THINGS, SUCH AS READING THE NEWSPAPER OR WATCHING TELEVISION: NOT AT ALL
1. LITTLE INTEREST OR PLEASURE IN DOING THINGS: NOT AT ALL
9. THOUGHTS THAT YOU WOULD BE BETTER OFF DEAD, OR OF HURTING YOURSELF: NOT AT ALL
SUM OF ALL RESPONSES TO PHQ QUESTIONS 1-9: 0
3. TROUBLE FALLING OR STAYING ASLEEP: NOT AT ALL
5. POOR APPETITE OR OVEREATING: NOT AT ALL
SUM OF ALL RESPONSES TO PHQ QUESTIONS 1-9: 0
10. IF YOU CHECKED OFF ANY PROBLEMS, HOW DIFFICULT HAVE THESE PROBLEMS MADE IT FOR YOU TO DO YOUR WORK, TAKE CARE OF THINGS AT HOME, OR GET ALONG WITH OTHER PEOPLE: NOT DIFFICULT AT ALL
2. FEELING DOWN, DEPRESSED OR HOPELESS: NOT AT ALL
8. MOVING OR SPEAKING SO SLOWLY THAT OTHER PEOPLE COULD HAVE NOTICED. OR THE OPPOSITE, BEING SO FIGETY OR RESTLESS THAT YOU HAVE BEEN MOVING AROUND A LOT MORE THAN USUAL: NOT AT ALL
6. FEELING BAD ABOUT YOURSELF - OR THAT YOU ARE A FAILURE OR HAVE LET YOURSELF OR YOUR FAMILY DOWN: NOT AT ALL
4. FEELING TIRED OR HAVING LITTLE ENERGY: NOT AT ALL
SUM OF ALL RESPONSES TO PHQ9 QUESTIONS 1 & 2: 0
SUM OF ALL RESPONSES TO PHQ QUESTIONS 1-9: 0

## 2024-11-26 NOTE — TELEPHONE ENCOUNTER
Patient was just in the office and forgot to mention a pain in her right shoulder area and down her arm. It has a tingling sensation and pain. She wants to know if it could possible tendonitis or something more serious. The tingling is her main concern, it is not constant but its concerning. She mentioned it to the med student but forgot to mention to Mary. Please call patient concerning this matter.    [phone] (home)

## 2024-11-26 NOTE — PATIENT INSTRUCTIONS
medical history including lifestyle, illnesses that may run in your family, and various assessments and screenings as appropriate.    After reviewing your medical record and screening and assessments performed today your provider may have ordered immunizations, labs, imaging, and/or referrals for you.  A list of these orders (if applicable) as well as your Preventive Care list are included within your After Visit Summary for your review.    Other Preventive Recommendations:    A preventive eye exam performed by an eye specialist is recommended every 1-2 years to screen for glaucoma; cataracts, macular degeneration, and other eye disorders.  A preventive dental visit is recommended every 6 months.  Try to get at least 150 minutes of exercise per week or 10,000 steps per day on a pedometer .  Order or download the FREE \"Exercise & Physical Activity: Your Everyday Guide\" from [employer]. Call 1-375.891.2901 or search [employer] online.  You need 6593-2892 mg of calcium and 5903-2329 IU of vitamin D per day. It is possible to meet your calcium requirement with diet alone, but a vitamin D supplement is usually necessary to meet this goal.  When exposed to the sun, use a sunscreen that protects against both UVA and UVB radiation with an SPF of 30 or greater. Reapply every 2 to 3 hours or after sweating, drying off with a towel, or swimming.  Always wear a seat belt when traveling in a car. Always wear a helmet when riding a bicycle or motorcycle.

## 2024-11-26 NOTE — ASSESSMENT & PLAN NOTE
This is a chronic problem that is not stable controlled.  Patient would benefit from physical therapy however she is worried about affordability.  Advised patient to reach out to her insurance to see if there is any physical therapy options that are within her network.

## 2024-11-26 NOTE — PROGRESS NOTES
and symmetric, no cranial nerve deficit, gait, coordination and speech normal          No Known Allergies  Prior to Visit Medications    Medication Sig Taking? Authorizing Provider   metoprolol tartrate (LOPRESSOR) 25 MG tablet Take 1 tablet by mouth daily Yes Jocelyne Francis APRN - CNP   omeprazole (PRILOSEC) 20 MG delayed release capsule TAKE ONE CAPSULE BY MOUTH EVERY MORNING BEFORE BREAKFAST  Patient taking differently: Take 25 mg by mouth every morning (before breakfast) Yes Mary Berman APRN - CNP   lisinopril (PRINIVIL;ZESTRIL) 5 MG tablet TAKE 1 TABLET BY MOUTH DAILY Yes Jocelyne Francis APRN - CNP   atorvastatin (LIPITOR) 10 MG tablet TAKE 1 TABLET BY MOUTH DAILY Yes Mary Berman APRN - CNP   buPROPion (WELLBUTRIN XL) 150 MG extended release tablet Take 1 tablet by mouth every morning Yes Provider, Historical, MD   Handicap Placard MISC by Does not apply route Expires 3 years from issue date Yes Mary Berman APRN - CNP   Calcium Citrate-Vitamin D (CALCIUM CITRATE CHEWY BITE) 500-500 MG-UNIT CHEW Take by mouth Yes Provider, MD Tierra   etodolac (LODINE) 400 MG tablet Take 1 tablet by mouth 2 times daily as needed (pain)  Cooper Hernandez MD   tiZANidine (ZANAFLEX) 4 MG tablet Take 1 tablet by mouth 3 times daily as needed (Muscle tightness/spasm)  Patient not taking: Reported on 11/26/2024  Cooper Hernandez MD       [facility] (Including outside providers/suppliers regularly involved in providing care):   Patient Care Team:  Mary Berman APRN - CNP as PCP - General (Nurse Practitioner)  Mary Berman APRN - CNP as PCP - Empaneled Provider      Reviewed and updated this visit:  Tobacco  Allergies  Meds  Med Hx  Surg Hx  Soc Hx  Fam Hx

## 2024-11-26 NOTE — ASSESSMENT & PLAN NOTE
Encourage and exercise patient states that she is unable to exercise due to the pain in her back.  Patient has had imaging completed before that shows spondylosis.  She would benefit from physical therapy however cost is restrictive for her.  I encouraged her to try to work on weight loss to help with her back pain encouraged macronutrient diet.

## 2024-11-26 NOTE — ASSESSMENT & PLAN NOTE
Annual wellness visit completed patient is still in need of colon cancer screening and mammogram.  Both have been ordered she has not successfully completed these yet.  She is again reminded and these have been reordered for her.

## 2024-11-26 NOTE — TELEPHONE ENCOUNTER
This is likely an impingement of her neck.  Anti-inflammatories like Aleve ibuprofen Can help with this applying heat and stretching her neck should all improve this.

## 2024-12-05 ENCOUNTER — HOSPITAL ENCOUNTER (OUTPATIENT)
Dept: MAMMOGRAPHY | Age: 69
Discharge: HOME | End: 2024-12-05
Payer: MEDICARE

## 2024-12-05 VITALS — HEIGHT: 59 IN | WEIGHT: 181 LBS | BODY MASS INDEX: 36.49 KG/M2

## 2024-12-05 DIAGNOSIS — Z12.31 ENCOUNTER FOR SCREENING MAMMOGRAM FOR MALIGNANT NEOPLASM OF BREAST: ICD-10-CM

## 2024-12-05 PROCEDURE — 77063 BREAST TOMOSYNTHESIS BI: CPT

## 2024-12-10 NOTE — TELEPHONE ENCOUNTER
Medication:   Requested Prescriptions     Pending Prescriptions Disp Refills    omeprazole (PRILOSEC) 20 MG delayed release capsule [Pharmacy Med Name: OMEPRAZOLE DR 20 MG CAPSULE] 30 capsule 0     Sig: TAKE ONE CAPSULE BY MOUTH EVERY MORNING BEFORE BREAKFAST        Last Filled:      Patient Phone Number: There are no phone numbers on file.    Last appt: 11/26/2024   Next appt: Visit date not found    Last OARRS:        No data to display

## 2024-12-24 NOTE — TELEPHONE ENCOUNTER
Medication:   Requested Prescriptions     Pending Prescriptions Disp Refills    buPROPion (WELLBUTRIN XL) 150 MG extended release tablet [Pharmacy Med Name: buPROPion HCL  MG TABLET] 90 tablet      Sig: TAKE 1 TABLET BY MOUTH EVERY MORNING        Last Filled:      Patient Phone Number: There are no phone numbers on file.    Last appt: 11/26/2024   Next appt: Visit date not found    Last OARRS:        No data to display

## 2024-12-26 DIAGNOSIS — R53.83 FATIGUE, UNSPECIFIED TYPE: ICD-10-CM

## 2024-12-26 DIAGNOSIS — R14.0 BLOATING: ICD-10-CM

## 2024-12-26 DIAGNOSIS — R12 HEART BURN: ICD-10-CM

## 2024-12-26 DIAGNOSIS — R00.2 PALPITATION: ICD-10-CM

## 2024-12-26 DIAGNOSIS — I10 PRIMARY HYPERTENSION: ICD-10-CM

## 2024-12-26 DIAGNOSIS — I73.9 PVD (PERIPHERAL VASCULAR DISEASE) (HCC): ICD-10-CM

## 2024-12-26 DIAGNOSIS — E78.2 MIXED HYPERLIPIDEMIA: ICD-10-CM

## 2024-12-27 RX ORDER — METOPROLOL TARTRATE 25 MG/1
25 TABLET, FILM COATED ORAL DAILY
Qty: 30 TABLET | Refills: 0 | Status: SHIPPED | OUTPATIENT
Start: 2024-12-27

## 2024-12-27 NOTE — TELEPHONE ENCOUNTER
Requested Prescriptions     Pending Prescriptions Disp Refills    metoprolol tartrate (LOPRESSOR) 25 MG tablet [Pharmacy Med Name: METOPROLOL TARTRATE 25 MG TAB] 30 tablet 0     Sig: TAKE 1 TABLET BY MOUTH DAILY        Checked Correct Pharmacy: Yes    Any changes since last refill? No     Number: 30  Refills: 0    Last OV: 11/6/2023 Provider: CIERRA    Next OV: Visit date not found Provider:  Ian LOV was to f/u with [facility]TE[facility] after testing, needs new provider  - Called and scheduled with ALYSSA 1/3/25    Last Labs:   BMP:   Lab Results   Component Value Date/Time     03/29/2024 11:14 AM    K 4.9 03/29/2024 11:14 AM    K 3.9 05/29/2022 07:15 PM     03/29/2024 11:14 AM    CO2 28 03/29/2024 11:14 AM    BUN 24 03/29/2024 11:14 AM    CREATININE 0.9 03/29/2024 11:14 AM    GLUCOSE 90 03/29/2024 11:14 AM    CALCIUM 9.8 03/29/2024 11:14 AM    LABGLOM 68 03/29/2024 11:14 AM

## 2024-12-30 RX ORDER — BUPROPION HYDROCHLORIDE 150 MG/1
150 TABLET ORAL EVERY MORNING
Qty: 90 TABLET | Refills: 2 | Status: SHIPPED | OUTPATIENT
Start: 2024-12-30

## 2025-01-07 ENCOUNTER — TELEPHONE (OUTPATIENT)
Dept: ORTHOPEDIC SURGERY | Age: 70
End: 2025-01-07

## 2025-01-07 RX ORDER — MELOXICAM 15 MG/1
15 TABLET ORAL DAILY PRN
Qty: 30 TABLET | Refills: 0 | Status: SHIPPED | OUTPATIENT
Start: 2025-01-07

## 2025-01-07 RX ORDER — METHYLPREDNISOLONE 4 MG/1
TABLET ORAL
Qty: 1 KIT | Refills: 0 | Status: SHIPPED | OUTPATIENT
Start: 2025-01-07

## 2025-01-07 NOTE — TELEPHONE ENCOUNTER
Rx Request: Celebrex    Last Filled: 07/05/2024  Refill Due: 08/05/2024  Last OV: 07/18/2024  Follow Up: None    Patient is requesting an increase in dosage. She has not done any PT. Insurance will not cover it. Celebrex was discontinued for Lodine which she says doesn't help at all. She would like something else altogether for all of her pain. She said the Celebrex and the Lodine do not work. The Medrol gave her 75% relief for 24 hours. She is going to schedule a follow up for the end of the month.

## 2025-01-07 NOTE — TELEPHONE ENCOUNTER
Prescription Refill     Medication Name:  CELECOXIB 200 MG  Pharmacy: [facility] [id number] [address] [phone] - F [phone]   Patient Contact Number:  [phone]     PATIENT REQUESTING TO INCREASE DOSAGE AS WELL

## 2025-01-09 ENCOUNTER — TELEPHONE (OUTPATIENT)
Dept: ORTHOPEDIC SURGERY | Age: 70
End: 2025-01-09

## 2025-01-09 NOTE — TELEPHONE ENCOUNTER
General Question     Subject: MEDICATION PROBLEM REQUEST FOR STRONGER MEDICATION  Patient and /or Facility Request: Cami Mendosa   Contact Number: [phone]     PATIENT CALLED IN STATES SHE NEED A STRONGER MEDICATION STATES THE PRESCRIBED MEDICATION DOES NOT WORK AND IS LOOKING FOR SOMETHING THAT HELPS WITH HER BACK PROBLEMS. SAYS HER DAUGHTER RECOMMENDED MELOXICAM  BECAUSE IT WORKED FOR HER.    PLEASE CALL PATIENT AT ABOVE NUMBER.

## 2025-01-20 ENCOUNTER — TELEPHONE (OUTPATIENT)
Dept: ORTHOPEDIC SURGERY | Age: 70
End: 2025-01-20

## 2025-01-20 NOTE — TELEPHONE ENCOUNTER
Prescription Refill     Medication Name:  PAIN MEDICATION  [facility] [address] [place]   Patient Contact Number:  [phone]     PATIENT CALLED AND STATED STEROID MEDICATION WAS CALLED IN TO HER PHARMACY BUT SHE IS NOT WANTING STEROID MEDICATION SHE IS WANTING A MEDICATION THAT IS EQUIVALENT TO THE STEROID MEDICATION    PLEASE CALL BACK THE ABOVE NUMBER

## 2025-01-22 ENCOUNTER — TELEPHONE (OUTPATIENT)
Dept: ORTHOPEDIC SURGERY | Age: 70
End: 2025-01-22

## 2025-01-22 NOTE — TELEPHONE ENCOUNTER
General Question     Subject: MEDICATION QUESTION  Patient and /or Facility Request: Cami Mendosa   Contact Number: [phone]     PATIENT CALLED IN TO SEE IF SHE CAN SPEAK TO AGUSTIN IN THE OFFICE ABOUT AN STEROID GENEVA. DO SHE NEED TO TAKE THE MELOXICAM FIRST..     REQ A CALL BACK..    PLEASE ADVISE

## 2025-01-30 ENCOUNTER — OFFICE VISIT (OUTPATIENT)
Dept: CARDIOLOGY CLINIC | Age: 70
End: 2025-01-30
Payer: MEDICARE

## 2025-01-30 VITALS
HEART RATE: 66 BPM | SYSTOLIC BLOOD PRESSURE: 144 MMHG | BODY MASS INDEX: 37.08 KG/M2 | OXYGEN SATURATION: 98 % | DIASTOLIC BLOOD PRESSURE: 92 MMHG | WEIGHT: 183.6 LBS

## 2025-01-30 DIAGNOSIS — R35.0 INCREASED URINARY FREQUENCY: ICD-10-CM

## 2025-01-30 DIAGNOSIS — I73.9 CLAUDICATION (HCC): ICD-10-CM

## 2025-01-30 DIAGNOSIS — I73.9 PVD (PERIPHERAL VASCULAR DISEASE) (HCC): ICD-10-CM

## 2025-01-30 DIAGNOSIS — R00.2 PALPITATION: ICD-10-CM

## 2025-01-30 DIAGNOSIS — E78.2 MIXED HYPERLIPIDEMIA: ICD-10-CM

## 2025-01-30 DIAGNOSIS — I10 ESSENTIAL HYPERTENSION: Primary | ICD-10-CM

## 2025-01-30 PROCEDURE — G8427 DOCREV CUR MEDS BY ELIG CLIN: HCPCS | Performed by: NURSE PRACTITIONER

## 2025-01-30 PROCEDURE — 93000 ELECTROCARDIOGRAM COMPLETE: CPT | Performed by: NURSE PRACTITIONER

## 2025-01-30 PROCEDURE — 1036F TOBACCO NON-USER: CPT | Performed by: NURSE PRACTITIONER

## 2025-01-30 PROCEDURE — 1123F ACP DISCUSS/DSCN MKR DOCD: CPT | Performed by: NURSE PRACTITIONER

## 2025-01-30 PROCEDURE — 3077F SYST BP >= 140 MM HG: CPT | Performed by: NURSE PRACTITIONER

## 2025-01-30 PROCEDURE — G8417 CALC BMI ABV UP PARAM F/U: HCPCS | Performed by: NURSE PRACTITIONER

## 2025-01-30 PROCEDURE — G8399 PT W/DXA RESULTS DOCUMENT: HCPCS | Performed by: NURSE PRACTITIONER

## 2025-01-30 PROCEDURE — 99215 OFFICE O/P EST HI 40 MIN: CPT | Performed by: NURSE PRACTITIONER

## 2025-01-30 PROCEDURE — 3017F COLORECTAL CA SCREEN DOC REV: CPT | Performed by: NURSE PRACTITIONER

## 2025-01-30 PROCEDURE — 3080F DIAST BP >= 90 MM HG: CPT | Performed by: NURSE PRACTITIONER

## 2025-01-30 PROCEDURE — 1090F PRES/ABSN URINE INCON ASSESS: CPT | Performed by: NURSE PRACTITIONER

## 2025-01-30 RX ORDER — LISINOPRIL 10 MG/1
10 TABLET ORAL DAILY
Qty: 90 TABLET | Refills: 3 | Status: SHIPPED | OUTPATIENT
Start: 2025-01-30

## 2025-01-30 RX ORDER — ATORVASTATIN CALCIUM 40 MG/1
40 TABLET, FILM COATED ORAL EVERY EVENING
Qty: 90 TABLET | Refills: 3 | Status: SHIPPED | OUTPATIENT
Start: 2025-01-30

## 2025-01-30 RX ORDER — METOPROLOL TARTRATE 25 MG/1
25 TABLET, FILM COATED ORAL DAILY
Qty: 90 TABLET | Refills: 3 | Status: SHIPPED | OUTPATIENT
Start: 2025-01-30

## 2025-01-30 NOTE — PROGRESS NOTES
CC palpitations, HNT, HLD    HPI:  69 y.o. patient of Dr Walton with palpitations, HTN, HLD, hx takusubo CMP (2014-resolved), and PVD who is here for 1 year follow up and medication refill.     She c/o urinary frequency/urgency then only urinating small amounts at a time. No blood or foul odor noted. No fever.     She c/o bloating and decreased appetite. She has colonoscopy in February.     She has claudication to both legs but denies non-healing wounds. She has a lot of back pain and notes occasional LE edema.     Palpitations are well controlled on Toprol.     She denies cp, sob, orthopnea, LH/dizziness, syncope or falls. No n/v/d, fever or GI/ bleeding.      Past Medical History:   Diagnosis Date    Cardiomyopathy (HCC) 2014    Dx @ [facility].    Chronic back pain     Colon polyp     DDD (degenerative disc disease), lumbar 3/30/2015    Degenerative arthritis of cervical spine     Depression 2014    Essential hypertension, benign     Hyperlipidemia     Osteopenia     Varicose vein      Past Surgical History:   Procedure Laterality Date    COLONOSCOPY      x3     Family History   Problem Relation Age of Onset    Cancer Father         oral cancer    Breast Cancer Sister         under 50    Breast Cancer Sister         over 50    Colon Cancer Brother     Coronary Art Dis Other     Hypertension Other     Other Other         PVOD     Social History     Tobacco Use    Smoking status: Former     Current packs/day: 0.00     Average packs/day: 0.5 packs/day for 41.0 years (20.5 ttl pk-yrs)     Types: Cigarettes     Start date: 2013     Quit date: 2013     Years since quittin.4    Smokeless tobacco: Never   Vaping Use    Vaping status: Never Used   Substance Use Topics    Alcohol use: No    Drug use: No     Allergies:Patient has no known allergies.    Review of Systems  All 12 point review of symptoms completed. Pertinent positives identified in the HPI, all other review of symptoms

## 2025-01-31 DIAGNOSIS — E55.9 VITAMIN D DEFICIENCY: ICD-10-CM

## 2025-01-31 DIAGNOSIS — Z00.00 ENCOUNTER FOR ANNUAL WELLNESS VISIT (AWV) IN MEDICARE PATIENT: ICD-10-CM

## 2025-01-31 DIAGNOSIS — R35.0 INCREASED URINARY FREQUENCY: ICD-10-CM

## 2025-01-31 LAB
25(OH)D3 SERPL-MCNC: 27.5 NG/ML
ALBUMIN SERPL-MCNC: 3.9 G/DL (ref 3.4–5)
ALBUMIN/GLOB SERPL: 2 {RATIO} (ref 1.1–2.2)
ALP SERPL-CCNC: 110 U/L (ref 40–129)
ALT SERPL-CCNC: 34 U/L (ref 10–40)
ANION GAP SERPL CALCULATED.3IONS-SCNC: 7 MMOL/L (ref 3–16)
AST SERPL-CCNC: 27 U/L (ref 15–37)
BACTERIA URNS QL MICRO: ABNORMAL /HPF
BILIRUB SERPL-MCNC: 0.4 MG/DL (ref 0–1)
BILIRUB UR QL STRIP.AUTO: NEGATIVE
BUN SERPL-MCNC: 27 MG/DL (ref 7–20)
CALCIUM SERPL-MCNC: 9.6 MG/DL (ref 8.3–10.6)
CHARACTER UR: ABNORMAL
CHLORIDE SERPL-SCNC: 109 MMOL/L (ref 99–110)
CHOLEST SERPL-MCNC: 204 MG/DL (ref 0–199)
CLARITY UR: ABNORMAL
CO2 SERPL-SCNC: 28 MMOL/L (ref 21–32)
COLOR UR: YELLOW
CREAT SERPL-MCNC: 0.9 MG/DL (ref 0.6–1.2)
CRYSTALS URNS MICRO: ABNORMAL /HPF
EPI CELLS #/AREA URNS AUTO: 27 /HPF (ref 0–5)
GFR SERPLBLD CREATININE-BSD FMLA CKD-EPI: 68 ML/MIN/{1.73_M2}
GLUCOSE SERPL-MCNC: 95 MG/DL (ref 70–99)
GLUCOSE UR STRIP.AUTO-MCNC: NEGATIVE MG/DL
HDLC SERPL-MCNC: 51 MG/DL (ref 40–60)
HGB UR QL STRIP.AUTO: NEGATIVE
HYALINE CASTS #/AREA URNS AUTO: 2 /LPF (ref 0–8)
KETONES UR STRIP.AUTO-MCNC: NEGATIVE MG/DL
LDL CHOLESTEROL: 121 MG/DL
LEUKOCYTE ESTERASE UR QL STRIP.AUTO: ABNORMAL
NITRITE UR QL STRIP.AUTO: NEGATIVE
PH UR STRIP.AUTO: 5.5 [PH] (ref 5–8)
POTASSIUM SERPL-SCNC: 5.2 MMOL/L (ref 3.5–5.1)
PROT SERPL-MCNC: 5.9 G/DL (ref 6.4–8.2)
PROT UR STRIP.AUTO-MCNC: ABNORMAL MG/DL
RBC CLUMPS #/AREA URNS AUTO: 1 /HPF (ref 0–4)
RENAL EPI CELLS #/AREA UR COMP ASSIST: ABNORMAL /HPF (ref 0–1)
SODIUM SERPL-SCNC: 144 MMOL/L (ref 136–145)
SP GR UR STRIP.AUTO: 1.03 (ref 1–1.03)
TRIGL SERPL-MCNC: 159 MG/DL (ref 0–150)
TSH SERPL DL<=0.005 MIU/L-ACNC: 2.49 UIU/ML (ref 0.27–4.2)
UA COMPLETE W REFLEX CULTURE PNL UR: YES
UA DIPSTICK W REFLEX MICRO PNL UR: YES
URN SPEC COLLECT METH UR: ABNORMAL
UROBILINOGEN UR STRIP-ACNC: 0.2 E.U./DL
VLDLC SERPL CALC-MCNC: 32 MG/DL
WBC #/AREA URNS AUTO: 36 /HPF (ref 0–5)

## 2025-02-01 LAB
DEPRECATED RDW RBC AUTO: 13.8 % (ref 12.4–15.4)
EST. AVERAGE GLUCOSE BLD GHB EST-MCNC: 102.5 MG/DL
HBA1C MFR BLD: 5.2 %
HCT VFR BLD AUTO: 39.9 % (ref 36–48)
HGB BLD-MCNC: 13.3 G/DL (ref 12–16)
MCH RBC QN AUTO: 30.1 PG (ref 26–34)
MCHC RBC AUTO-ENTMCNC: 33.2 G/DL (ref 31–36)
MCV RBC AUTO: 90.7 FL (ref 80–100)
PLATELET # BLD AUTO: 183 K/UL (ref 135–450)
PMV BLD AUTO: 8.7 FL (ref 5–10.5)
RBC # BLD AUTO: 4.4 M/UL (ref 4–5.2)
WBC # BLD AUTO: 5.4 K/UL (ref 4–11)

## 2025-02-02 LAB — BACTERIA UR CULT: NORMAL

## 2025-02-25 RX ORDER — MELOXICAM 15 MG/1
TABLET ORAL
Qty: 30 TABLET | Refills: 0 | OUTPATIENT
Start: 2025-02-25

## 2025-02-25 RX ORDER — BUPROPION HYDROCHLORIDE 150 MG/1
150 TABLET ORAL EVERY MORNING
Qty: 90 TABLET | Refills: 2 | Status: SHIPPED | OUTPATIENT
Start: 2025-02-25

## 2025-03-16 PROBLEM — E78.49 OTHER HYPERLIPIDEMIA: Status: ACTIVE | Noted: 2025-03-16

## 2025-03-16 PROBLEM — R73.09 OTHER ABNORMAL GLUCOSE: Status: ACTIVE | Noted: 2025-03-16

## 2025-03-16 PROBLEM — R53.83 OTHER FATIGUE: Status: ACTIVE | Noted: 2025-03-16

## 2025-03-24 RX ORDER — OMEPRAZOLE 20 MG/1
20 CAPSULE, DELAYED RELEASE ORAL
Qty: 90 CAPSULE | Refills: 0 | Status: SHIPPED | OUTPATIENT
Start: 2025-03-24

## 2025-03-24 NOTE — TELEPHONE ENCOUNTER
Medication:   Requested Prescriptions     Pending Prescriptions Disp Refills    omeprazole (PRILOSEC) 20 MG delayed release capsule [Pharmacy Med Name: OMEPRAZOLE DR 20 MG CAPSULE] 90 capsule 0     Sig: TAKE ONE CAPSULE BY MOUTH EVERY MORNING BEFORE BREAKFAST        Last Filled:      Patient Phone Number: There are no phone numbers on file.    Last appt: 11/26/2024   Next appt: Visit date not found    Last OARRS:        No data to display

## 2025-06-02 ENCOUNTER — OFFICE VISIT (OUTPATIENT)
Dept: FAMILY MEDICINE CLINIC | Age: 70
End: 2025-06-02
Payer: MEDICARE

## 2025-06-02 VITALS
HEART RATE: 57 BPM | OXYGEN SATURATION: 98 % | RESPIRATION RATE: 16 BRPM | BODY MASS INDEX: 37.08 KG/M2 | TEMPERATURE: 96.9 F | SYSTOLIC BLOOD PRESSURE: 122 MMHG | WEIGHT: 183.6 LBS | DIASTOLIC BLOOD PRESSURE: 82 MMHG

## 2025-06-02 DIAGNOSIS — E66.01 MORBID OBESITY DUE TO EXCESS CALORIES (HCC): ICD-10-CM

## 2025-06-02 DIAGNOSIS — Z87.891 PERSONAL HISTORY OF TOBACCO USE: Primary | ICD-10-CM

## 2025-06-02 DIAGNOSIS — F33.2 SEVERE EPISODE OF RECURRENT MAJOR DEPRESSIVE DISORDER, WITHOUT PSYCHOTIC FEATURES (HCC): ICD-10-CM

## 2025-06-02 DIAGNOSIS — I42.0 DILATED CARDIOMYOPATHY (HCC): ICD-10-CM

## 2025-06-02 DIAGNOSIS — I10 ESSENTIAL HYPERTENSION: ICD-10-CM

## 2025-06-02 DIAGNOSIS — I42.8 NON-ISCHEMIC CARDIOMYOPATHY (HCC): ICD-10-CM

## 2025-06-02 DIAGNOSIS — K21.9 GASTROESOPHAGEAL REFLUX DISEASE WITHOUT ESOPHAGITIS: ICD-10-CM

## 2025-06-02 PROCEDURE — 99214 OFFICE O/P EST MOD 30 MIN: CPT | Performed by: NURSE PRACTITIONER

## 2025-06-02 PROCEDURE — 3017F COLORECTAL CA SCREEN DOC REV: CPT | Performed by: NURSE PRACTITIONER

## 2025-06-02 PROCEDURE — G2211 COMPLEX E/M VISIT ADD ON: HCPCS | Performed by: NURSE PRACTITIONER

## 2025-06-02 PROCEDURE — 1036F TOBACCO NON-USER: CPT | Performed by: NURSE PRACTITIONER

## 2025-06-02 PROCEDURE — 3077F SYST BP >= 140 MM HG: CPT | Performed by: NURSE PRACTITIONER

## 2025-06-02 PROCEDURE — G8399 PT W/DXA RESULTS DOCUMENT: HCPCS | Performed by: NURSE PRACTITIONER

## 2025-06-02 PROCEDURE — G8417 CALC BMI ABV UP PARAM F/U: HCPCS | Performed by: NURSE PRACTITIONER

## 2025-06-02 PROCEDURE — 3080F DIAST BP >= 90 MM HG: CPT | Performed by: NURSE PRACTITIONER

## 2025-06-02 PROCEDURE — G0296 VISIT TO DETERM LDCT ELIG: HCPCS | Performed by: NURSE PRACTITIONER

## 2025-06-02 PROCEDURE — G8427 DOCREV CUR MEDS BY ELIG CLIN: HCPCS | Performed by: NURSE PRACTITIONER

## 2025-06-02 PROCEDURE — 1090F PRES/ABSN URINE INCON ASSESS: CPT | Performed by: NURSE PRACTITIONER

## 2025-06-02 PROCEDURE — 1123F ACP DISCUSS/DSCN MKR DOCD: CPT | Performed by: NURSE PRACTITIONER

## 2025-06-02 SDOH — ECONOMIC STABILITY: FOOD INSECURITY: WITHIN THE PAST 12 MONTHS, THE FOOD YOU BOUGHT JUST DIDN'T LAST AND YOU DIDN'T HAVE MONEY TO GET MORE.: NEVER TRUE

## 2025-06-02 SDOH — ECONOMIC STABILITY: FOOD INSECURITY: WITHIN THE PAST 12 MONTHS, YOU WORRIED THAT YOUR FOOD WOULD RUN OUT BEFORE YOU GOT MONEY TO BUY MORE.: NEVER TRUE

## 2025-06-02 ASSESSMENT — ENCOUNTER SYMPTOMS
ABDOMINAL PAIN: 0
DIARRHEA: 0
EYE REDNESS: 0
NAUSEA: 0
SHORTNESS OF BREATH: 0
WHEEZING: 0
VOMITING: 0
BLOOD IN STOOL: 0
SORE THROAT: 0
BACK PAIN: 0
SINUS PRESSURE: 0
CONSTIPATION: 0
RHINORRHEA: 0
COLOR CHANGE: 0
CHEST TIGHTNESS: 0
EYE ITCHING: 0
COUGH: 0

## 2025-06-02 ASSESSMENT — PATIENT HEALTH QUESTIONNAIRE - PHQ9
3. TROUBLE FALLING OR STAYING ASLEEP: NEARLY EVERY DAY
6. FEELING BAD ABOUT YOURSELF - OR THAT YOU ARE A FAILURE OR HAVE LET YOURSELF OR YOUR FAMILY DOWN: NEARLY EVERY DAY
SUM OF ALL RESPONSES TO PHQ QUESTIONS 1-9: 18
5. POOR APPETITE OR OVEREATING: NEARLY EVERY DAY
7. TROUBLE CONCENTRATING ON THINGS, SUCH AS READING THE NEWSPAPER OR WATCHING TELEVISION: NOT AT ALL
2. FEELING DOWN, DEPRESSED OR HOPELESS: NEARLY EVERY DAY
SUM OF ALL RESPONSES TO PHQ QUESTIONS 1-9: 18
1. LITTLE INTEREST OR PLEASURE IN DOING THINGS: NEARLY EVERY DAY
8. MOVING OR SPEAKING SO SLOWLY THAT OTHER PEOPLE COULD HAVE NOTICED. OR THE OPPOSITE, BEING SO FIGETY OR RESTLESS THAT YOU HAVE BEEN MOVING AROUND A LOT MORE THAN USUAL: NOT AT ALL
9. THOUGHTS THAT YOU WOULD BE BETTER OFF DEAD, OR OF HURTING YOURSELF: NOT AT ALL
SUM OF ALL RESPONSES TO PHQ QUESTIONS 1-9: 18
SUM OF ALL RESPONSES TO PHQ QUESTIONS 1-9: 18
4. FEELING TIRED OR HAVING LITTLE ENERGY: NEARLY EVERY DAY

## 2025-06-02 NOTE — PROGRESS NOTES
Cami Mendosa (:  1955) is a 70 y.o. female,Established patient, here for evaluation of the following chief complaint(s):  Letter for School/Work (Letter for [employer])      ASSESSMENT/PLAN:  1. Personal history of tobacco use  -     PA VISIT TO DISCUSS LUNG CA SCREEN W LDCT  -     CT Lung Screen (Initial/Annual/Baseline); Future  2. Non-ischemic cardiomyopathy (HCC)  3. Morbid obesity due to excess calories (HCC)  4. Dilated cardiomyopathy (HCC)  5. Essential hypertension  6. Gastroesophageal reflux disease without esophagitis  7. Severe episode of recurrent major depressive disorder, without psychotic features (HCC)      Assessment & Plan  1. Depression.  - She is currently taking bupropion for her depression.  - The emotional support form was completed to assist with her housing situation, emphasizing the importance of her pets for emotional support.  - Discussed how not having her pets would increase her depression and limit her function.  - Advised to continue her current medication regimen.    2. Hypertension.  - Her blood pressure was initially 152/94 mmHg but normalized to 122/82 mmHg upon recheck.  - She is currently on lisinopril 10 mg and metoprolol 25 mg.  - Blood pressure cuff size may have affected initial reading.  - Advised to continue her current medication regimen.    3. Hyperlipidemia.  - Her cholesterol levels were slightly elevated in recent blood work.  - She is currently on atorvastatin 40 mg.  - Kidney function is within normal limits.  - Advised to continue her current medication regimen.    4. Indigestion.  - She is currently taking Prilosec for indigestion.  - Recent blood work showed slightly low vitamin D levels.  - Advised to continue her current medication regimen and supplements.  - No new issues noted in physical examination.    5. Weight management.  - She expressed interest in weight loss and mentioned Adipex.  - Adipex was not prescribed due to its high-risk profile and

## 2025-07-08 RX ORDER — OMEPRAZOLE 20 MG/1
20 CAPSULE, DELAYED RELEASE ORAL
Qty: 90 CAPSULE | Refills: 0 | Status: SHIPPED | OUTPATIENT
Start: 2025-07-08

## 2025-07-08 NOTE — TELEPHONE ENCOUNTER
Medication:   Requested Prescriptions     Pending Prescriptions Disp Refills    omeprazole (PRILOSEC) 20 MG delayed release capsule [Pharmacy Med Name: OMEPRAZOLE DR 20 MG CAPSULE] 90 capsule 0     Sig: TAKE 1 CAPSULE BY MOUTH EVERY MORNING BEFORE BREAKFAST        Last Filled:      Patient Phone Number: [phone] (home)     Last appt: 6/2/2025   Next appt: 9/2/2025    Last OARRS:        No data to display

## 2025-07-11 ENCOUNTER — OFFICE VISIT (OUTPATIENT)
Dept: FAMILY MEDICINE CLINIC | Age: 70
End: 2025-07-11

## 2025-07-11 VITALS
WEIGHT: 179 LBS | BODY MASS INDEX: 36.08 KG/M2 | HEIGHT: 59 IN | OXYGEN SATURATION: 98 % | DIASTOLIC BLOOD PRESSURE: 72 MMHG | TEMPERATURE: 98.5 F | SYSTOLIC BLOOD PRESSURE: 124 MMHG | HEART RATE: 62 BPM

## 2025-07-11 DIAGNOSIS — L30.9 DERMATITIS: ICD-10-CM

## 2025-07-11 DIAGNOSIS — E66.01 MORBID OBESITY DUE TO EXCESS CALORIES (HCC): ICD-10-CM

## 2025-07-11 DIAGNOSIS — F33.2 SEVERE EPISODE OF RECURRENT MAJOR DEPRESSIVE DISORDER, WITHOUT PSYCHOTIC FEATURES (HCC): Primary | ICD-10-CM

## 2025-07-11 DIAGNOSIS — F41.1 GAD (GENERALIZED ANXIETY DISORDER): ICD-10-CM

## 2025-07-11 RX ORDER — HYDROXYZINE HYDROCHLORIDE 25 MG/1
25 TABLET, FILM COATED ORAL EVERY 8 HOURS PRN
Qty: 30 TABLET | Refills: 0 | Status: SHIPPED | OUTPATIENT
Start: 2025-07-11 | End: 2025-07-21

## 2025-07-11 RX ORDER — TRIAMCINOLONE ACETONIDE 1 MG/G
OINTMENT TOPICAL 2 TIMES DAILY
Qty: 30 G | Refills: 0 | Status: SHIPPED | OUTPATIENT
Start: 2025-07-11 | End: 2025-07-18

## 2025-07-11 RX ORDER — BUPROPION HYDROCHLORIDE 300 MG/1
300 TABLET ORAL EVERY MORNING
Qty: 90 TABLET | Refills: 0 | Status: SHIPPED | OUTPATIENT
Start: 2025-07-11

## 2025-07-11 RX ORDER — MELOXICAM 15 MG/1
15 TABLET ORAL DAILY PRN
Qty: 30 TABLET | Refills: 0 | Status: SHIPPED | OUTPATIENT
Start: 2025-07-11

## 2025-07-11 ASSESSMENT — ENCOUNTER SYMPTOMS
SHORTNESS OF BREATH: 0
COUGH: 0
BACK PAIN: 0
SINUS PRESSURE: 0
WHEEZING: 0
ABDOMINAL PAIN: 0
DIARRHEA: 0
COLOR CHANGE: 0
SINUS PAIN: 0
CONSTIPATION: 0

## 2025-07-11 NOTE — PROGRESS NOTES
Cami Mendosa (:  1955) is a 70 y.o. female,Established patient, here for evaluation of the following chief complaint(s):  Skin Problem and Anxiety (Short temper, aggravation)      ASSESSMENT/PLAN:  Assessment & Plan  Severe episode of recurrent major depressive disorder, without psychotic features (HCC)     SHAREE (generalized anxiety disorder)     Dermatitis     Morbid obesity due to excess calories (HCC)        Assessment & Plan  1. Suspected chigger bites.  - Symptoms include itching on the hand, belly, and feet without visible rash, suggesting possible insect bites rather than poison ivy.  - Physical exam findings indicate small, scattered bumps resembling bug bites.  - Discussed the differential diagnosis, including the possibility of insect bites over poison ivy.  - Prescribed a topical steroid ointment to alleviate itching and recommended over-the-counter antihistamines such as Benadryl, Zyrtec, or Claritin for additional relief.    2. Anxiety.  - Reports feeling very short-tempered, aggravated quickly, and anxious, especially in stressful situations.  - Currently on Wellbutrin for depression; situational anxiety likely exacerbated by life stressors.  - Discussed increasing the dosage of Wellbutrin to help manage anxiety and depression.  - Prescribed hydroxyzine as an as-needed medication for severe anxiety episodes.    3. Depression.  - Reports feeling sad and not looking forward to family gatherings.  - Currently on Wellbutrin for depression.  - Discussed increasing the dosage of Wellbutrin to help manage depression.  - Plan to follow up on the effectiveness of the increased dosage during the next appointment.    4. Arthritis pain.  - Reports having a bad back and difficulty walking, with pain on the left side.  - Previously prescribed meloxicam in January.  - Prescribed meloxicam to manage arthritis pain and improve mobility.        No follow-ups on file.    SUBJECTIVE/OBJECTIVE:    History of Present

## 2025-07-28 ENCOUNTER — OFFICE VISIT (OUTPATIENT)
Dept: ORTHOPEDIC SURGERY | Age: 70
End: 2025-07-28
Payer: MEDICARE

## 2025-07-28 VITALS — WEIGHT: 179.01 LBS | HEIGHT: 59 IN | BODY MASS INDEX: 36.09 KG/M2

## 2025-07-28 DIAGNOSIS — M50.30 DDD (DEGENERATIVE DISC DISEASE), CERVICAL: ICD-10-CM

## 2025-07-28 DIAGNOSIS — M43.10 DEGENERATIVE SPONDYLOLISTHESIS: Primary | ICD-10-CM

## 2025-07-28 DIAGNOSIS — S33.9XXA SPRAIN AND STRAIN OF LUMBOSACRAL JOINT/LIGAMENT, INITIAL ENCOUNTER: ICD-10-CM

## 2025-07-28 DIAGNOSIS — M47.816 LUMBAR SPONDYLOSIS: ICD-10-CM

## 2025-07-28 DIAGNOSIS — M48.061 DEGENERATIVE LUMBAR SPINAL STENOSIS: ICD-10-CM

## 2025-07-28 PROCEDURE — G8399 PT W/DXA RESULTS DOCUMENT: HCPCS | Performed by: INTERNAL MEDICINE

## 2025-07-28 PROCEDURE — 99214 OFFICE O/P EST MOD 30 MIN: CPT | Performed by: INTERNAL MEDICINE

## 2025-07-28 PROCEDURE — 1090F PRES/ABSN URINE INCON ASSESS: CPT | Performed by: INTERNAL MEDICINE

## 2025-07-28 PROCEDURE — 3017F COLORECTAL CA SCREEN DOC REV: CPT | Performed by: INTERNAL MEDICINE

## 2025-07-28 PROCEDURE — 1036F TOBACCO NON-USER: CPT | Performed by: INTERNAL MEDICINE

## 2025-07-28 PROCEDURE — 1123F ACP DISCUSS/DSCN MKR DOCD: CPT | Performed by: INTERNAL MEDICINE

## 2025-07-28 PROCEDURE — G8427 DOCREV CUR MEDS BY ELIG CLIN: HCPCS | Performed by: INTERNAL MEDICINE

## 2025-07-28 PROCEDURE — G8417 CALC BMI ABV UP PARAM F/U: HCPCS | Performed by: INTERNAL MEDICINE

## 2025-07-28 RX ORDER — METHYLPREDNISOLONE 4 MG/1
TABLET ORAL
Qty: 1 KIT | Refills: 0 | Status: SHIPPED | OUTPATIENT
Start: 2025-07-28

## 2025-07-28 NOTE — PROGRESS NOTES
Chief Complaint:   Chief Complaint   Patient presents with    Lower Back Pain     mostly L sided, no new injuries/falls, feels her condition has worsened, unable to work jobs with lots of walking and looking for a desk job, pain in bilat hips, knees, and feet - unsure if related to LBP          History of Present Illness:       Patient is a 70 y.o. female presents with the above complaint. The symptoms began worsening over the past few monthsago and started without an injury.  She was previously evaluated for her low back on 7/18/2024 and lost to follow-up.    The patient describes a aching pain that does not radiate.  The symptoms are intermittent.    She was recently evaluated by Tanesha Kay CNP and started on meloxicam as referenced in the medical record 7/11/2025.       The symptoms of back pain  do not show a typical neurogenic claudication patternalthough generally worsened by standing and improved with sitting. There is not new onset weakness or progressive weakness of the lower extremities that has developed. The patient denies new onset bowel or bladder dysfunction.  There is no history of previous spinal trauma.    The patient does not have history or orthopaedic lumbar spine surgery.    Pain localizes to the left lateral side and lumbosacral region    Pain levels: 0 today     There is no neuritic lower limb pain. The back pain : limb pain is 100 : 0      Work-up to date has included:MRI  Prior treatment has included NSAID-meloxicam,  medically directed home exercises and bracing trial. This patient reports some improvement with  recent trial of reatment.    The patient has no history or autoimmune disease, inflammatory arthropathy or crystal arthropathy.      Past Medical History:        Past Medical History:   Diagnosis Date    Cardiomyopathy (HCC) 1/7/2014    Dx @ [facility].    Chronic back pain     Colon polyp     DDD (degenerative disc disease), lumbar 3/30/2015    Degenerative arthritis of

## 2025-08-29 ENCOUNTER — OFFICE VISIT (OUTPATIENT)
Dept: CARDIOLOGY CLINIC | Age: 70
End: 2025-08-29
Payer: MEDICARE

## 2025-08-29 VITALS
BODY MASS INDEX: 36.54 KG/M2 | SYSTOLIC BLOOD PRESSURE: 120 MMHG | HEART RATE: 74 BPM | DIASTOLIC BLOOD PRESSURE: 80 MMHG | WEIGHT: 181 LBS

## 2025-08-29 DIAGNOSIS — R06.02 SHORTNESS OF BREATH: ICD-10-CM

## 2025-08-29 DIAGNOSIS — I42.9 CARDIOMYOPATHY, UNSPECIFIED TYPE (HCC): Primary | ICD-10-CM

## 2025-08-29 DIAGNOSIS — R01.1 HEART MURMUR: ICD-10-CM

## 2025-08-29 DIAGNOSIS — I73.9 PVD (PERIPHERAL VASCULAR DISEASE): ICD-10-CM

## 2025-08-29 DIAGNOSIS — R00.2 PALPITATION: ICD-10-CM

## 2025-08-29 DIAGNOSIS — E78.2 MIXED HYPERLIPIDEMIA: ICD-10-CM

## 2025-08-29 PROCEDURE — 1036F TOBACCO NON-USER: CPT | Performed by: NURSE PRACTITIONER

## 2025-08-29 PROCEDURE — 3079F DIAST BP 80-89 MM HG: CPT | Performed by: NURSE PRACTITIONER

## 2025-08-29 PROCEDURE — 3017F COLORECTAL CA SCREEN DOC REV: CPT | Performed by: NURSE PRACTITIONER

## 2025-08-29 PROCEDURE — 1123F ACP DISCUSS/DSCN MKR DOCD: CPT | Performed by: NURSE PRACTITIONER

## 2025-08-29 PROCEDURE — G8399 PT W/DXA RESULTS DOCUMENT: HCPCS | Performed by: NURSE PRACTITIONER

## 2025-08-29 PROCEDURE — 3074F SYST BP LT 130 MM HG: CPT | Performed by: NURSE PRACTITIONER

## 2025-08-29 PROCEDURE — G8417 CALC BMI ABV UP PARAM F/U: HCPCS | Performed by: NURSE PRACTITIONER

## 2025-08-29 PROCEDURE — 1090F PRES/ABSN URINE INCON ASSESS: CPT | Performed by: NURSE PRACTITIONER

## 2025-08-29 PROCEDURE — 99215 OFFICE O/P EST HI 40 MIN: CPT | Performed by: NURSE PRACTITIONER

## 2025-08-29 PROCEDURE — G8427 DOCREV CUR MEDS BY ELIG CLIN: HCPCS | Performed by: NURSE PRACTITIONER

## 2025-08-29 RX ORDER — METOPROLOL SUCCINATE 25 MG/1
25 TABLET, EXTENDED RELEASE ORAL DAILY
Qty: 90 TABLET | Refills: 3 | Status: SHIPPED | OUTPATIENT
Start: 2025-08-29

## 2025-09-03 ENCOUNTER — OFFICE VISIT (OUTPATIENT)
Dept: FAMILY MEDICINE CLINIC | Age: 70
End: 2025-09-03
Payer: MEDICARE

## 2025-09-03 VITALS
OXYGEN SATURATION: 97 % | TEMPERATURE: 97.3 F | DIASTOLIC BLOOD PRESSURE: 80 MMHG | BODY MASS INDEX: 36.45 KG/M2 | HEART RATE: 64 BPM | RESPIRATION RATE: 16 BRPM | HEIGHT: 59 IN | WEIGHT: 180.8 LBS | SYSTOLIC BLOOD PRESSURE: 134 MMHG

## 2025-09-03 DIAGNOSIS — Z12.11 COLON CANCER SCREENING: ICD-10-CM

## 2025-09-03 DIAGNOSIS — Z00.00 MEDICARE ANNUAL WELLNESS VISIT, SUBSEQUENT: ICD-10-CM

## 2025-09-03 DIAGNOSIS — Z87.891 PERSONAL HISTORY OF TOBACCO USE: ICD-10-CM

## 2025-09-03 DIAGNOSIS — Z00.00 ANNUAL WELLNESS VISIT: Primary | ICD-10-CM

## 2025-09-03 PROCEDURE — G0439 PPPS, SUBSEQ VISIT: HCPCS | Performed by: NURSE PRACTITIONER

## 2025-09-03 PROCEDURE — 3017F COLORECTAL CA SCREEN DOC REV: CPT | Performed by: NURSE PRACTITIONER

## 2025-09-03 PROCEDURE — 3079F DIAST BP 80-89 MM HG: CPT | Performed by: NURSE PRACTITIONER

## 2025-09-03 PROCEDURE — G0296 VISIT TO DETERM LDCT ELIG: HCPCS | Performed by: NURSE PRACTITIONER

## 2025-09-03 PROCEDURE — 1123F ACP DISCUSS/DSCN MKR DOCD: CPT | Performed by: NURSE PRACTITIONER

## 2025-09-03 PROCEDURE — 3075F SYST BP GE 130 - 139MM HG: CPT | Performed by: NURSE PRACTITIONER

## 2025-09-03 ASSESSMENT — PATIENT HEALTH QUESTIONNAIRE - PHQ9
SUM OF ALL RESPONSES TO PHQ QUESTIONS 1-9: 1
4. FEELING TIRED OR HAVING LITTLE ENERGY: NOT AT ALL
SUM OF ALL RESPONSES TO PHQ QUESTIONS 1-9: 1
6. FEELING BAD ABOUT YOURSELF - OR THAT YOU ARE A FAILURE OR HAVE LET YOURSELF OR YOUR FAMILY DOWN: NOT AT ALL
5. POOR APPETITE OR OVEREATING: NOT AT ALL
7. TROUBLE CONCENTRATING ON THINGS, SUCH AS READING THE NEWSPAPER OR WATCHING TELEVISION: NOT AT ALL
9. THOUGHTS THAT YOU WOULD BE BETTER OFF DEAD, OR OF HURTING YOURSELF: NOT AT ALL
10. IF YOU CHECKED OFF ANY PROBLEMS, HOW DIFFICULT HAVE THESE PROBLEMS MADE IT FOR YOU TO DO YOUR WORK, TAKE CARE OF THINGS AT HOME, OR GET ALONG WITH OTHER PEOPLE: NOT DIFFICULT AT ALL
SUM OF ALL RESPONSES TO PHQ QUESTIONS 1-9: 1
2. FEELING DOWN, DEPRESSED OR HOPELESS: NOT AT ALL
8. MOVING OR SPEAKING SO SLOWLY THAT OTHER PEOPLE COULD HAVE NOTICED. OR THE OPPOSITE, BEING SO FIGETY OR RESTLESS THAT YOU HAVE BEEN MOVING AROUND A LOT MORE THAN USUAL: NOT AT ALL
1. LITTLE INTEREST OR PLEASURE IN DOING THINGS: NOT AT ALL
3. TROUBLE FALLING OR STAYING ASLEEP: SEVERAL DAYS
SUM OF ALL RESPONSES TO PHQ QUESTIONS 1-9: 1
